# Patient Record
Sex: FEMALE | Race: WHITE | NOT HISPANIC OR LATINO | Employment: OTHER | URBAN - METROPOLITAN AREA
[De-identification: names, ages, dates, MRNs, and addresses within clinical notes are randomized per-mention and may not be internally consistent; named-entity substitution may affect disease eponyms.]

---

## 2021-09-23 ENCOUNTER — OFFICE VISIT (OUTPATIENT)
Dept: FAMILY MEDICINE CLINIC | Facility: CLINIC | Age: 61
End: 2021-09-23

## 2021-09-23 VITALS
HEIGHT: 67 IN | WEIGHT: 196.8 LBS | OXYGEN SATURATION: 100 % | SYSTOLIC BLOOD PRESSURE: 130 MMHG | BODY MASS INDEX: 30.89 KG/M2 | HEART RATE: 66 BPM | DIASTOLIC BLOOD PRESSURE: 78 MMHG | TEMPERATURE: 98.6 F

## 2021-09-23 DIAGNOSIS — Z86.39 HISTORY OF THYROID NODULE: ICD-10-CM

## 2021-09-23 DIAGNOSIS — J32.9 CHRONIC SINUSITIS, UNSPECIFIED LOCATION: ICD-10-CM

## 2021-09-23 DIAGNOSIS — N63.20 MASS OF BREAST, LEFT: Primary | ICD-10-CM

## 2021-09-23 DIAGNOSIS — R53.83 FATIGUE, UNSPECIFIED TYPE: ICD-10-CM

## 2021-09-23 DIAGNOSIS — E06.9 THYROIDITIS: ICD-10-CM

## 2021-09-23 DIAGNOSIS — N64.52 DISCHARGE FROM LEFT NIPPLE: ICD-10-CM

## 2021-09-23 PROBLEM — Z85.528 PERSONAL HISTORY OF KIDNEY CANCER: Status: ACTIVE | Noted: 2021-09-23

## 2021-09-23 PROCEDURE — 99204 OFFICE O/P NEW MOD 45 MIN: CPT | Performed by: FAMILY MEDICINE

## 2021-09-23 NOTE — PROGRESS NOTES
"Chief Complaint  Establish Care and lump on chest    Subjective          Daysi Lugo presents to [facility]  Patient is here today to establish care.  She comes in with a new complaint of a mass on the left side of her chest for the past 5 to 6 weeks.  It is tender to palpation.  She has noted some clear to milky to yellowish nipple discharge intermittently for the past 3 years on the left side only.  Patient states that other than the tender lump in the left breast she does not have any breast pain.  She has also not noticed any redness or bruising in the area of the mass.  The patient did have a breast reduction surgery years ago.  The patient has had one mammography in her lifetime and that was about 20 years ago.     Patient is also here to discuss her thyroid.  A few years ago she was told she had an enlarged thyroid and had nodules on her thyroid.  One of her nodules was biopsied.  It was benign.  She was then started on Synthroid but had an allergic reaction to it.  She failed to follow-up with that provider and currently does not take any medications.  She has noticed some enlargement in her neck.  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  Her mother and grandmother both had thyroid cancer.  The patient herself has had a right nephrectomy due to primary kidney cancer in the past.    Patient also wanted to address chronic sinus issues.  She states for over 20 years she has had sinus problems.  She has tried multiple over-the-counter medications and prescribed medications but from prior doctors which have had temporary relief but never long-term relief of her sinus pressure and pain.  She has never seen an ENT specialist.      Objective   Vital Signs:   /78   Pulse 66   Temp 98.6 °F (37 °C)   Ht 170.2 cm (67\")   " Wt 89.3 kg (196 lb 12.8 oz)   SpO2 100%   BMI 30.82 kg/m²     Physical Exam  Vitals and nursing note reviewed.   Constitutional:       Appearance: She is well-developed.   HENT:      Head: Normocephalic and atraumatic.   Eyes:      Conjunctiva/sclera: Conjunctivae normal.   Neck:      Thyroid: Thyromegaly present. No thyroid mass or thyroid tenderness.   Cardiovascular:      Rate and Rhythm: Normal rate and regular rhythm.      Heart sounds: Normal heart sounds.   Pulmonary:      Effort: Pulmonary effort is normal.      Breath sounds: Normal breath sounds.   Chest:      Chest wall: Mass present.      Breasts:         Left: Mass present.          Comments: 2 x 2 centimeter area of firmness affixed to underlying tissues in the inner upper quadrant of the left breast.  No other masses appreciated.  No axillary lymph nodes noted.  Abdominal:      General: Bowel sounds are normal.      Palpations: Abdomen is soft.   Musculoskeletal:         General: Normal range of motion.      Cervical back: Normal range of motion and neck supple.   Lymphadenopathy:      Upper Body:      Right upper body: No supraclavicular or axillary adenopathy.      Left upper body: No supraclavicular or axillary adenopathy.   Skin:     General: Skin is warm and dry.      Capillary Refill: Capillary refill takes less than 2 seconds.      Findings: No rash.   Neurological:      Mental Status: She is alert and oriented to person, place, and time.   Psychiatric:         Mood and Affect: Mood normal.         Behavior: Behavior normal.         Thought Content: Thought content normal.         Judgment: Judgment normal.        Result Review :                 Assessment and Plan    Diagnoses and all orders for this visit:    1. Mass of breast, left (Primary)  -     Cancel: Mammo Diagnostic Digital Tomosynthesis Left With CAD; Future  -     Mammo Diagnostic Digital Tomosynthesis Bilateral With CAD; Future  -     US Breast Left Complete; Future    2.  Discharge from left nipple  -     Cancel: Mammo Diagnostic Digital Tomosynthesis Left With CAD; Future  -     Mammo Diagnostic Digital Tomosynthesis Bilateral With CAD; Future  -     US Breast Left Complete; Future    3. Thyroiditis  -     TSH    4. History of thyroid nodule  -     TSH    5. Fatigue, unspecified type  -     TSH  -     CBC & Differential  -     Comprehensive Metabolic Panel    6. Chronic sinusitis, unspecified location  -     Ambulatory Referral to ENT (Otolaryngology)    Patient here to establish care for several new issues.  Mass of left breast/nipple discharge.  Will get diagnostic mammogram and ultrasound.  Follow-up based on results.  Thyroiditis.  Labs to evaluate thyroid.  May need to get a thyroid ultrasound.  Chronic sinusitis.  Referral to ENT.  Fatigue.  Will assess for anemia, metabolic issue or thyroid problems the labs today.      Follow Up   Return in about 2 months (around 11/23/2021) for Annual physical.  Patient was given instructions and counseling regarding her condition or for health maintenance advice. Please see specific information pulled into the AVS if appropriate.

## 2021-09-24 LAB
ALBUMIN SERPL-MCNC: 4.7 G/DL (ref 3.8–4.9)
ALBUMIN/GLOB SERPL: 2.1 {RATIO} (ref 1.2–2.2)
ALP SERPL-CCNC: 131 IU/L (ref 44–121)
ALT SERPL-CCNC: 17 IU/L (ref 0–32)
AST SERPL-CCNC: 13 IU/L (ref 0–40)
BASOPHILS # BLD AUTO: 0 X10E3/UL (ref 0–0.2)
BASOPHILS NFR BLD AUTO: 0 %
BILIRUB SERPL-MCNC: 0.6 MG/DL (ref 0–1.2)
BUN SERPL-MCNC: 15 MG/DL (ref 6–24)
BUN/CREAT SERPL: 19 (ref 9–23)
CALCIUM SERPL-MCNC: 10 MG/DL (ref 8.7–10.2)
CHLORIDE SERPL-SCNC: 102 MMOL/L (ref 96–106)
CO2 SERPL-SCNC: 25 MMOL/L (ref 20–29)
CREAT SERPL-MCNC: 0.8 MG/DL (ref 0.57–1)
EOSINOPHIL # BLD AUTO: 0.3 X10E3/UL (ref 0–0.4)
EOSINOPHIL NFR BLD AUTO: 4 %
ERYTHROCYTE [DISTWIDTH] IN BLOOD BY AUTOMATED COUNT: 13.8 % (ref 11.7–15.4)
GLOBULIN SER CALC-MCNC: 2.2 G/DL (ref 1.5–4.5)
GLUCOSE SERPL-MCNC: 131 MG/DL (ref 65–99)
HCT VFR BLD AUTO: 45.7 % (ref 34–46.6)
HGB BLD-MCNC: 14.7 G/DL (ref 11.1–15.9)
IMM GRANULOCYTES # BLD AUTO: 0 X10E3/UL (ref 0–0.1)
IMM GRANULOCYTES NFR BLD AUTO: 0 %
LYMPHOCYTES # BLD AUTO: 2.1 X10E3/UL (ref 0.7–3.1)
LYMPHOCYTES NFR BLD AUTO: 29 %
MCH RBC QN AUTO: 29.2 PG (ref 26.6–33)
MCHC RBC AUTO-ENTMCNC: 32.2 G/DL (ref 31.5–35.7)
MCV RBC AUTO: 91 FL (ref 79–97)
MONOCYTES # BLD AUTO: 0.4 X10E3/UL (ref 0.1–0.9)
MONOCYTES NFR BLD AUTO: 6 %
NEUTROPHILS # BLD AUTO: 4.4 X10E3/UL (ref 1.4–7)
NEUTROPHILS NFR BLD AUTO: 61 %
PLATELET # BLD AUTO: 271 X10E3/UL (ref 150–450)
POTASSIUM SERPL-SCNC: 4.4 MMOL/L (ref 3.5–5.2)
PROT SERPL-MCNC: 6.9 G/DL (ref 6–8.5)
RBC # BLD AUTO: 5.03 X10E6/UL (ref 3.77–5.28)
SODIUM SERPL-SCNC: 142 MMOL/L (ref 134–144)
TSH SERPL DL<=0.005 MIU/L-ACNC: 2.26 UIU/ML (ref 0.45–4.5)
WBC # BLD AUTO: 7.1 X10E3/UL (ref 3.4–10.8)

## 2021-09-28 LAB
HBA1C MFR BLD: 8 % (ref 4.8–5.6)
WRITTEN AUTHORIZATION: NORMAL

## 2021-10-29 ENCOUNTER — APPOINTMENT (OUTPATIENT)
Dept: MAMMOGRAPHY | Facility: HOSPITAL | Age: 61
End: 2021-10-29

## 2021-10-29 ENCOUNTER — APPOINTMENT (OUTPATIENT)
Dept: ULTRASOUND IMAGING | Facility: HOSPITAL | Age: 61
End: 2021-10-29

## 2021-11-09 ENCOUNTER — HOSPITAL ENCOUNTER (OUTPATIENT)
Dept: ULTRASOUND IMAGING | Facility: HOSPITAL | Age: 61
Discharge: HOME | End: 2021-11-09

## 2021-11-09 ENCOUNTER — HOSPITAL ENCOUNTER (OUTPATIENT)
Dept: MAMMOGRAPHY | Facility: HOSPITAL | Age: 61
Discharge: HOME | End: 2021-11-09

## 2021-11-09 DIAGNOSIS — N64.52 DISCHARGE FROM LEFT NIPPLE: ICD-10-CM

## 2021-11-09 DIAGNOSIS — N63.20 MASS OF BREAST, LEFT: ICD-10-CM

## 2021-11-09 PROCEDURE — 76642 ULTRASOUND BREAST LIMITED: CPT

## 2021-11-09 PROCEDURE — 77066 DX MAMMO INCL CAD BI: CPT

## 2021-11-09 PROCEDURE — G0279 TOMOSYNTHESIS, MAMMO: HCPCS

## 2021-11-10 DIAGNOSIS — R92.8 ABNORMAL MAMMOGRAM OF LEFT BREAST: ICD-10-CM

## 2021-11-10 DIAGNOSIS — N63.20 MASS OF BREAST, LEFT: Primary | ICD-10-CM

## 2021-12-02 ENCOUNTER — HOSPITAL ENCOUNTER (OUTPATIENT)
Dept: ULTRASOUND IMAGING | Facility: HOSPITAL | Age: 61
Discharge: HOME | End: 2021-12-02

## 2021-12-02 ENCOUNTER — HOSPITAL ENCOUNTER (OUTPATIENT)
Dept: MAMMOGRAPHY | Facility: HOSPITAL | Age: 61
Discharge: HOME | End: 2021-12-02

## 2021-12-02 VITALS
BODY MASS INDEX: 31.08 KG/M2 | HEIGHT: 67 IN | HEART RATE: 82 BPM | DIASTOLIC BLOOD PRESSURE: 96 MMHG | SYSTOLIC BLOOD PRESSURE: 163 MMHG | TEMPERATURE: 97.5 F | OXYGEN SATURATION: 100 % | RESPIRATION RATE: 16 BRPM | WEIGHT: 198 LBS

## 2021-12-02 DIAGNOSIS — R92.8 ABNORMAL MAMMOGRAM OF LEFT BREAST: ICD-10-CM

## 2021-12-02 DIAGNOSIS — N63.20 MASS OF BREAST, LEFT: ICD-10-CM

## 2021-12-02 PROCEDURE — 88360 TUMOR IMMUNOHISTOCHEM/MANUAL: CPT | Performed by: FAMILY MEDICINE

## 2021-12-02 PROCEDURE — 88342 IMHCHEM/IMCYTCHM 1ST ANTB: CPT | Performed by: FAMILY MEDICINE

## 2021-12-02 PROCEDURE — A4648 IMPLANTABLE TISSUE MARKER: HCPCS

## 2021-12-02 PROCEDURE — 88305 TISSUE EXAM BY PATHOLOGIST: CPT | Performed by: FAMILY MEDICINE

## 2021-12-02 PROCEDURE — 77065 DX MAMMO INCL CAD UNI: CPT

## 2021-12-02 RX ORDER — LIDOCAINE HYDROCHLORIDE AND EPINEPHRINE 10; 10 MG/ML; UG/ML
10 INJECTION, SOLUTION INFILTRATION; PERINEURAL ONCE
Status: DISCONTINUED | OUTPATIENT
Start: 2021-12-02 | End: 2021-12-03 | Stop reason: HOSPADM

## 2021-12-02 RX ORDER — LIDOCAINE HYDROCHLORIDE 10 MG/ML
10 INJECTION, SOLUTION INFILTRATION; PERINEURAL ONCE
Status: DISCONTINUED | OUTPATIENT
Start: 2021-12-02 | End: 2021-12-03 | Stop reason: HOSPADM

## 2021-12-02 NOTE — H&P
Name: Daysi Lugo ADMIT: 2021   : 1960  PCP: Zarina Tineo DO    MRN: [id number] LOS: 0 days   AGE/SEX: 61 y.o. female  ROOM: Room/bed info not found       Chief complaint L breast mass     Present Illness or Internal History:  Patient is a 61 y.o. female presents with L breast mass for US bx.     Past Surgical History:  Past Surgical History:   Procedure Laterality Date   • BACK SURGERY     • HYSTERECTOMY     • KNEE SURGERY Right    • KNEE SURGERY Left    • NEPHRECTOMY Right    • REDUCTION MAMMAPLASTY     • THYROID BIOPSY Bilateral        Past Medical History:  History reviewed. No pertinent past medical history.    Home Medications:  (Not in a hospital admission)      Allergies:  Antihistamines, loratadine-type and Penicillins    Family History:  Family History   Problem Relation Age of Onset   • Thyroid cancer Mother    • Diabetes type II Mother    • Hypertension Mother    • Prostate cancer Father    • Breast cancer Maternal Grandmother    • Colon cancer Maternal Grandmother    • Thyroid cancer Maternal Grandmother    • Alzheimer's disease Maternal Grandfather        Social History:  Social History     Tobacco Use   • Smoking status: Never Smoker   • Smokeless tobacco: Never Used   Vaping Use   • Vaping Use: Never used   Substance Use Topics   • Alcohol use: Yes     Comment: rarely   • Drug use: Never        Objective     Physical Exam:    No exam performed today,    Vital Signs  Temp:  [97.5 °F (36.4 °C)] 97.5 °F (36.4 °C)  Resp:  [19] 19  BP: (177)/(96) 177/96    Anticipated Surgical Procedure:  Left breast ultrasound guided core needle biopsy    The risks, benefits and alternatives of this procedure have been discussed with the patient or responsible party: Yes        Korin Blake MD  21  14:11 EST

## 2021-12-02 NOTE — NURSING NOTE
Biopsy site to left upper breast clear with Exofin dry and intact. No firmness or swelling noted at or around biopsy site. Denies pain. Ice pack with protective covering applied to biopsy site. Discharge instructions discussed with understanding voiced by patient. Copies provided to patient. No distress noted. To home via private vehicle.

## 2021-12-02 NOTE — NURSING NOTE
VSS. Pain 1/10in left breast. Medical/surgical history and medications reviewed. No distress noted. Procedural education completed with patient's questions addressed.

## 2021-12-06 ENCOUNTER — OFFICE VISIT (OUTPATIENT)
Dept: FAMILY MEDICINE CLINIC | Facility: CLINIC | Age: 61
End: 2021-12-06

## 2021-12-06 VITALS
OXYGEN SATURATION: 95 % | HEIGHT: 67 IN | SYSTOLIC BLOOD PRESSURE: 162 MMHG | DIASTOLIC BLOOD PRESSURE: 90 MMHG | BODY MASS INDEX: 31.08 KG/M2 | WEIGHT: 198 LBS | HEART RATE: 92 BPM | TEMPERATURE: 96.9 F

## 2021-12-06 DIAGNOSIS — R03.0 ELEVATED BLOOD PRESSURE READING IN OFFICE WITHOUT DIAGNOSIS OF HYPERTENSION: ICD-10-CM

## 2021-12-06 DIAGNOSIS — Z00.00 ENCOUNTER FOR WELLNESS EXAMINATION IN ADULT: Primary | ICD-10-CM

## 2021-12-06 DIAGNOSIS — Z23 NEEDS FLU SHOT: ICD-10-CM

## 2021-12-06 DIAGNOSIS — E11.9 TYPE 2 DIABETES MELLITUS WITHOUT COMPLICATION, WITHOUT LONG-TERM CURRENT USE OF INSULIN (HCC): ICD-10-CM

## 2021-12-06 DIAGNOSIS — C50.912 INFILTRATING DUCTAL CARCINOMA OF LEFT BREAST (HCC): ICD-10-CM

## 2021-12-06 DIAGNOSIS — Z12.11 SCREENING FOR MALIGNANT NEOPLASM OF COLON: ICD-10-CM

## 2021-12-06 LAB
LAB AP CASE REPORT: NORMAL
LAB AP DIAGNOSIS COMMENT: NORMAL
LAB AP INTRADEPARTMENTAL CONSULT: NORMAL
LAB AP SPECIAL STAINS: NORMAL
LAB AP SYNOPTIC CHECKLIST: NORMAL
PATH REPORT.FINAL DX SPEC: NORMAL
PATH REPORT.GROSS SPEC: NORMAL

## 2021-12-06 PROCEDURE — 99396 PREV VISIT EST AGE 40-64: CPT | Performed by: FAMILY MEDICINE

## 2021-12-06 PROCEDURE — 90471 IMMUNIZATION ADMIN: CPT | Performed by: FAMILY MEDICINE

## 2021-12-06 PROCEDURE — 99213 OFFICE O/P EST LOW 20 MIN: CPT | Performed by: FAMILY MEDICINE

## 2021-12-06 PROCEDURE — 90686 IIV4 VACC NO PRSV 0.5 ML IM: CPT | Performed by: FAMILY MEDICINE

## 2021-12-06 RX ORDER — LANCETS 28 GAUGE
1 EACH MISCELLANEOUS DAILY
Qty: 100 EACH | Refills: 4 | Status: SHIPPED | OUTPATIENT
Start: 2021-12-06

## 2021-12-06 RX ORDER — BLOOD-GLUCOSE METER
1 KIT MISCELLANEOUS DAILY
Qty: 1 EACH | Refills: 0 | Status: SHIPPED | OUTPATIENT
Start: 2021-12-06 | End: 2021-12-29 | Stop reason: SDUPTHER

## 2021-12-06 RX ORDER — METFORMIN HYDROCHLORIDE 500 MG/1
1000 TABLET, EXTENDED RELEASE ORAL
Qty: 60 TABLET | Refills: 1 | Status: SHIPPED | OUTPATIENT
Start: 2021-12-06 | End: 2021-12-16 | Stop reason: SDUPTHER

## 2021-12-06 NOTE — PROGRESS NOTES
Subjective   Daysi Lugo is a 61 y.o. female who presents for annual female wellness exam.  Chief Complaint   Patient presents with   • Annual Exam   • Follow-up     biopsy of breast     Patient is also here to follow-up on a left breast biopsy of a mass from September.  The patient had delayed biopsy because her  passed away 6 weeks ago.  She had her biopsy done on December 2 at [facility].    Patient is also here to discuss new onset of type 2 diabetes.  She was diagnosed by a A1c of 8.0 on September 23, 2021.  However the patient did not follow-up sooner because of the death of her  6 weeks ago.  Her sister and mother are both diabetic.    Menstrual History: 1992 hysterectomy, DUB  Pregnancy History: G0  Sexual History: Monogamous male partner for the past 30 years, recently  (colon CA)  Contraception: Hysterectomy  Hormone Replacement Therapy: NA  Diet: as healthy as possible  Exercise: no routine  Do you feel safe? Yes  Have you ever been abused? No    Mammogram: 10/2021, just had breast biopsy that revealed invasive ductal carcinoma of the left breast.  Pap Smear: Hysterectomy for DUB  Bone Density: NA  Colon Cancer Screening: about 10 years, normal.    Immunization History   Administered Date(s) Administered   • COVID-19 (PFIZER) 04/17/2021, 05/08/2021   • Flu Vaccine Split Quad 10/09/2019, 10/07/2020   • FluLaval/Fluarix/Fluzone >6 12/06/2021       The following portions of the patient's history were reviewed and updated as appropriate: allergies, current medications, past family history, past medical history, past social history, past surgical history and problem list.    History reviewed. No pertinent past medical history.    Past Surgical History:   Procedure Laterality Date   • BACK SURGERY  1990   • HYSTERECTOMY     • KNEE SURGERY Right 2001   • KNEE SURGERY Left 2003   • NEPHRECTOMY Right 1991   • REDUCTION MAMMAPLASTY     • THYROID BIOPSY Bilateral 2010       Family  History   Problem Relation Age of Onset   • Thyroid cancer Mother    • Diabetes type II Mother    • Hypertension Mother    • Prostate cancer Father    • Breast cancer Maternal Grandmother    • Colon cancer Maternal Grandmother    • Thyroid cancer Maternal Grandmother    • Alzheimer's disease Maternal Grandfather        Social History     Socioeconomic History   • Marital status:      Spouse name: Lucas   • Number of children: 0   • Highest education level: Bachelor's degree (e.g., BA, AB, BS)   Tobacco Use   • Smoking status: Never Smoker   • Smokeless tobacco: Never Used   Vaping Use   • Vaping Use: Never used   Substance and Sexual Activity   • Alcohol use: Not Currently     Alcohol/week: 0.0 standard drinks     Comment: rarely   • Drug use: Never       Review of Systems   Constitutional: Negative for activity change, appetite change, fatigue and unexpected weight change.   HENT: Negative for congestion, ear pain, nosebleeds, sore throat and tinnitus.    Eyes: Negative for pain, redness and visual disturbance.   Respiratory: Negative for cough, shortness of breath and wheezing.    Cardiovascular: Negative for chest pain, palpitations and leg swelling.   Gastrointestinal: Negative for abdominal pain, blood in stool and nausea.   Endocrine: Negative for polydipsia and polyuria.   Genitourinary: Negative for dysuria, frequency, menstrual problem and vaginal discharge.   Musculoskeletal: Negative for arthralgias, joint swelling and myalgias.   Skin: Negative for rash.   Allergic/Immunologic: Negative for environmental allergies, food allergies and immunocompromised state.   Neurological: Negative for dizziness, speech difficulty, weakness and headaches.   Hematological: Negative for adenopathy. Does not bruise/bleed easily.   Psychiatric/Behavioral: Negative for decreased concentration and dysphoric mood. The patient is not nervous/anxious.        Objective   Vitals:    12/06/21 1319   BP: 162/90   Pulse:     Temp:    SpO2:      Body mass index is 31.01 kg/m².  Physical Exam  Vitals and nursing note reviewed.   Constitutional:       Appearance: She is well-developed.   HENT:      Head: Normocephalic and atraumatic.      Right Ear: Tympanic membrane, ear canal and external ear normal.      Left Ear: Tympanic membrane, ear canal and external ear normal.   Eyes:      General: No scleral icterus.  Cardiovascular:      Rate and Rhythm: Normal rate and regular rhythm.      Heart sounds: Normal heart sounds.   Pulmonary:      Effort: Pulmonary effort is normal.      Breath sounds: Normal breath sounds.   Musculoskeletal:         General: Normal range of motion.      Cervical back: Normal range of motion and neck supple.      Right lower leg: No edema.      Left lower leg: No edema.   Skin:     General: Skin is warm.      Findings: No rash.   Neurological:      Mental Status: She is alert and oriented to person, place, and time.   Psychiatric:         Mood and Affect: Mood normal.         Behavior: Behavior normal.         Thought Content: Thought content normal.         Judgment: Judgment normal.           Assessment/Plan   Diagnoses and all orders for this visit:    1. Encounter for wellness examination in adult (Primary)    2. Infiltrating ductal carcinoma of left breast (HCC)  -     Ambulatory Referral to Breast Surgery  -     Ambulatory Referral to Hematology / Oncology    3. Type 2 diabetes mellitus without complication, without long-term current use of insulin (HCC)  -     glucose blood test strip; 1 each by Other route Daily.  Dispense: 100 each; Refill: 4  -     glucose monitor monitoring kit; 1 each Daily.  Dispense: 1 each; Refill: 0  -     Lancets (freestyle) lancets; 1 each by Other route Daily.  Dispense: 100 each; Refill: 4  -     metFORMIN ER (GLUCOPHAGE-XR) 500 MG 24 hr tablet; Take 2 tablets by mouth Daily With Breakfast.  Dispense: 60 tablet; Refill: 1  -     Ambulatory Referral to Diabetic Education  -      Comprehensive Metabolic Panel    4. Elevated blood pressure reading in office without diagnosis of hypertension  -     Comprehensive Metabolic Panel    5. Screening for malignant neoplasm of colon  -     Ambulatory Referral to General Surgery    6. Needs flu shot  -     FluLaval/Fluarix/Fluzone >6 Months ([id number])    Patient is here today for routine physical.  She is also here for 3 new problems:    1) new diagnosis of infiltrating ductal carcinoma of the left breast.  Referrals to a breast surgeon and oncologist were made.  2) new diagnosis of new onset type 2 diabetes. I will start her on Metformin 500 mg once daily and then after 1 week she may increase to twice daily.  I had a long discussion with the patient regarding the importance of diet and exercise in the management of type 2 diabetes.  Patient was also given prescription for glucometer, test strips and lancets.  She will meet with the diabetic educator per referral.  Follow-up in 2 weeks.  3) elevated blood pressure readings. The patient had a right nephrectomy for cancer of the kidney in the past. If labs are okay today I will consider starting lisinopril.  Follow-up in 2 weeks.    Discussed the importance of maintaining a healthy weight and getting regular exercise.  Educated patient on the benefits of healthy diet.  Advise follow-up annually for wellness exams.      There are no Patient Instructions on file for this visit.

## 2021-12-07 LAB
ALBUMIN SERPL-MCNC: 4.2 G/DL (ref 3.8–4.9)
ALBUMIN/GLOB SERPL: 1.9 {RATIO} (ref 1.2–2.2)
ALP SERPL-CCNC: 125 IU/L (ref 44–121)
ALT SERPL-CCNC: 17 IU/L (ref 0–32)
AST SERPL-CCNC: 11 IU/L (ref 0–40)
BILIRUB SERPL-MCNC: 0.3 MG/DL (ref 0–1.2)
BUN SERPL-MCNC: 17 MG/DL (ref 6–24)
BUN/CREAT SERPL: 21 (ref 9–23)
CALCIUM SERPL-MCNC: 9.6 MG/DL (ref 8.7–10.2)
CHLORIDE SERPL-SCNC: 101 MMOL/L (ref 96–106)
CO2 SERPL-SCNC: 25 MMOL/L (ref 20–29)
CREAT SERPL-MCNC: 0.82 MG/DL (ref 0.57–1)
GLOBULIN SER CALC-MCNC: 2.2 G/DL (ref 1.5–4.5)
GLUCOSE SERPL-MCNC: 196 MG/DL (ref 65–99)
POTASSIUM SERPL-SCNC: 4.3 MMOL/L (ref 3.5–5.2)
PROT SERPL-MCNC: 6.4 G/DL (ref 6–8.5)
SODIUM SERPL-SCNC: 142 MMOL/L (ref 134–144)

## 2021-12-10 ENCOUNTER — TELEPHONE (OUTPATIENT)
Dept: SURGERY | Facility: CLINIC | Age: 61
End: 2021-12-10

## 2021-12-10 NOTE — TELEPHONE ENCOUNTER
New patient appointment with Dr. Delong is scheduled on 12/29/2021 @ 11:00am.    Called and spoke to patient, patient expressed v/u of appointment time.    Sent patient a reminder letter in the mail.

## 2021-12-15 ENCOUNTER — TELEPHONE (OUTPATIENT)
Dept: SURGERY | Facility: CLINIC | Age: 61
End: 2021-12-15

## 2021-12-15 NOTE — TELEPHONE ENCOUNTER
Breast MRI is scheduled @ [facility] location on 12/23/2021 @ 12:00pm, patient arrival 11:30am.    Called and spoke to patient, patient expressed v/u of appointment times.    Sent patient a reminder letter in the mail.

## 2021-12-16 ENCOUNTER — OFFICE VISIT (OUTPATIENT)
Dept: FAMILY MEDICINE CLINIC | Facility: CLINIC | Age: 61
End: 2021-12-16

## 2021-12-16 VITALS
BODY MASS INDEX: 31.56 KG/M2 | DIASTOLIC BLOOD PRESSURE: 86 MMHG | SYSTOLIC BLOOD PRESSURE: 132 MMHG | HEART RATE: 82 BPM | HEIGHT: 67 IN | WEIGHT: 201.1 LBS | OXYGEN SATURATION: 96 % | TEMPERATURE: 98.2 F

## 2021-12-16 DIAGNOSIS — I10 PRIMARY HYPERTENSION: ICD-10-CM

## 2021-12-16 DIAGNOSIS — E11.9 TYPE 2 DIABETES MELLITUS WITHOUT COMPLICATION, WITHOUT LONG-TERM CURRENT USE OF INSULIN (HCC): Primary | ICD-10-CM

## 2021-12-16 LAB
BILIRUB BLD-MCNC: NEGATIVE MG/DL
CLARITY, POC: CLEAR
COLOR UR: YELLOW
EXPIRATION DATE: ABNORMAL
EXPIRATION DATE: NORMAL
GLUCOSE UR STRIP-MCNC: NEGATIVE MG/DL
HBA1C MFR BLD: 8.5 %
KETONES UR QL: NEGATIVE
LEUKOCYTE EST, POC: NEGATIVE
Lab: ABNORMAL
Lab: NORMAL
NITRITE UR-MCNC: NEGATIVE MG/ML
PH UR: 6 [PH] (ref 5–8)
POC CREATININE URINE: 10
POC MICROALBUMIN URINE: 10
PROT UR STRIP-MCNC: NEGATIVE MG/DL
RBC # UR STRIP: NEGATIVE /UL
SP GR UR: 1.01 (ref 1–1.03)
UROBILINOGEN UR QL: NORMAL

## 2021-12-16 PROCEDURE — 99214 OFFICE O/P EST MOD 30 MIN: CPT | Performed by: FAMILY MEDICINE

## 2021-12-16 PROCEDURE — 81003 URINALYSIS AUTO W/O SCOPE: CPT | Performed by: FAMILY MEDICINE

## 2021-12-16 PROCEDURE — 82044 UR ALBUMIN SEMIQUANTITATIVE: CPT | Performed by: FAMILY MEDICINE

## 2021-12-16 PROCEDURE — 83036 HEMOGLOBIN GLYCOSYLATED A1C: CPT | Performed by: FAMILY MEDICINE

## 2021-12-16 PROCEDURE — 93000 ELECTROCARDIOGRAM COMPLETE: CPT | Performed by: FAMILY MEDICINE

## 2021-12-16 RX ORDER — METFORMIN HYDROCHLORIDE 500 MG/1
1000 TABLET, EXTENDED RELEASE ORAL 2 TIMES DAILY WITH MEALS
Qty: 360 TABLET | Refills: 1 | Status: SHIPPED | OUTPATIENT
Start: 2021-12-16 | End: 2022-06-27 | Stop reason: SDUPTHER

## 2021-12-16 RX ORDER — LISINOPRIL 5 MG/1
5 TABLET ORAL DAILY
Qty: 30 TABLET | Refills: 1 | Status: SHIPPED | OUTPATIENT
Start: 2021-12-16 | End: 2022-01-12 | Stop reason: SDUPTHER

## 2021-12-16 NOTE — PROGRESS NOTES
"Chief Complaint  Diabetes    Subjective          Daysi Lugo presents to [facility]  Patient is here to follow up on new onset of type 2 diabetes.  She was diagnosed by a A1c of 8.0 on September 23, 2021.  She was started on metformin 500mg daily 2 weeks ago.  Currently she is taking Metformin 500 mg twice daily.  She is tolerating the medication well and denies any side effects.    Hypertension.  This is a new problem for the patient that has not been previously addressed.  Patient denies headaches, chest pain or shortness of breath.  Patient has had a right nephrectomy for kidney cancer in 1991.    Diabetes  She has type 2 diabetes mellitus. No MedicAlert identification noted. The initial diagnosis of diabetes was made 7 days ago. Symptoms are stable. Her weight is fluctuating minimally. She is following a generally healthy diet. When asked about meal planning, she reported none. She participates in exercise intermittently. She monitors blood glucose at home 1-2 x per day. There is no change in her home blood glucose trend. Her highest blood glucose is >200 mg/dl. Her overall blood glucose range is >200 mg/dl. Eye exam is current.       Objective   Vital Signs:   /86 (BP Location: Left arm, Patient Position: Sitting)   Pulse 82   Temp 98.2 °F (36.8 °C)   Ht 170.2 cm (67\")   Wt 91.2 kg (201 lb 1.6 oz)   SpO2 96%   BMI 31.50 kg/m²     Physical Exam  Vitals and nursing note reviewed.   Constitutional:       Appearance: She is well-developed.   HENT:      Head: Normocephalic and atraumatic.      Right Ear: External ear normal.      Left Ear: External ear normal.      Nose: Nose normal.   Eyes:      General: No scleral icterus.     Conjunctiva/sclera: Conjunctivae normal.   Cardiovascular:      Rate and Rhythm: Normal rate and regular rhythm.      Heart sounds: Normal heart sounds.   Pulmonary:      Effort: Pulmonary effort is normal.      Breath sounds: Normal breath " sounds.   Musculoskeletal:      Cervical back: Normal range of motion and neck supple.      Right lower leg: No edema.      Left lower leg: No edema.   Lymphadenopathy:      Cervical: No cervical adenopathy.   Skin:     General: Skin is warm and dry.      Findings: No rash.   Neurological:      Mental Status: She is alert and oriented to person, place, and time.   Psychiatric:         Mood and Affect: Mood normal.         Behavior: Behavior normal.         Thought Content: Thought content normal.         Judgment: Judgment normal.        Result Review :   The following data was reviewed by: Zarina Tineo DO on 12/16/2021:  Common labs    Common Labsle 9/23/21 9/23/21 9/23/21 12/6/21 12/16/21    1401 1401 1401     Glucose  131 (A)  196 (A)    BUN  15  17    Creatinine  0.80  0.82    eGFR Non  Am  81  79    eGFR African Am  93  91    Sodium  142  142    Potassium  4.4  4.3    Chloride  102  101    Calcium  10.0  9.6    Total Protein  6.9  6.4    Albumin  4.7  4.2    Total Bilirubin  0.6  0.3    Alkaline Phosphatase  131 (A)  125 (A)    AST (SGOT)  13  11    ALT (SGPT)  17  17    WBC 7.1       Hemoglobin 14.7       Hematocrit 45.7       Platelets 271       Hemoglobin A1C   8.0 (A)  8.5   (A) Abnormal value       Comments are available for some flowsheets but are not being displayed.           TSH    TSH 9/23/21   TSH 2.260           UA in house negative  Urine microalbumin negative         ECG 12 Lead    Date/Time: 12/16/2021 10:41 AM  Performed by: Zarina Tineo DO  Authorized by: aZrina Tineo DO   Previous ECG: no previous ECG available  Rhythm: sinus rhythm  Rate: normal  Conduction: conduction normal  ST Segments: ST segments normal  T Waves: T waves normal  QRS axis: normal    Clinical impression: normal ECG              Assessment and Plan    Diagnoses and all orders for this visit:    1. Type 2 diabetes mellitus without complication, without long-term current use of insulin (HCC) (Primary)  -      POC Glycosylated Hemoglobin (Hb A1C)  -     POC Microalbumin  -     metFORMIN ER (GLUCOPHAGE-XR) 500 MG 24 hr tablet; Take 2 tablets by mouth 2 (Two) Times a Day With Meals.  Dispense: 360 tablet; Refill: 1  -     Comprehensive Metabolic Panel    2. Primary hypertension  -     ECG 12 Lead  -     POC Urinalysis Dipstick, Automated  -     lisinopril (PRINIVIL,ZESTRIL) 5 MG tablet; Take 1 tablet by mouth Daily.  Dispense: 30 tablet; Refill: 1  -     Comprehensive Metabolic Panel    Patient is here today to follow-up on uncontrolled diabetes and for new problem of hypertension.  Increase the Metformin to the maximum dosage of 1000 mg twice daily.  Start lisinopril 5 mg daily for blood pressure.  I will monitor her creatinine closely as the patient only has 1 kidney.  Surveillance labs were obtained today and any medication changes will be made based on lab results and will be called to the patient later this week.      Follow Up   Return in about 18 days (around 1/3/2022) for Diabetes, HTN.  Patient was given instructions and counseling regarding her condition or for health maintenance advice. Please see specific information pulled into the AVS if appropriate.       Answers for HPI/ROS submitted by the patient on 12/13/2021  What is the primary reason for your visit?: Diabetes

## 2021-12-17 ENCOUNTER — PATIENT MESSAGE (OUTPATIENT)
Dept: FAMILY MEDICINE CLINIC | Facility: CLINIC | Age: 61
End: 2021-12-17

## 2021-12-17 DIAGNOSIS — E11.9 TYPE 2 DIABETES MELLITUS WITHOUT COMPLICATION, WITHOUT LONG-TERM CURRENT USE OF INSULIN (HCC): ICD-10-CM

## 2021-12-17 LAB
ALBUMIN SERPL-MCNC: 4.4 G/DL (ref 3.8–4.9)
ALBUMIN/GLOB SERPL: 2.1 {RATIO} (ref 1.2–2.2)
ALP SERPL-CCNC: 120 IU/L (ref 44–121)
ALT SERPL-CCNC: 16 IU/L (ref 0–32)
AST SERPL-CCNC: 12 IU/L (ref 0–40)
BILIRUB SERPL-MCNC: 0.5 MG/DL (ref 0–1.2)
BUN SERPL-MCNC: 20 MG/DL (ref 6–24)
BUN/CREAT SERPL: 24 (ref 9–23)
CALCIUM SERPL-MCNC: 9.8 MG/DL (ref 8.7–10.2)
CHLORIDE SERPL-SCNC: 100 MMOL/L (ref 96–106)
CO2 SERPL-SCNC: 24 MMOL/L (ref 20–29)
CREAT SERPL-MCNC: 0.84 MG/DL (ref 0.57–1)
GLOBULIN SER CALC-MCNC: 2.1 G/DL (ref 1.5–4.5)
GLUCOSE SERPL-MCNC: 130 MG/DL (ref 65–99)
POTASSIUM SERPL-SCNC: 4.7 MMOL/L (ref 3.5–5.2)
PROT SERPL-MCNC: 6.5 G/DL (ref 6–8.5)
SODIUM SERPL-SCNC: 140 MMOL/L (ref 134–144)

## 2021-12-17 NOTE — TELEPHONE ENCOUNTER
From: Daysi Lugo  To: Zarina Tineo DO  Sent: 12/17/2021 12:41 PM EST  Subject: OneTouch Verio Test Strips Rx quantity    Dr. Tineo,     RE: [id number]    I spoke to the pharmacy. The quantity of this prescription was 100 per fill; however, the insurance will only allow 25 per fill because the dosage listed was to test once daily.    CD    Daysi Lugo  [phone]

## 2021-12-23 ENCOUNTER — HOSPITAL ENCOUNTER (OUTPATIENT)
Dept: MRI IMAGING | Facility: HOSPITAL | Age: 61
Discharge: HOME | End: 2021-12-23
Admitting: SURGERY

## 2021-12-23 ENCOUNTER — TELEPHONE (OUTPATIENT)
Dept: FAMILY MEDICINE CLINIC | Facility: CLINIC | Age: 61
End: 2021-12-23

## 2021-12-23 DIAGNOSIS — C50.212 MALIGNANT NEOPLASM OF UPPER-INNER QUADRANT OF LEFT BREAST IN FEMALE, ESTROGEN RECEPTOR POSITIVE (HCC): ICD-10-CM

## 2021-12-23 DIAGNOSIS — Z17.0 MALIGNANT NEOPLASM OF UPPER-INNER QUADRANT OF LEFT BREAST IN FEMALE, ESTROGEN RECEPTOR POSITIVE (HCC): ICD-10-CM

## 2021-12-23 PROCEDURE — A9577 INJ MULTIHANCE: HCPCS | Performed by: SURGERY

## 2021-12-23 PROCEDURE — 82565 ASSAY OF CREATININE: CPT

## 2021-12-23 PROCEDURE — 0 GADOBENATE DIMEGLUMINE 529 MG/ML SOLUTION: Performed by: SURGERY

## 2021-12-23 PROCEDURE — 77049 MRI BREAST C-+ W/CAD BI: CPT

## 2021-12-23 RX ADMIN — GADOBENATE DIMEGLUMINE 20 ML: 529 INJECTION, SOLUTION INTRAVENOUS at 19:54

## 2021-12-26 LAB — CREAT BLDA-MCNC: 1.2 MG/DL (ref 0.6–1.3)

## 2021-12-29 ENCOUNTER — OFFICE VISIT (OUTPATIENT)
Dept: SURGERY | Facility: CLINIC | Age: 61
End: 2021-12-29

## 2021-12-29 ENCOUNTER — CONSULT (OUTPATIENT)
Dept: ONCOLOGY | Facility: CLINIC | Age: 61
End: 2021-12-29

## 2021-12-29 ENCOUNTER — PREP FOR SURGERY (OUTPATIENT)
Dept: OTHER | Facility: HOSPITAL | Age: 61
End: 2021-12-29

## 2021-12-29 ENCOUNTER — LAB (OUTPATIENT)
Dept: OTHER | Facility: HOSPITAL | Age: 61
End: 2021-12-29

## 2021-12-29 VITALS
WEIGHT: 207.6 LBS | TEMPERATURE: 97.3 F | HEART RATE: 68 BPM | DIASTOLIC BLOOD PRESSURE: 86 MMHG | OXYGEN SATURATION: 95 % | HEIGHT: 66 IN | SYSTOLIC BLOOD PRESSURE: 165 MMHG | RESPIRATION RATE: 16 BRPM | BODY MASS INDEX: 33.37 KG/M2

## 2021-12-29 VITALS
DIASTOLIC BLOOD PRESSURE: 98 MMHG | WEIGHT: 205 LBS | HEART RATE: 69 BPM | OXYGEN SATURATION: 97 % | HEIGHT: 67 IN | SYSTOLIC BLOOD PRESSURE: 162 MMHG | BODY MASS INDEX: 32.18 KG/M2

## 2021-12-29 DIAGNOSIS — Z17.0 MALIGNANT NEOPLASM OF UPPER-INNER QUADRANT OF LEFT BREAST IN FEMALE, ESTROGEN RECEPTOR POSITIVE (HCC): Primary | ICD-10-CM

## 2021-12-29 DIAGNOSIS — Z85.528 PERSONAL HISTORY OF KIDNEY CANCER: ICD-10-CM

## 2021-12-29 DIAGNOSIS — C50.212 MALIGNANT NEOPLASM OF UPPER-INNER QUADRANT OF LEFT BREAST IN FEMALE, ESTROGEN RECEPTOR POSITIVE (HCC): Primary | ICD-10-CM

## 2021-12-29 DIAGNOSIS — Z17.0 MALIGNANT NEOPLASM OF UPPER-INNER QUADRANT OF LEFT BREAST IN FEMALE, ESTROGEN RECEPTOR POSITIVE: ICD-10-CM

## 2021-12-29 DIAGNOSIS — C50.212 MALIGNANT NEOPLASM OF UPPER-INNER QUADRANT OF LEFT BREAST IN FEMALE, ESTROGEN RECEPTOR POSITIVE: ICD-10-CM

## 2021-12-29 DIAGNOSIS — Z17.0 MALIGNANT NEOPLASM OF UPPER-INNER QUADRANT OF LEFT BREAST IN FEMALE, ESTROGEN RECEPTOR POSITIVE: Primary | ICD-10-CM

## 2021-12-29 DIAGNOSIS — C50.212 MALIGNANT NEOPLASM OF UPPER-INNER QUADRANT OF LEFT BREAST IN FEMALE, ESTROGEN RECEPTOR POSITIVE: Primary | ICD-10-CM

## 2021-12-29 DIAGNOSIS — Z80.3 FAMILY HISTORY OF BREAST CANCER: ICD-10-CM

## 2021-12-29 DIAGNOSIS — C50.912 INFILTRATING DUCTAL CARCINOMA OF LEFT BREAST: Primary | ICD-10-CM

## 2021-12-29 LAB
BASOPHILS # BLD AUTO: 0.02 10*3/MM3 (ref 0–0.2)
BASOPHILS NFR BLD AUTO: 0.3 % (ref 0–1.5)
DEPRECATED RDW RBC AUTO: 43.1 FL (ref 37–54)
EOSINOPHIL # BLD AUTO: 0.28 10*3/MM3 (ref 0–0.4)
EOSINOPHIL NFR BLD AUTO: 4.1 % (ref 0.3–6.2)
ERYTHROCYTE [DISTWIDTH] IN BLOOD BY AUTOMATED COUNT: 13.4 % (ref 12.3–15.4)
HCT VFR BLD AUTO: 41.3 % (ref 34–46.6)
HGB BLD-MCNC: 14.2 G/DL (ref 12–15.9)
IMM GRANULOCYTES # BLD AUTO: 0.01 10*3/MM3 (ref 0–0.05)
IMM GRANULOCYTES NFR BLD AUTO: 0.1 % (ref 0–0.5)
LYMPHOCYTES # BLD AUTO: 1.8 10*3/MM3 (ref 0.7–3.1)
LYMPHOCYTES NFR BLD AUTO: 26.2 % (ref 19.6–45.3)
MCH RBC QN AUTO: 30.2 PG (ref 26.6–33)
MCHC RBC AUTO-ENTMCNC: 34.4 G/DL (ref 31.5–35.7)
MCV RBC AUTO: 87.9 FL (ref 79–97)
MONOCYTES # BLD AUTO: 0.36 10*3/MM3 (ref 0.1–0.9)
MONOCYTES NFR BLD AUTO: 5.2 % (ref 5–12)
NEUTROPHILS NFR BLD AUTO: 4.39 10*3/MM3 (ref 1.7–7)
NEUTROPHILS NFR BLD AUTO: 64.1 % (ref 42.7–76)
NRBC BLD AUTO-RTO: 0 /100 WBC (ref 0–0.2)
PLATELET # BLD AUTO: 206 10*3/MM3 (ref 140–450)
PMV BLD AUTO: 11.1 FL (ref 6–12)
RBC # BLD AUTO: 4.7 10*6/MM3 (ref 3.77–5.28)
WBC NRBC COR # BLD: 6.86 10*3/MM3 (ref 3.4–10.8)

## 2021-12-29 PROCEDURE — 99417 PROLNG OP E/M EACH 15 MIN: CPT | Performed by: SURGERY

## 2021-12-29 PROCEDURE — 36415 COLL VENOUS BLD VENIPUNCTURE: CPT

## 2021-12-29 PROCEDURE — 99205 OFFICE O/P NEW HI 60 MIN: CPT | Performed by: INTERNAL MEDICINE

## 2021-12-29 PROCEDURE — 85025 COMPLETE CBC W/AUTO DIFF WBC: CPT | Performed by: INTERNAL MEDICINE

## 2021-12-29 PROCEDURE — 99205 OFFICE O/P NEW HI 60 MIN: CPT | Performed by: SURGERY

## 2021-12-29 RX ORDER — DIAZEPAM 5 MG/1
10 TABLET ORAL ONCE
Status: CANCELLED | OUTPATIENT
Start: 2022-01-17 | End: 2021-12-29

## 2021-12-29 RX ORDER — BLOOD-GLUCOSE METER
KIT MISCELLANEOUS SEE ADMIN INSTRUCTIONS
COMMUNITY
Start: 2021-12-06

## 2021-12-29 RX ORDER — HEPARIN SODIUM 5000 [USP'U]/ML
5000 INJECTION, SOLUTION INTRAVENOUS; SUBCUTANEOUS
Status: CANCELLED | OUTPATIENT
Start: 2022-01-17 | End: 2022-01-18

## 2021-12-29 NOTE — PROGRESS NOTES
Subjective     REASON FOR CONSULTATION: Newly diagnosed breast:  Provide an opinion on any further workup or treatment                             REQUESTING PHYSICIAN: Dr. Zarina Tineo    RECORDS OBTAINED:  Records of the patients history including those obtained from the referring provider were reviewed and summarized in detail.    HISTORY OF PRESENT ILLNESS:  The patient is a 61 y.o. year old female who is here for an opinion about the above issue.    History of Present Illness patient is a 61-year-old female who is a  for [employer]. She recently back in September October 2021 noticed a mass in the left breast upper inner quadrant. She was playing with a cat and noticed some pain in the breast at which time she felt the breast and felt this mass. She has had history of nipple discharge on and off for years in the left breast but that has been very minimal. There is no bloody discharge. She has not had mammogram for years, at least 25 years according to her. So when she felt the mass she went to her primary care physician Dr. Zarina Tineo who ordered a diagnostic mammogram and found in abnormality in the left breast at 10 o'clock position which was 2.6 x 2.5 x 2.3 cm. Subsequently she underwent a biopsy which was consistent with invasive ductal carcinoma ER/OK positive HER-2/juan negative.    She does have a family history of malignancy with her grandmother having: And breast cancer on her maternal side. Her maternal great-grandmother had breast cancer. Her mother had thyroid cancer. Patient has had renal cell cancer at age 31 and had to undergo left nephrectomy. She also had a thyroid nodule for which she was seen at [place] by physician. He had placed her on Synthroid and she had a very severe allergic reaction to Synthroid with swelling and he discontinued that. Patient was to be followed in a years time and has not gone.. But she has not had any issues with that    More  recently she lost her  3 weeks ago as he was dealing with colon cancer. He was 70 years of age. He did not want any treatment. She had to take care of of him for the last 16 months and if no her symptoms. 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    Details are as follows    November 10, 2021: Bilateral diagnostic mammogram and ultrasound  FINDINGS: Bilateral digital CC and MLO mammographic and Tomosynthesis  images were obtained. No prior examination is available for comparison.  Scattered fibroglandular densities are seen throughout both breasts. In  the posterior one-third upper inner quadrant of the left breast at the  site of palpable concern corresponding to the overlying triangular skin  marker, there is an irregular mass that measures on the order of 2.6 x  2.5 x 2.3 cm. Internal coarse calcifications and areas of fat necrosis  are noted. I see no other dominant masses in either breast. No areas of  architectural distortion are appreciated. There is no evidence for skin  thickening, nipple retraction or axillary adenopathy.     ULTRASOUND: Targeted sonographic evaluation of the left breast was  performed through the area of palpable concern which corresponds to the  10-o'clock position on the order of 5 cm from the nipple. At this  location, there is an irregular hypoechoic mass with internal  calcifications. The mass measures 2.4 x 1.9 x 1.4 cm. No detectable  internal vascularity is appreciated. Posterior acoustic shadowing is  noted.     IMPRESSION:  1. There is an irregular mass in the left breast at the 10-o'clock  position corresponding to the site of palpable concern. Sonographically,  the mass measures on the order of 2.4 cm in greatest dimension.  Correlation with an ultrasound-guided left breast biopsy is recommended.  2. There are no findings suspicious for malignancy in the right breast.     November 9, 2021:  Ultrasound-guided left breast biopsy showed    Final Diagnosis   1. Left Breast, 10 O'clock, 5 cm from Nipple, Ultrasound-Guided Biopsies for a Mass: INVASIVE MAMMARY                CARCINOMA, NO SPECIAL TYPE (INVASIVE DUCTAL CARCINOMA).               A. Histologic grade: Eldon histologic score II (tubules = 3, nuclei = 2, mitosis = 1).               B. Largest contiguous invasive focus measures 10 mm in a core.               C. No definitive in situ component identified.                       D. No lymphovascular nor perineural invasion identified.     Estrogen receptor: %  Progesterone receptor: %  HER-2/juan: 1+, negative  Ki-67: 8%    December 28, 2021: MRI of the breast    FINDINGS:Scattered fibroglandular tissue is seen throughout both  breasts. Minimal background parenchymal enhancement of both breasts is  noted.     Within the posterior one-third upper inner quadrant of the left breast  centered at the 10 o'clock position on the order of 8 cm from nipple  there is an irregular enhancing mass that measures on the order of 2.5  cm in anterior to posterior dimension, 2.8 cm in the mediolateral  dimension and 2.1 cm in the superior-inferior dimension. A signal void  is seen within the mass that corresponds to a bowtie-shaped metallic  clip. This represents the biopsy-proven site of malignancy.     No other areas of abnormal enhancement or morphology are seen within the  left breast. I see no evidence for abnormal left breast skin, nipple or  chest wall enhancement.  There is no evidence for left axillary or  internal mammary chain adenopathy.     There are no areas of abnormal enhancement or morphology in the right  breast. I see no evidence for abnormal right breast skin, nipple or  chest wall enhancement and there is no evidence for right axillary or  internal mammary chain adenopathy.     IMPRESSION:  1. Biopsy-proven malignancy in the left breast at the 10 o'clock  position in the posterior  one-third that measures on the order of 2.8 cm  in greatest dimension with a centrally located bowtie shaped metallic  clip. No other suspicious findings are seen within the left breast and  there is no evidence for left axillary or internal mammary chain  adenopathy.  2. There are no findings suspicious for malignancy in the right breast.     BI-RADS category 6: Known malignancy.     This report was finalized on 12/28/2021 7:24 AM by Dr. Sixto Ramos M.D.     Patient has been scheduled for further recommendations. She has appointment with Dr. Beth Delong at 11 AM today.    Past Medical History:   Diagnosis Date   • Breast cancer (HCC) 2021    Invasive ductal carcinoma of left breast    • History of kidney cancer     S/P right nephrectomy in 1991   • Hypertension    • Type 2 diabetes mellitus (HCC)         Past Surgical History:   Procedure Laterality Date   • BACK SURGERY  1990   • BREAST BIOPSY Left 09/2021    mass    • HYSTERECTOMY  1992   • KNEE SURGERY Right 2001   • KNEE SURGERY Left 2003   • NEPHRECTOMY Right 1991   • REDUCTION MAMMAPLASTY     • THYROID BIOPSY Bilateral 2010        Current Outpatient Medications on File Prior to Visit   Medication Sig Dispense Refill   • Blood Glucose Monitoring Suppl (OneTouch Verio Reflect) w/Device kit See Admin Instructions.     • glucose blood test strip 1 each by Other route Daily. 25 each 2   • Lancets (freestyle) lancets 1 each by Other route Daily. 100 each 4   • lisinopril (PRINIVIL,ZESTRIL) 5 MG tablet Take 1 tablet by mouth Daily. 30 tablet 1   • metFORMIN ER (GLUCOPHAGE-XR) 500 MG 24 hr tablet Take 2 tablets by mouth 2 (Two) Times a Day With Meals. 360 tablet 1   • glucose monitor monitoring kit 1 each Daily. 1 each 0     No current facility-administered medications on file prior to visit.        ALLERGIES:    Allergies   Allergen Reactions   • Hydrocodone Anaphylaxis     PER PATIENT   • Penicillins Itching        Social History     Socioeconomic History    • Marital status:      Spouse name: Lucas   • Number of children: 0   • Highest education level: Bachelor's degree (e.g., BA, AB, BS)   Tobacco Use   • Smoking status: Never Smoker   • Smokeless tobacco: Never Used   Vaping Use   • Vaping Use: Never used   Substance and Sexual Activity   • Alcohol use: Not Currently     Alcohol/week: 0.0 standard drinks     Comment: rarely   • Drug use: Never        Family History   Problem Relation Age of Onset   • Thyroid cancer Mother    • Diabetes type II Mother    • Hypertension Mother    • Prostate cancer Father    • Breast cancer Maternal Grandmother    • Colon cancer Maternal Grandmother    • Thyroid cancer Maternal Grandmother    • Alzheimer's disease Maternal Grandfather         Review of Systems   Constitutional: Negative for appetite change, chills, diaphoresis, fatigue, fever and unexpected weight change.   HENT: Negative for hearing loss, sore throat and trouble swallowing.    Respiratory: Negative for cough, chest tightness, shortness of breath and wheezing.    Cardiovascular: Negative for chest pain, palpitations and leg swelling.   Gastrointestinal: Negative for abdominal distention, abdominal pain, constipation, diarrhea, nausea and vomiting.   Genitourinary: Negative for dysuria, frequency, hematuria and urgency.   Musculoskeletal: Negative for joint swelling.        No muscle weakness.   Skin: Negative for rash and wound.   Neurological: Negative for seizures, syncope, speech difficulty, weakness, numbness and headaches.   Hematological: Negative for adenopathy. Does not bruise/bleed easily.   Psychiatric/Behavioral: Negative for behavioral problems, confusion and suicidal ideas.   All other systems reviewed and are negative.  Patient has pelvic tenderness in the left upper inner quadrant of the breast and intermittent left nipple discharge which is nonbloody and very minimal    Family history:  Mother had thyroid cancer. Grandmother had colon cancer and  "breast cancer on the maternal side and great grandmother had breast cancer on the maternal side. Patient has had thyroid nodule which was biopsied 10 years ago and benign. She had renal cell cancer s/p left nephrectomy.    OB/GYN history  Age of menstruation: 13 years  Age of menopause: 50+ years   0 para 0, no miscarriages  Patient was exposed to birth control pills only the posterior of her marriage and took it just for 1 year  Patient did not take any postmenopausal hormone replacement therapy  Patient had hysterectomy 30 years ago.    Social history: She lost her  just recently and is willing upset about that. She works as a  in [employer]. She smoked just a few cigarettes for short very short time when she was in college. She does not drink alcohol. She has no kids.    Past medical history:  Hypertension, followed by her primary care  Diabetes mellitus for which she is on Metformin  Thyroid nodule which on biopsy was benign in the past    Objective     Vitals:    21 0846   BP: 165/86   Pulse: 68   Resp: 16   Temp: 97.3 °F (36.3 °C)   TempSrc: Temporal   SpO2: 95%   Weight: 94.2 kg (207 lb 9.6 oz)   Height: 167.7 cm (66.02\")  Comment: New Ht No Shoes   PainSc: 0-No pain     Current Status 2021   ECOG score 0       Physical Exam        CONSTITUTIONAL:  Vital signs reviewed.  No distress, looks comfortable.  EYES:  Conjunctivae and lids unremarkable.  PERRLA  RESPIRATORY:  Normal respiratory effort.  Lungs clear to auscultation bilaterally.  BREAST: Right breast: No skin changes, no evidence of breast mass, no nipple discharge, no evidence of any right axillary adenopathy or right supraclavicular adenopathy  Left breast: No evidence of any skin changes, no evidence of any left breast mass and no evidence of left nipple discharge as well as no left axillary adenopathy or left supraclavicular adenopathy.  CARDIOVASCULAR:  Normal S1, S2.  No murmurs rubs or " gallops.  No significant lower extremity edema.  GASTROINTESTINAL: Abdomen appears unremarkable.  Nontender.  No hepatomegaly.  No splenomegaly.  LYMPHATIC:  No cervical, supraclavicular, axillary lymphadenopathy.  SKIN:  Warm.  No rashes.  PSYCHIATRIC:  Normal judgment and insight.  Normal mood and affect.    RECENT LABS:  Hematology WBC   Date Value Ref Range Status   12/29/2021 6.86 3.40 - 10.80 10*3/mm3 Final   09/23/2021 7.1 3.4 - 10.8 x10E3/uL Final     RBC   Date Value Ref Range Status   12/29/2021 4.70 3.77 - 5.28 10*6/mm3 Final   09/23/2021 5.03 3.77 - 5.28 x10E6/uL Final     Hemoglobin   Date Value Ref Range Status   12/29/2021 14.2 12.0 - 15.9 g/dL Final     Hematocrit   Date Value Ref Range Status   12/29/2021 41.3 34.0 - 46.6 % Final     Platelets   Date Value Ref Range Status   12/29/2021 206 140 - 450 10*3/mm3 Final          Assessment/Plan     1.  T2 N0 M0, anatomic stage IIa , prognostic stage Ib invasive ductal carcinoma of the left breast at 10 o'clock position, grade 2, ER 91 x 100%, NE 91/100%, HER-2/juan 1+, Ki-67 8%.  No evidence of lymphovascular space invasion or perineural invasion  · Patient has not had screening mammogram for 25 years  · She felt a mass in September 2021 when she was holding the cat and noticed pain in the left breast upper inner quadrant and then she felt the mass, she was seen by her primary care physician  · Diagnostic mammogram, November 10, 2021: Bilateral showed a 2.5 x 2.6 x 2.3 cm mass in the posterior one third upper inner quadrant of the left breast.  Internal coarse calcification and areas of fat necrosis were noted.  No other dominant no masses were seen.  There was no skin thickening, nipple retraction or axillary adenopathy  · Ultrasound showed a 2.4 x 1.9 x 1.4 cm mass in the 10 o'clock position 5 cm from the nipple  · December 2, 2021 ultrasound-guided left breast biopsy showed invasive ductal carcinoma, grade 2 with a Nebo score of 6, %, NE  100%, HER-2/juan 1+, Ki-67 8%  · December 23, 2021: Patient underwent MRI of the breast which showed a 2.5 x 2.8 x 2.1 cm irregular enhancing mass at 10 o'clock position, 8 cm from the nipple.  There was no evidence of any left axillary or internal mammary chain lymphadenopathy.  Right breast was negative  · I had a lengthy discussion with the patient about the nature of the breast cancer that was present.  We told her that was the most common type of breast cancer and given that it was ER/AR positive HER-2/juan negative without any lymphovascular or perineural invasion and Ki-67 of 8% it does not appear to be a highly aggressive disease.  He states that the lump did not increase in size since she first noticed it.  · We then discussed about options of therapy of surgery upfront, followed by evaluation for Oncotype DX testing to see if she will be eligible for any chemotherapy and also will be eligible for endocrine therapy.  · Patient is to see Dr. Beth Delong  today and we will await her input, I did discuss that likely the best option is for consideration of surgery upfront and will await to hear from Dr. Delong  · However patient has family history of breast cancer in her maternal grandmother and great-grandmother and colon cancer in her maternal grandmother, thyroid cancer in her mother and personal history of renal cell cancer.  · We then discussed about obtaining INVITAE genetic test today and has been ordered in our office for the 9 breast cancer gene with reflux to be 47 gene panel.  · I told her surgical options will be discussed by Dr. Delong.    2.  Family history of malignancy:  · However patient has family history of breast cancer in her maternal grandmother and great-grandmother and colon cancer in her maternal grandmother, thyroid cancer in her mother and personal history of renal cell cancer.  · We then discussed about obtaining INVITAE genetic test today and has been ordered in our office for  the 9 breast cancer gene with reflux to be 47 gene panel.    3.  Hypertension currently on lisinopril followed by primary care physician.  Today mildly elevated blood pressure    4.  History of diabetes mellitus currently on Metformin    5.  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  History of renal cell cancer s/p left nephrectomy without evidence of recurrence and at age 31    Plan  · I have reviewed the diagnostic mammogram, ultrasound, MRI and discussed in length with patient  · I have reviewed the pathology as well and we explained that she has a T2N0 ER/NV positive HER-2 negative breast cancer  · Given family history of malignancies and personal history of renal cell cancer and family history of breast cancer in both grandmother and great-grandmother and colon cancer in grandmother, we obtained INVITAE genetic test today  · Patient has follow-up appointment with Dr. Delong today discussed the surgical options  · Follow-up with me in 5 weeks at which time we can make a final recommendation  · I also discussed the options of Oncotype DX testing on the final pathology and making a recommendation based on Oncotype DX testing regarding chemotherapy  · She certainly is eligible for endocrine therapy as well and briefly discussed endocrine therapy  · Final recommendation will be made after surgery.  · Follow-up with me in 5 weeks with labs  · She will need referral to [facility] given the degree of anxiety but at present we will hold off until      Thank you for allowing me to participate in the care of this patient .  We will keep you informed about her progress      I spent 75 total minutes, face-to-face, caring for Daysi today.  Greater than 50% of this time involved counseling and/or coordination of care as documented within this note.  MD Dr. Zarina Hernandez  Katerina Delong

## 2021-12-29 NOTE — PROGRESS NOTES
[facility]     Referring Provider: Zarina Tineo DO     Chief complaint: Newly diagnosed breast cancer     HPI: Ms. Daysi Lugo is a 60 yo woman, seen at the request of Dr. Zarina Tineo, for a new diagnosis of left breast cancer. The patient first noticed a lump in her upper inner left breast in October. It has not changed in size since she first noticed it and she denies any associated pain. Her work-up is detailed in the oncologic history below. Prior to this year, it had been a least 25 years since she had a mammogram. She has a past history of a bilateral breast reduction in 2008.    She was recently diagnosed with diabetes and her PCP is trying to get her blood sugar under control. Her most recent hemoglobin A1c on 12/16/21 was 8.5. She also has a personal history of kidney cancer, sp right nephrectomy. Her maternal grandmother had breast cancer and colon cancer. She denies any family history of ovarian cancer. She saw Dr. King earlier today prior to this appointment and Dr. King sent genetic testing.       Oncology/Hematology History   Malignant neoplasm of upper-inner quadrant of left breast in female, estrogen receptor positive (HCC)   11/8/2021 Initial Diagnosis    Malignant neoplasm of upper-inner quadrant of left breast in female, estrogen receptor positive (HCC)     11/9/2021 Imaging    Bilateral Diagnostic MMG with [facility] & Left Breast US ( [facility]):  MMG:  Scattered fibroglandular densities. In the posterior one-third upper inner quadrant of the left breast at the site of palpable concern corresponding to the overlying triangular skin marker, there is an irregular mass that measures on the order of 2.6 x 2.5 x 2.3 cm. Internal coarse calcifications and area of fat necrosis are noted. I see no other dominant masses in either breast. No areas of architectural distortion are appreciated. There is no evidence for skin thickening, nipple retraction or axillary adenopathy.  US:  Targeted  sonographic evaluation of the left breast was performed through the area of palpable concern which corresponds to the 10 o'clock position on the order of 5 cm from the nipple. At this location, there is an irregular hypoechoic mass with internal calcifications. The mass measures 2.4 x 1.9 x 1.4 cm. No detectable internal vascularity is appreciated. Posterior acoustic shadowing is noted.  BI-RADS 4: Suspicious.     12/2/2021 Biopsy    Left Breast, US-Guided Biopsy ([facility]):    1. Left Breast, 10 O'clock, 5 cm from Nipple, Ultrasound-Guided Biopsies for a Mass: INVASIVE MAMMARY                CARCINOMA, NO SPECIAL TYPE (INVASIVE DUCTAL CARCINOMA).               A. Histologic grade: Newport histologic score II (tubules = 3, nuclei = 2, mitosis = 1).               B. Largest contiguous invasive focus measures 10 mm in a core.               C. No definitive in situ component identified.                       D. No lymphovascular nor perineural invasion identified.    ER+ (%, strong)  NC+ (%, strong)  HER2 negative (IHC 1+)  Ki-67 8%     12/23/2021 Imaging    Bilateral Breast MRI ([facility]):  Within the posterior one-third upper inner quadrant of the left breast centered at the 10 o'clock position on the order of 8 cm from nipple there is an irregular enhancing mass that measures on the order of 2.5 cm in anterior to posterior dimension, 2.8 cm in the mediolateral dimension and 2.1 cm in the superior-inferior dimension. A signal void is seen within the mass that corresponds to a bowtie-shaped metallic clip. This represents the biopsy-proven site of malignancy.   No other areas of abnormal enhancement or morphology are seen within the left breast. I see no evidence for abnormal left breast skin, nipple or chest wall enhancement.  There is no evidence for left axillary or internal mammary chain adenopathy.    There are no areas of abnormal enhancement or morphology in the right breast. I see no evidence for  abnormal right breast skin, nipple or chest wall enhancement and there is no evidence for right axillary or internal mammary chain adenopathy.  BI-RADS 6: Known malignancy.         Review of Systems   Constitutional: Negative for appetite change, chills, diaphoresis, fatigue, fever and unexpected weight change.   HENT:   Negative for hearing loss, lump/mass, mouth sores, nosebleeds, sore throat, tinnitus, trouble swallowing and voice change.    Eyes: Negative for eye problems and icterus.   Respiratory: Negative for chest tightness, cough, hemoptysis, shortness of breath and wheezing.    Cardiovascular: Negative for chest pain, leg swelling and palpitations.   Gastrointestinal: Negative for abdominal distention, abdominal pain, blood in stool, constipation, diarrhea, nausea, rectal pain and vomiting.   Endocrine: Negative for hot flashes.   Genitourinary: Negative for bladder incontinence, difficulty urinating, dyspareunia, dysuria, frequency, hematuria, menstrual problem, nocturia, pelvic pain, vaginal bleeding and vaginal discharge.    Musculoskeletal: Negative for arthralgias, back pain, flank pain, gait problem, myalgias, neck pain and neck stiffness.   Skin: Negative for itching, rash and wound.   Neurological: Negative for dizziness, extremity weakness, gait problem, headaches, light-headedness, numbness, seizures and speech difficulty.   Hematological: Negative for adenopathy. Does not bruise/bleed easily.   Psychiatric/Behavioral: Negative for confusion, decreased concentration, depression, sleep disturbance and suicidal ideas. The patient is not nervous/anxious.    I personally reviewed and updated the ROS.      Medications:    Current Outpatient Medications:   •  Blood Glucose Monitoring Suppl (OneTouch Verio Reflect) w/Device kit, See Admin Instructions., Disp: , Rfl:   •  glucose blood test strip, 1 each by Other route Daily., Disp: 25 each, Rfl: 2  •  Lancets (freestyle) lancets, 1 each by Other route  Daily., Disp: 100 each, Rfl: 4  •  lisinopril (PRINIVIL,ZESTRIL) 5 MG tablet, Take 1 tablet by mouth Daily., Disp: 30 tablet, Rfl: 1  •  metFORMIN ER (GLUCOPHAGE-XR) 500 MG 24 hr tablet, Take 2 tablets by mouth 2 (Two) Times a Day With Meals., Disp: 360 tablet, Rfl: 1      Allergies   Allergen Reactions   • Hydrocodone Anaphylaxis     PER PATIENT   • Penicillins Itching       Past Medical History:   Diagnosis Date   • Breast cancer (HCC)     Invasive ductal carcinoma of left breast    • History of kidney cancer     S/P right nephrectomy in    • Hypertension    • Type 2 diabetes mellitus (HCC)        Past Surgical History:   Procedure Laterality Date   • BACK SURGERY     • HYSTERECTOMY     • KNEE SURGERY Right    • KNEE SURGERY Left    • NEPHRECTOMY Right    • REDUCTION MAMMAPLASTY     • THYROID BIOPSY Bilateral        Family History   Problem Relation Age of Onset   • Diabetes type II Mother    • Hypertension Mother    • Breast cancer Maternal Grandmother    • Colon cancer Maternal Grandmother    • Alzheimer's disease Maternal Grandfather        Social History     Socioeconomic History   • Marital status:      Spouse name: Lucas   • Number of children: 0   • Highest education level: Bachelor's degree (e.g., BA, AB, BS)   Tobacco Use   • Smoking status: Former Smoker     Packs/day: 0.00     Years: 0.00     Pack years: 0.00     Types: Cigarettes     Quit date:      Years since quittin.0   • Smokeless tobacco: Never Used   Vaping Use   • Vaping Use: Never used   Substance and Sexual Activity   • Alcohol use: Not Currently     Alcohol/week: 0.0 standard drinks     Comment: rarely   • Drug use: Never   • Sexual activity: Defer     Patient drinks 2-3 servings of caffeine per day.       GYNECOLOGIC HISTORY:   . P: 0. AB: 0.  Last menstrual period:  sp hysterectomy  Age at menarche: 14  Age at first childbirth: N/A  Lactation/How long: N/A  Age at menopause:  50s  Total years of oral contraceptive use: 1 yr  Total years of hormone replacement therapy: 0      PHYSICAL EXAMINATION:   Vitals:    12/29/21 1123   BP: 162/98   Pulse: 69   SpO2: 97%     ECOG 0 - Asymptomatic  General: NAD, well appearing  Psych: a&o x 3, normal mood and affect  Eyes: EOMI, no scleral icterus  ENMT: neck supple without masses or thyromegaly, mucus membranes moist  Resp: normal effort, CTAB  CV: RRR, no murmurs, no edema  GI: soft, NT, ND  MSK: normal gait, normal ROM in bilateral shoulders  Lymph nodes: no cervical, supraclavicular or axillary lymphadenopathy  Breast: symmetric, large size, grade 2/glandular ptosis  Right: Wise pattern scars, otherwise no visible abnormalities on inspection while seated, with arms raised or hands on hips. No masses or nipple abnormalities.  Left: Wise pattern scars, otherwise no visible abnormalities on inspection while seated, with arms raised or hands on hips. At 10:00, 5 cm FN, there is a firm 2 cm mass. No nipple abnormalities.    Left breast, in-office ultrasound: At 10:00, 5 cm FN, there is an irregular hypoechoic mass with biopsy clip visualized. This is located about 1 cm deep to the skin.       I have independently reviewed her imaging and here are my findings:   In the left upper inner breast, there is an irregular mass which measures 2.8 cm maximally on MRI.      Assessment:  61 y.o. F with a new diagnosis of left breast cancer: Intermediate grade, invasive ductal carcinoma, ER/IA+, Her2 negative; clinical T2N0, anatomic stage IIA, prognostic stage IB.      Discussion:  I had an extensive discussion with the patient and her family about the nature of her breast cancer diagnosis. We reviewed the components of breast tissue including ducts and lobules. We reviewed her pathology report in detail. We reviewed breast cancer histology, including stage, grade, ER/IA receptors, HER2 receptors and how this applies to her diagnosis. We reviewed the basics of  systemic and local/regional management of breast cancer.      We discussed that in her case, systemic treatment would involve endocrine therapy and possibly chemotherapy (she had already partially discussed this earlier today with Dr. King). We reviewed potential surgical treatments to include partial mastectomy, mastectomy, sentinel lymph node biopsy and axillary node dissection and discussed the rationale associated with each approach. Regarding radiation therapy, we discussed that radiation is indicated in all cases of breast conservation and in only limited circumstances following mastectomy. We discussed that the primary goal of adjuvant radiation is to decrease the likelihood of local recurrence.     In her case, she could be a candidate for breast conservation, however this will be a relatively large volume lumpectomy in the upper inner quadrant. This would be best done with the assistance of plastic surgery for oncoplastic closure. She understands that this will result in a smaller overall breast size and she is okay with this. We discussed the risk of positive margins and that she must have negative margins for lumpectomy to be an appropriate oncologic procedure. I will make every effort to obtain negative margins at her initial operation, but there is a 10-15% chance that she will require a second operation for re-excision, or possibly a total mastectomy. We will not know the margin status until after her final pathology has returned.     We discussed that most breast cancer is not hereditary, however given her personal and family history, this may play a role in her case. Genetic testing was sent earlier today by Dr. King. This could affect surgical decision making. Should the results show a pathologic mutation, I would recommend a mastectomy on the cancer side and a contralateral risk-reduction mastectomy. She understands that this would not be for the treatment of her left breast cancer and will  not improve her prognosis long term. This would be to reduce her risk of a second, primary breast cancer. If she is negative for a mutation, then I would not recommend a bilateral mastectomy, since her risk of a second primary cancer would be relatively low and endocrine therapy will further reduce her risk.    We discussed axillary staging. I described the procedure for sentinel lymph node biopsy in detail, including the preoperative injection of a radiotracer and intraoperative injection of lymphazurin blue dye. I explained that this is a mapping test and not a cancer test, that all of the lymph nodes containing these dyes will be removed for complete testing by pathology, and that the results could impact the decision for adjuvant treatment or additional surgery.    I described additional risks and potential complications associated with surgery, including, but not limited to, bleeding, infection, complications related to blue dye, lymphedema, deformity/poor cosmetic result, chronic pain, neurovascular injury, numbness, seroma, hematoma, deep venous thrombosis, skin flap necrosis, disease recurrence and the possibility of requiring additional surgery. We also discussed other treatment options including the option of not undergoing any surgical treatment and the risks associated with this including disease progression. She expressed an understanding of these factors and wished to proceed.    Plan:  -F/u genetic testing. I will call her with results.  -Plastic surgery referral.   -Left ultrasound-guided partial mastectomy and sentinel lymph node biopsy with oncoplastic closure.  -Continue follow-up with Dr. King.  -Will refer to radiation oncology postoperatively.    Beth Delong MD    I spent 90 minutes caring for Daysi on this date of service. This time includes time spent by me in the following activities: preparing for the visit, performing a medically appropriate examination and/or evaluation ,  counseling and educating the patient/family/caregiver, referring and communicating with other health care professionals , documenting information in the medical record and independently interpreting results and communicating that information with the patient/family/caregiver.      CC:  DO Simon Muñiz MD

## 2022-01-04 ENCOUNTER — TELEPHONE (OUTPATIENT)
Dept: SURGERY | Facility: CLINIC | Age: 62
End: 2022-01-04

## 2022-01-04 ENCOUNTER — TRANSCRIBE ORDERS (OUTPATIENT)
Dept: SURGERY | Facility: CLINIC | Age: 62
End: 2022-01-04

## 2022-01-04 DIAGNOSIS — C50.212 MALIGNANT NEOPLASM OF UPPER-INNER QUADRANT OF LEFT BREAST IN FEMALE, ESTROGEN RECEPTOR POSITIVE: Primary | ICD-10-CM

## 2022-01-04 DIAGNOSIS — Z17.0 MALIGNANT NEOPLASM OF UPPER-INNER QUADRANT OF LEFT BREAST IN FEMALE, ESTROGEN RECEPTOR POSITIVE: Primary | ICD-10-CM

## 2022-01-04 NOTE — PROGRESS NOTES
"Chief Complaint  Establish Care (Recon bilateral breast recon with Dr. Delnog )    Subjective          History of Present Illness  Daysi Lugo is a 61 y.o. female who presents to [facility] for pre op for  Bilateral breast reconstruction with Dr. Delong on 01/17/2022. She has history of left breast cancer and she is planned to have a left lumpectomy. She comes today to be evaluated for breast reconstruction options.     Allergies: Hydrocodone and Penicillins  Allergies Reconciled.    Review of Systems   All reviewed and negative except the ones above    Objective     /96 (BP Location: Right arm, Patient Position: Sitting)   Temp 96.3 °F (35.7 °C) (Temporal)   Ht 170.2 cm (67\")   Wt 90.7 kg (200 lb)   BMI 31.32 kg/m²     Body mass index is 31.32 kg/m².    Physical Exam     Awake, alert, oriented  Respiration is non elaborated  Non tachycardic   Abdomen is non distended.   Masses: No masses  Nipple Discharge: No nipple discharge  Breast Symmetry:  Axillary Lymphadenopathy: No axillary lymphadenopathy  SN-N (Right Breast): 27  SN-N (Left Breast):26  N-IMF (Right Breast):14  N-IMF (Left Breast):14  Base Width (Right Breast):21  Base Width (Left Breast):20  Schnur Scale Score:  Body surface area is 2.02 meters squared.   Previous breast reduction scars noted  Left upper medial biopsy site noted.      Result Review :   The following data was reviewed by: Simon Castro MD on 01/10/2022:         Assessment and Plan      Diagnoses and all orders for this visit:    1. Malignant neoplasm of upper-inner quadrant of left breast in female, estrogen receptor positive (HCC) (Primary)        Plan:  • Breast reconstruction options (Tissue Expander/Implant versus Autologous) were all discussed with the patient at length.  In addition, we discussed options for symmetry procedures and nipple reconstruction.  The patient understands that her reconstructed breast " will not have the same sensation as a natural breast and that visible incision lines will always be present.  In addition, patient understand that breast reconstruction is a multi-stage procedure that can take months to years to complete.   • We will send information for prior authorization.  Photos were obtained.   • Patient was given the breast reconstruction pamphlet as well as directed to the ASPS website for additional information.   • The patient was made aware that the FDA has discovered rare occurrences between an uncommon form of lymphoma (anaplastic large cell) and women with breast implants.  While the incidence is quite low, the risk of development is real; therefore, any unusual symptoms or signs (most commonly a late fluid collection years later) should be brought to the attention of your physician.  Patient also counseled on risks and benefits of saline versus silicone implants, lifting restrictions, scar placement, risk of capsular contracture, animation deformity, need for likely revision of breast implants at some point in her life, FDA recommendation of MRI every three years to monitor for rupture, and continued mammography for breast cancer surveillance.   • We will have patient obtain pre-op clearance from their PCP.  • We had a long discussion with the patient and her family today regarding her reconstructive options.  These include no reconstruction, reconstruction at the time of mastectomy or lumpectomy, and finally delayed reconstruction.  We discussed specific types of reconstruction, including: tissue expander and/or implants, and autologous reconstruction with either pedicled or free flap.  We also covered the later stages of reconstruction, including nipple reconstruction and areola tattooing, fat grafting, and symmetry procedures if indicated or desired.   • I described the typical darlene-operative course of each procedure, including the nature of the procedure, hospital stay, necessity  for drains, post-operative activity restriction, and postoperative visits (including those for tissue expansion).  We also discussed the time frame for reconstruction.  I shared information about saline vs. silicone implants, including their differences, risk of capsular contracture, rippling, or leak/rupture, and safety/monitoring issues.  I described Alloderm and its use for implant reconstruction. We discussed that radiation therapy, if she ultimately requires it, may alter the reconstructive options.     • We reviewed surgical risks including, but not limited to, infection, bleeding, hematoma, seroma, pain, scarring, numbness, skin or nipple loss, wound healing problems, flap failures including partial or complete flap loss, asymmetry, need for revisions or further surgeries,  and risks associated with anesthesia.   • Additional risks with autologous reconstruction include donor wound morbidities, such as hernias or bulges, wound healing problems, muscle weakness, partial or complete flap loss, and typical surgical risks as listed above.   • The use of biological mesh is not for soft tissue reinforcement. The use of mesh is necessary because the mastectomy dissection pocket is larger than the implant itself. The intention of the mesh is to restrain the displacement of the implant to the axilla, which is a very common complication requiring revision. In my experience, the use of mesh avoids that specific complication and very often avoids a second surgery. The use of mesh also allows me to perform a safer procedure since it holds the implant in place and alleviates the pressure over the skin that depends only on the subdermal blood supply. Without the mesh, I believe I wouldn't be able to perform direct implant reconstruction and give the optimal result that patient desires and deserves.   •  Specifically on her case,  I explained to her she has the option to have an implant or not. I explained with an implant,  we can be more aggressive with her lumpectomy. The implant also protects her left breast from post radiation atrophy. That would give her the best cosmetic look with upper breast projection. Since her cancer is located at that area, she has risks to have an indentation if no implant is used. If she doesn't want to use implants, if she develops a depression, that can be corrected with fat graft. She understands and would like to proceed with bilateral breast reconstruction with implant placement. I will reduce her lateral inferior breast bilaterally to improve cosmetic results.   • Consent:  Right breast reduction/mastopexy with implant placement, left breast oncoplastic closure with implant placement, bilateral chest liposuction.   • Target implant size: 230 MP cc  •   • I spent 90 minutes explaining to patient and her  about her procedure and after care.   •   • CPT codes:   • 69394-27 - immediate insertion of breast prosthesis following reconstruction   • 42480-09  implantation of biologic implant  • 77366-62- breast reduction /mastopexy   •   • Implants:  • Sientra Smooth round gel implant  HP 93724-548, 235, 255,  x2  -         MP 46981-639, 210, 230 x2  • Expander:no expander  • Single use sizer [id number] x1  •   • Mesh: galaflex [id number]  •     OR time: 2.5 hours           I spent 60 minutes caring for Daysi on this date of service. This time includes time spent by me in the following activities:obtaining and/or reviewing a separately obtained history, performing a medically appropriate examination and/or evaluation , counseling and educating the patient/family/caregiver and documenting information in the medical record    Follow Up     No follow-ups on file.    Patient was given instructions and counseling regarding her condition. Please see specific information pulled into the AVS if appropriate.     Simon Castro MD  01/10/2022

## 2022-01-04 NOTE — TELEPHONE ENCOUNTER
Appointment with Dr. Castro is scheduled on 1/10/2022 @ 8:15am.    Surgery is scheduled on 1/17/2022 @ 1:00pm, SI @ 12:00pm, patient arrival 10:00am.    PAT is scheduled on 1/12/2022 @ 1:30pm.    Post-op appointment with Dr. Delong is scheduled on 2/2/2022 @ 2:00pm.    Called and spoke to patient, patient expressed v/u of appointment time.    Sent patient a reminder letter in the mail.

## 2022-01-05 ENCOUNTER — NURSE NAVIGATOR (OUTPATIENT)
Dept: OTHER | Facility: HOSPITAL | Age: 62
End: 2022-01-05

## 2022-01-05 NOTE — PROGRESS NOTES
Referral received from Dr. Delong's office. I called Ms. Lugo and introduced myself and navigational services. She states the plan is to have a lumpectomy on Jan 17th. She will have her preadmission testing on Jan 12th and was agreeable to meet afterward. She has a good understanding of her pathology and treatment options and is comfortable with her plan of care.     She stated her  passed away in October and she is still recovering from that. She stated she feels numb still but has friends and family who have been supportive during this time. We discussed that we have Supportive Oncology Services if the need arises. She was thankful for the information and will reach out if she needs it.     She verbalized appreciation for navigational services and she has my contact information and will call with any questions that arise.

## 2022-01-10 ENCOUNTER — OFFICE VISIT (OUTPATIENT)
Dept: PLASTIC SURGERY | Facility: CLINIC | Age: 62
End: 2022-01-10

## 2022-01-10 VITALS
SYSTOLIC BLOOD PRESSURE: 162 MMHG | DIASTOLIC BLOOD PRESSURE: 96 MMHG | BODY MASS INDEX: 31.39 KG/M2 | TEMPERATURE: 96.3 F | HEIGHT: 67 IN | WEIGHT: 200 LBS

## 2022-01-10 DIAGNOSIS — C50.212 MALIGNANT NEOPLASM OF UPPER-INNER QUADRANT OF LEFT BREAST IN FEMALE, ESTROGEN RECEPTOR POSITIVE: Primary | ICD-10-CM

## 2022-01-10 DIAGNOSIS — Z17.0 MALIGNANT NEOPLASM OF UPPER-INNER QUADRANT OF LEFT BREAST IN FEMALE, ESTROGEN RECEPTOR POSITIVE: Primary | ICD-10-CM

## 2022-01-10 PROCEDURE — 99205 OFFICE O/P NEW HI 60 MIN: CPT | Performed by: SURGERY

## 2022-01-10 RX ORDER — CLINDAMYCIN HYDROCHLORIDE 150 MG/1
150 CAPSULE ORAL 3 TIMES DAILY
Qty: 30 CAPSULE | Refills: 0 | Status: SHIPPED | OUTPATIENT
Start: 2022-01-10 | End: 2022-02-07

## 2022-01-10 RX ORDER — PROMETHAZINE HYDROCHLORIDE 12.5 MG/1
12.5 TABLET ORAL EVERY 6 HOURS PRN
Qty: 20 TABLET | Refills: 0 | Status: SHIPPED | OUTPATIENT
Start: 2022-01-10 | End: 2022-02-02

## 2022-01-10 RX ORDER — OXYCODONE HYDROCHLORIDE AND ACETAMINOPHEN 5; 325 MG/1; MG/1
1 TABLET ORAL EVERY 6 HOURS PRN
Qty: 30 TABLET | Refills: 0 | Status: SHIPPED | OUTPATIENT
Start: 2022-01-10 | End: 2022-02-02

## 2022-01-10 RX ORDER — CLINDAMYCIN PHOSPHATE 900 MG/50ML
900 INJECTION INTRAVENOUS ONCE
Status: CANCELLED | OUTPATIENT
Start: 2022-01-17 | End: 2022-01-10

## 2022-01-11 LAB — REF LAB TEST RESULTS: NORMAL

## 2022-01-12 ENCOUNTER — PRE-ADMISSION TESTING (OUTPATIENT)
Dept: PREADMISSION TESTING | Facility: HOSPITAL | Age: 62
End: 2022-01-12

## 2022-01-12 ENCOUNTER — NURSE NAVIGATOR (OUTPATIENT)
Dept: OTHER | Facility: HOSPITAL | Age: 62
End: 2022-01-12

## 2022-01-12 ENCOUNTER — RESEARCH ENCOUNTER (OUTPATIENT)
Dept: ONCOLOGY | Facility: CLINIC | Age: 62
End: 2022-01-12

## 2022-01-12 ENCOUNTER — OFFICE VISIT (OUTPATIENT)
Dept: FAMILY MEDICINE CLINIC | Facility: CLINIC | Age: 62
End: 2022-01-12

## 2022-01-12 VITALS
TEMPERATURE: 98.5 F | RESPIRATION RATE: 18 BRPM | HEIGHT: 67 IN | DIASTOLIC BLOOD PRESSURE: 94 MMHG | HEART RATE: 87 BPM | BODY MASS INDEX: 32.93 KG/M2 | SYSTOLIC BLOOD PRESSURE: 174 MMHG | OXYGEN SATURATION: 95 % | WEIGHT: 209.8 LBS

## 2022-01-12 VITALS
OXYGEN SATURATION: 98 % | BODY MASS INDEX: 32.71 KG/M2 | HEART RATE: 71 BPM | TEMPERATURE: 97.4 F | SYSTOLIC BLOOD PRESSURE: 176 MMHG | DIASTOLIC BLOOD PRESSURE: 98 MMHG | HEIGHT: 67 IN | WEIGHT: 208.4 LBS

## 2022-01-12 DIAGNOSIS — E11.9 TYPE 2 DIABETES MELLITUS WITHOUT COMPLICATION, WITHOUT LONG-TERM CURRENT USE OF INSULIN: ICD-10-CM

## 2022-01-12 DIAGNOSIS — I10 PRIMARY HYPERTENSION: Primary | ICD-10-CM

## 2022-01-12 LAB
ANION GAP SERPL CALCULATED.3IONS-SCNC: 10 MMOL/L (ref 5–15)
BUN SERPL-MCNC: 14 MG/DL (ref 6–20)
BUN/CREAT SERPL: 15.7 (ref 7–25)
CALCIUM SPEC-SCNC: 8.9 MG/DL (ref 8.6–10.5)
CHLORIDE SERPL-SCNC: 104 MMOL/L (ref 98–107)
CO2 SERPL-SCNC: 29 MMOL/L (ref 22–29)
CREAT SERPL-MCNC: 0.89 MG/DL (ref 0.57–1)
DEPRECATED RDW RBC AUTO: 43.6 FL (ref 37–54)
ERYTHROCYTE [DISTWIDTH] IN BLOOD BY AUTOMATED COUNT: 13.4 % (ref 12.3–15.4)
GFR SERPL CREATININE-BSD FRML MDRD: 65 ML/MIN/1.73
GLUCOSE SERPL-MCNC: 190 MG/DL (ref 65–99)
HCT VFR BLD AUTO: 39.8 % (ref 34–46.6)
HGB BLD-MCNC: 13.3 G/DL (ref 12–15.9)
MCH RBC QN AUTO: 30 PG (ref 26.6–33)
MCHC RBC AUTO-ENTMCNC: 33.4 G/DL (ref 31.5–35.7)
MCV RBC AUTO: 89.8 FL (ref 79–97)
PLATELET # BLD AUTO: 208 10*3/MM3 (ref 140–450)
PMV BLD AUTO: 11.1 FL (ref 6–12)
POTASSIUM SERPL-SCNC: 3.8 MMOL/L (ref 3.5–5.2)
RBC # BLD AUTO: 4.43 10*6/MM3 (ref 3.77–5.28)
SODIUM SERPL-SCNC: 143 MMOL/L (ref 136–145)
WBC NRBC COR # BLD: 6.49 10*3/MM3 (ref 3.4–10.8)

## 2022-01-12 PROCEDURE — 85027 COMPLETE CBC AUTOMATED: CPT

## 2022-01-12 PROCEDURE — 99214 OFFICE O/P EST MOD 30 MIN: CPT | Performed by: FAMILY MEDICINE

## 2022-01-12 PROCEDURE — 36415 COLL VENOUS BLD VENIPUNCTURE: CPT

## 2022-01-12 PROCEDURE — 80048 BASIC METABOLIC PNL TOTAL CA: CPT

## 2022-01-12 RX ORDER — LISINOPRIL 10 MG/1
10 TABLET ORAL DAILY
Qty: 30 TABLET | Refills: 0 | Status: SHIPPED | OUTPATIENT
Start: 2022-01-12 | End: 2022-02-07 | Stop reason: SDUPTHER

## 2022-01-12 NOTE — PROGRESS NOTES
Met Ms. Lugo after her pre admission testing today. I introduced myself and navigational services. She is scheduled for a lumpectomy on Jan 17th. She has a good understanding of her pathology and treatment options and is comfortable with her plan of care.      We juan pabloy discussed integrative therapies and other services at [facility]. I gave her a navigation folder with the following information:     [facility] Support Network, Sharing Our Stories Breast Cancer Support Group, Cancer and Restorative Exercise (CARE), [facility] Exercise program, Together for Breast Cancer Survival, Guide for the Newly Diagnosed, [facility], [facility], Massage Therapy, Reiki Therapy, [place] [place], Cancer Nutrition, and Survivorship Clinic.     She verbalized appreciation for navigational services and she has my contact information and will call with any questions that arise.

## 2022-01-12 NOTE — RESEARCH
Informed consent worksheet for the protocol: THR-CS-001    Consent version 11 Mar 2021 with the approval dated 23 Jul 2021    Subject ID [id number]      The patient was seen in the office today by Dr. King and identified as a candidate for the THR-CS-001.     The following people were present at the consent conference:   Patient: Daysi Lugo   : Smiley Fuller RN       The following was discussed with the patient prior to signing the informed consent.    • The study purposes   • Procedures   • Duration of study participation  • New findings   • Risks   • Discomforts  • Any known side effects when applicable    • The alternative treatments   • Benefits   • Patient and insurance costs   • Payments if applicable     • Patient's accountability and responsibilities    • The voluntary nature of research participants     • Right to withdraw consent    • The withdrawal process    • Confidentiality   • Authorization to use and disclose information for research purposes - Including who can view information and the types of information shared.    The patient was informed of what to do in case of an illness or injury resulting from the study and who to contact for more trial information, or information on rights and welfare as a research volunteer. The patient was given the  contact Information and clinical trial contact information.     The patient was given opportunity for questions and opportunity to discuss this with family and friends. The  and or sub investigators were also available for any questions. The patient verbalizes understanding and is willing to sign the informed consent.     After all questions were satisfied the patient signed the informed consent and a copy of the signed main informed consent form & any sub-study informed consent forms were given to the patient.    No study-specific procedures were completed prior to patient signing study informed  consent form(s)    The  and or sub investigator is aware of the subject's participation status.

## 2022-01-12 NOTE — DISCHARGE INSTRUCTIONS
Take the following medications the morning of surgery:  NONE    If you are on prescription narcotic pain medication to control your pain you may also take that medication the morning of surgery.    General Instructions:  • Do not eat solid food after midnight the night before surgery.  • You may drink clear liquids day of surgery but must stop at least one hour before your hospital arrival time. CLEAR LIQUIDS UNTIL 9:00 AM  • It is beneficial for you to have a clear drink that contains carbohydrates the day of surgery.  We suggest a 12 to 20 ounce bottle of Gatorade or Powerade for non-diabetic patients or a 12 to 20 ounce bottle of G2 or Powerade Zero for diabetic patients. (Pediatric patients, are not advised to drink a 12 to 20 ounce carbohydrate drink)    Clear liquids are liquids you can see through.  Nothing red in color.     Plain water                               Sports drinks  Sodas                                   Gelatin (Jell-O)  Fruit juices without pulp such as white grape juice and apple juice  Popsicles that contain no fruit or yogurt  Tea or coffee (no cream or milk added)  Gatorade / Powerade  G2 / Powerade Zero    • Infants may have breast milk up to four hours before surgery.  • Infants drinking formula may drink formula up to six hours before surgery.   • Patients who avoid smoking, chewing tobacco and alcohol for 4 weeks prior to surgery have a reduced risk of post-operative complications.  Quit smoking as many days before surgery as you can.  • Do not smoke, use chewing tobacco or drink alcohol the day of surgery.   • If applicable bring your C-PAP/ BI-PAP machine.  • Bring any papers given to you in the doctor’s office.  • Wear clean comfortable clothes.  • Do not wear contact lenses, false eyelashes or make-up.  Bring a case for your glasses.   • Bring crutches or walker if applicable.  • Remove all piercings.  Leave jewelry and any other valuables at home.  • Hair extensions with metal  clips must be removed prior to surgery.  • The Pre-Admission Testing nurse will instruct you to bring medications if unable to obtain an accurate list in Pre-Admission Testing.        If you were given a blood bank ID arm band remember to bring it with you the day of surgery.    Preventing a Surgical Site Infection:  • For 2 to 3 days before surgery, avoid shaving with a razor because the razor can irritate skin and make it easier to develop an infection.    • Any areas of open skin can increase the risk of a post-operative wound infection by allowing bacteria to enter and travel throughout the body.  Notify your surgeon if you have any skin wounds / rashes even if it is not near the expected surgical site.  The area will need assessed to determine if surgery should be delayed until it is healed.  • The night prior to surgery shower using a fresh bar of anti-bacterial soap (such as Dial) and clean washcloth.  Sleep in a clean bed with clean clothing.  Do not allow pets to sleep with you.  • Shower on the morning of surgery using a fresh bar of anti-bacterial soap (such as Dial) and clean washcloth.  Dry with a clean towel and dress in clean clothing.  • Ask your surgeon if you will be receiving antibiotics prior to surgery.  • Make sure you, your family, and all healthcare providers clean their hands with soap and water or an alcohol based hand  before caring for you or your wound.    Day of surgery: 1/17/2022 ARRIVAL TIME 10:00  Your arrival time is approximately two hours before your scheduled surgery time.  Upon arrival, a Pre-op nurse and Anesthesiologist will review your health history, obtain vital signs, and answer questions you may have.  The only belongings needed at this time will be a list of your home medications and if applicable your C-PAP/BI-PAP machine.  A Pre-op nurse will start an IV and you may receive medication in preparation for surgery, including something to help you relax.      Please be aware that surgery does come with discomfort.  We want to make every effort to control your discomfort so please discuss any uncontrolled symptoms with your nurse.   Your doctor will most likely have prescribed pain medications.      If you are going home after surgery you will receive individualized written care instructions before being discharged.  A responsible adult must drive you to and from the hospital on the day of your surgery and stay with you for 24 hours.  Discharge prescriptions can be filled by the hospital pharmacy during regular pharmacy hours.  If you are having surgery late in the day/evening your prescription may be e-prescribed to your pharmacy.  Please verify your pharmacy hours or chose a 24 hour pharmacy to avoid not having access to your prescription because your pharmacy has closed for the day.    If you are staying overnight following surgery, you will be transported to your hospital room following the recovery period.  [facility] has all private rooms.    If you have any questions please call Pre-Admission Testing at [phone].  Deductibles and co-payments are collected on the day of service. Please be prepared to pay the required co-pay, deductible or deposit on the day of service as defined by your plan.    Patient Education for Self-Quarantine Process    • Following your COVID testing, we strongly recommend that you wear a mask when you are with other people and practice social distancing.   • Limit your activities to only required outings.  • Wash your hands with soap and water frequently for at least 20 seconds.   • Avoid touching your eyes, nose and mouth with unwashed hands.  • Do not share anything - utensils, drinking glasses, food from the same bowl.   • Sanitize household surfaces daily. Include all high touch areas (door handles, light switches, phones, countertops, etc.)    Call your surgeon immediately if you experience any of the following  symptoms:  • Sore Throat  • Shortness of Breath or difficulty breathing  • Cough  • Chills  • Body soreness or muscle pain  • Headache  • Fever  • New loss of taste or smell  • Do not arrive for your surgery ill.  Your procedure will need to be rescheduled to another time.  You will need to call your physician before the day of surgery to avoid any unnecessary exposure to hospital staff as well as other patients.    CHLORHEXIDINE CLOTH INSTRUCTIONS  The morning of surgery follow these instructions using the Chlorhexidine cloths you've been given.  These steps reduce bacteria on the body.  Do not use the cloths near your eyes, ears mouth, genitalia or on open wounds.  Throw the cloths away after use but do not try to flush them down a toilet.      • Open and remove one cloth at a time from the package.    • Leave the cloth unfolded and begin the bathing.  • Massage the skin with the cloths using gentle pressure to remove bacteria.  Do not scrub harshly.   • Follow the steps below with one 2% CHG cloth per area (6 total cloths).  • One cloth for neck, shoulders and chest.  • One cloth for both arms, hands, fingers and underarms (do underarms last).  • One cloth for the abdomen followed by groin.  • One cloth for right leg and foot including between the toes.  • One cloth for left leg and foot including between the toes.  • The last cloth is to be used for the back of the neck, back and buttocks.    Allow the CHG to air dry 3 minutes on the skin which will give it time to work and decrease the chance of irritation.  The skin may feel sticky until it is dry.  Do not rinse with water or any other liquid or you will lose the beneficial effects of the CHG.  If mild skin irritation occurs, do rinse the skin to remove the CHG.  Report this to the nurse at time of admission.  Do not apply lotions, creams, ointments, deodorants or perfumes after using the clothes. Dress in clean clothes before coming to the hospital.

## 2022-01-12 NOTE — PROGRESS NOTES
Answers for HPI/ROS submitted by the patient on 1/5/2022  What is the primary reason for your visit?: Diabetes  Diabetes type: type 2  MedicAlert ID: No  Disease duration: 2 months  blurred vision: No  chest pain: No  fatigue: Yes  foot paresthesias: No  foot ulcerations: No  polydipsia: No  polyphagia: No  polyuria: Yes  visual change: No  weakness: No  weight loss: No  confusion: No  dizziness: No  headaches: No  hunger: No  mood changes: No  nervous/anxious: No  pallor: No  seizures: No  sleepiness: Yes  speech difficulty: No  sweats: Yes  tremors: No  blackouts: No  hospitalization: No  nocturnal hypoglycemia: No  required assistance: No  required glucagon: No  CVA: No  heart disease: No  impotence: No  nephropathy: No  peripheral neuropathy: No  PVD: No  retinopathy: No  CAD risks: family history, hypertension  Current treatments: diet, oral agent (monotherapy)  Treatment compliance: most of the time  Home blood tests: 1-2 x per day  Monitoring compliance: good  Weight trend: fluctuating minimally  Current diet: generally healthy  Meal planning: none  Exercise: intermittently  Dietitian visit: No  Eye exam current: Yes  Sees podiatrist: No    Chief Complaint  Hypertension (check up)    Subjective          Daysi Lugo presents to [facility]  Patient is here to follow up on new onset of type 2 diabetes.  She was diagnosed by a A1c of 8.0 on September 23, 2021.  Currently she is taking Metformin 1000 mg twice daily.  She is tolerating the medication well and denies any side effects.  Blood sugars are between 175 and 210.      Hypertension. Patient has had a right nephrectomy for kidney cancer in 1991.  She was started on lisinopril 5 mg daily 12/16/2021.  She denies any side effects and has been faithfully taking the medication every day.  Her blood pressures are still elevated.  Patient denies any chest pain or shortness of breath or headaches.    Diabetes  She has type 2  "diabetes mellitus. No MedicAlert identification noted. The initial diagnosis of diabetes was made 2 months ago. Hypoglycemia symptoms include sleepiness and sweats. Pertinent negatives for hypoglycemia include no confusion, dizziness, headaches, hunger, mood changes, nervousness/anxiousness, pallor, seizures, speech difficulty or tremors. Associated symptoms include fatigue and polyuria. Pertinent negatives for diabetes include no blurred vision, no chest pain, no foot paresthesias, no foot ulcerations, no polydipsia, no polyphagia, no visual change, no weakness and no weight loss. Pertinent negatives for hypoglycemia complications include no blackouts, no hospitalization, no nocturnal hypoglycemia, no required assistance and no required glucagon injection. Pertinent negatives for diabetic complications include no CVA, heart disease, impotence, nephropathy, peripheral neuropathy, PVD or retinopathy. Risk factors for coronary artery disease include family history and hypertension. Current diabetic treatment includes diet and oral agent (monotherapy). She is compliant with treatment most of the time. Her weight is fluctuating minimally. She is following a generally healthy diet. When asked about meal planning, she reported none. She has not had a previous visit with a dietitian. She participates in exercise intermittently. She monitors blood glucose at home 1-2 x per day. Blood glucose monitoring compliance is good. She does not see a podiatrist.Eye exam is current.       Objective   Vital Signs:   /98 (BP Location: Left arm, Patient Position: Sitting)   Pulse 71   Temp 97.4 °F (36.3 °C)   Ht 170.2 cm (67\")   Wt 94.5 kg (208 lb 6.4 oz)   SpO2 98%   BMI 32.64 kg/m²     Physical Exam  Vitals and nursing note reviewed.   Constitutional:       Appearance: She is well-developed.   HENT:      Head: Normocephalic and atraumatic.      Right Ear: External ear normal.      Left Ear: External ear normal.      Nose: " Nose normal.   Eyes:      General: No scleral icterus.     Conjunctiva/sclera: Conjunctivae normal.   Cardiovascular:      Rate and Rhythm: Normal rate and regular rhythm.      Heart sounds: Normal heart sounds.   Pulmonary:      Effort: Pulmonary effort is normal.      Breath sounds: Normal breath sounds.   Musculoskeletal:      Cervical back: Normal range of motion and neck supple.   Lymphadenopathy:      Cervical: No cervical adenopathy.   Skin:     General: Skin is warm and dry.      Findings: No rash.   Neurological:      Mental Status: She is alert and oriented to person, place, and time.   Psychiatric:         Mood and Affect: Mood normal.         Behavior: Behavior normal.         Thought Content: Thought content normal.         Judgment: Judgment normal.        Result Review :   The following data was reviewed by: Zarina Tineo DO on 01/12/2022:  Common labs    Common Labsle 12/23/21 12/29/21 1/12/22 1/12/22      1342 1342   Glucose    190 (A)   BUN    14   Creatinine 1.20   0.89   eGFR Non African Am    65   Sodium    143   Potassium    3.8   Chloride    104   Calcium    8.9   WBC  6.86 6.49    Hemoglobin  14.2 13.3    Hematocrit  41.3 39.8    Platelets  206 208    (A) Abnormal value       Comments are available for some flowsheets but are not being displayed.           TSH    TSH 9/23/21   TSH 2.260                     Assessment and Plan    Diagnoses and all orders for this visit:    1. Primary hypertension (Primary)  -     Comprehensive Metabolic Panel  -     lisinopril (PRINIVIL,ZESTRIL) 10 MG tablet; Take 1 tablet by mouth Daily.  Dispense: 30 tablet; Refill: 0    2. Type 2 diabetes mellitus without complication, without long-term current use of insulin ([facility])    Patient is here today to follow-up on uncontrolled hypertension.  I will increase the lisinopril to 10 mg daily for better blood pressure control.  Patient will follow-up in 3 weeks.    Patient is also here today to follow-up on  uncontrolled diabetes.  Her blood sugars are still elevated but we will give the metformin maximum dosage more time to work.  No changes in medication at this time.  Patient is also working on improving her diet and plans to exercise also.      Follow Up   Return in about 20 days (around 2/1/2022) for HTN.  Patient was given instructions and counseling regarding her condition or for health maintenance advice. Please see specific information pulled into the AVS if appropriate.     .dlb

## 2022-01-13 LAB
ALBUMIN SERPL-MCNC: 4.5 G/DL (ref 3.8–4.9)
ALBUMIN/GLOB SERPL: 2.4 {RATIO} (ref 1.2–2.2)
ALP SERPL-CCNC: 123 IU/L (ref 44–121)
ALT SERPL-CCNC: 21 IU/L (ref 0–32)
AST SERPL-CCNC: 15 IU/L (ref 0–40)
BILIRUB SERPL-MCNC: 0.3 MG/DL (ref 0–1.2)
BUN SERPL-MCNC: 14 MG/DL (ref 6–24)
BUN/CREAT SERPL: 18 (ref 9–23)
CALCIUM SERPL-MCNC: 9.6 MG/DL (ref 8.7–10.2)
CHLORIDE SERPL-SCNC: 105 MMOL/L (ref 96–106)
CO2 SERPL-SCNC: 27 MMOL/L (ref 20–29)
CREAT SERPL-MCNC: 0.79 MG/DL (ref 0.57–1)
GLOBULIN SER CALC-MCNC: 1.9 G/DL (ref 1.5–4.5)
GLUCOSE SERPL-MCNC: 132 MG/DL (ref 65–99)
POTASSIUM SERPL-SCNC: 4.4 MMOL/L (ref 3.5–5.2)
PROT SERPL-MCNC: 6.4 G/DL (ref 6–8.5)
SODIUM SERPL-SCNC: 145 MMOL/L (ref 134–144)

## 2022-01-14 ENCOUNTER — TELEPHONE (OUTPATIENT)
Dept: SURGERY | Facility: CLINIC | Age: 62
End: 2022-01-14

## 2022-01-14 ENCOUNTER — LAB (OUTPATIENT)
Dept: LAB | Facility: HOSPITAL | Age: 62
End: 2022-01-14

## 2022-01-14 ENCOUNTER — PATIENT ROUNDING (BHMG ONLY) (OUTPATIENT)
Dept: PLASTIC SURGERY | Facility: CLINIC | Age: 62
End: 2022-01-14

## 2022-01-14 LAB — SARS-COV-2 ORF1AB RESP QL NAA+PROBE: NOT DETECTED

## 2022-01-14 PROCEDURE — U0004 COV-19 TEST NON-CDC HGH THRU: HCPCS

## 2022-01-14 PROCEDURE — C9803 HOPD COVID-19 SPEC COLLECT: HCPCS

## 2022-01-14 NOTE — PROGRESS NOTES
A [employer] message has been sent to the patient for [place] [facility]

## 2022-01-17 ENCOUNTER — APPOINTMENT (OUTPATIENT)
Dept: GENERAL RADIOLOGY | Facility: HOSPITAL | Age: 62
End: 2022-01-17

## 2022-01-17 ENCOUNTER — APPOINTMENT (OUTPATIENT)
Dept: NUCLEAR MEDICINE | Facility: HOSPITAL | Age: 62
End: 2022-01-17

## 2022-01-17 ENCOUNTER — ANESTHESIA (OUTPATIENT)
Dept: PERIOP | Facility: HOSPITAL | Age: 62
End: 2022-01-17

## 2022-01-17 ENCOUNTER — HOSPITAL ENCOUNTER (OUTPATIENT)
Dept: NUCLEAR MEDICINE | Facility: HOSPITAL | Age: 62
Discharge: HOME | End: 2022-01-17

## 2022-01-17 ENCOUNTER — ANESTHESIA EVENT (OUTPATIENT)
Dept: PERIOP | Facility: HOSPITAL | Age: 62
End: 2022-01-17

## 2022-01-17 ENCOUNTER — HOSPITAL ENCOUNTER (OUTPATIENT)
Facility: HOSPITAL | Age: 62
Discharge: HOME | End: 2022-01-18
Attending: SURGERY | Admitting: SURGERY

## 2022-01-17 DIAGNOSIS — Z17.0 MALIGNANT NEOPLASM OF UPPER-INNER QUADRANT OF LEFT BREAST IN FEMALE, ESTROGEN RECEPTOR POSITIVE: ICD-10-CM

## 2022-01-17 DIAGNOSIS — C50.212 MALIGNANT NEOPLASM OF UPPER-INNER QUADRANT OF LEFT BREAST IN FEMALE, ESTROGEN RECEPTOR POSITIVE: ICD-10-CM

## 2022-01-17 LAB
GLUCOSE BLDC GLUCOMTR-MCNC: 147 MG/DL (ref 70–130)
GLUCOSE BLDC GLUCOMTR-MCNC: 171 MG/DL (ref 70–130)

## 2022-01-17 PROCEDURE — 88305 TISSUE EXAM BY PATHOLOGIST: CPT | Performed by: SURGERY

## 2022-01-17 PROCEDURE — G0378 HOSPITAL OBSERVATION PER HR: HCPCS

## 2022-01-17 PROCEDURE — 19380 REVJ RECONSTRUCTED BREAST: CPT | Performed by: SURGERY

## 2022-01-17 PROCEDURE — 25010000002 ONDANSETRON PER 1 MG: Performed by: NURSE ANESTHETIST, CERTIFIED REGISTERED

## 2022-01-17 PROCEDURE — 38900 IO MAP OF SENT LYMPH NODE: CPT | Performed by: SURGERY

## 2022-01-17 PROCEDURE — 25010000002 MIDAZOLAM PER 1 MG: Performed by: ANESTHESIOLOGY

## 2022-01-17 PROCEDURE — 25010000002 EPINEPHRINE PER 0.1 MG: Performed by: SURGERY

## 2022-01-17 PROCEDURE — A9520 TC99 TILMANOCEPT DIAG 0.5MCI: HCPCS | Performed by: SURGERY

## 2022-01-17 PROCEDURE — C9290 INJ, BUPIVACAINE LIPOSOME: HCPCS | Performed by: SURGERY

## 2022-01-17 PROCEDURE — 25010000002 PROPOFOL 10 MG/ML EMULSION: Performed by: NURSE ANESTHETIST, CERTIFIED REGISTERED

## 2022-01-17 PROCEDURE — 25010000002 FENTANYL CITRATE (PF) 50 MCG/ML SOLUTION: Performed by: NURSE ANESTHETIST, CERTIFIED REGISTERED

## 2022-01-17 PROCEDURE — 19301 PARTIAL MASTECTOMY: CPT | Performed by: SURGERY

## 2022-01-17 PROCEDURE — 25010000002 DEXAMETHASONE PER 1 MG: Performed by: NURSE ANESTHETIST, CERTIFIED REGISTERED

## 2022-01-17 PROCEDURE — 25010000002 HYDRALAZINE PER 20 MG: Performed by: NURSE ANESTHETIST, CERTIFIED REGISTERED

## 2022-01-17 PROCEDURE — C1789 PROSTHESIS, BREAST, IMP: HCPCS | Performed by: SURGERY

## 2022-01-17 PROCEDURE — 25010000002 HYDROMORPHONE PER 4 MG: Performed by: NURSE ANESTHETIST, CERTIFIED REGISTERED

## 2022-01-17 PROCEDURE — 0 TECHETIUM TC99M TILMANOCEPT: Performed by: SURGERY

## 2022-01-17 PROCEDURE — 82962 GLUCOSE BLOOD TEST: CPT

## 2022-01-17 PROCEDURE — 78195 LYMPH SYSTEM IMAGING: CPT | Performed by: SURGERY

## 2022-01-17 PROCEDURE — 76098 X-RAY EXAM SURGICAL SPECIMEN: CPT

## 2022-01-17 PROCEDURE — 76998 US GUIDE INTRAOP: CPT | Performed by: SURGERY

## 2022-01-17 PROCEDURE — 0 BUPIVACAINE LIPOSOME 1.3 % SUSPENSION 20 ML VIAL: Performed by: SURGERY

## 2022-01-17 PROCEDURE — 38525 BIOPSY/REMOVAL LYMPH NODES: CPT | Performed by: SURGERY

## 2022-01-17 PROCEDURE — 25010000002 HEPARIN (PORCINE) PER 1000 UNITS: Performed by: SURGERY

## 2022-01-17 PROCEDURE — 25010000002 GENTAMICIN PER 80 MG: Performed by: SURGERY

## 2022-01-17 PROCEDURE — C1889 IMPLANT/INSERT DEVICE, NOC: HCPCS | Performed by: SURGERY

## 2022-01-17 PROCEDURE — 38792 RA TRACER ID OF SENTINL NODE: CPT

## 2022-01-17 PROCEDURE — 88307 TISSUE EXAM BY PATHOLOGIST: CPT | Performed by: SURGERY

## 2022-01-17 DEVICE — LIGACLIP MCA MULTIPLE CLIP APPLIERS, 20 SMALL CLIPS
Type: IMPLANTABLE DEVICE | Site: BREAST | Status: FUNCTIONAL
Brand: LIGACLIP

## 2022-01-17 RX ORDER — EPHEDRINE SULFATE 50 MG/ML
5 INJECTION, SOLUTION INTRAVENOUS ONCE AS NEEDED
Status: DISCONTINUED | OUTPATIENT
Start: 2022-01-17 | End: 2022-01-17 | Stop reason: HOSPADM

## 2022-01-17 RX ORDER — PROMETHAZINE HYDROCHLORIDE 25 MG/1
25 SUPPOSITORY RECTAL ONCE AS NEEDED
Status: DISCONTINUED | OUTPATIENT
Start: 2022-01-17 | End: 2022-01-17 | Stop reason: HOSPADM

## 2022-01-17 RX ORDER — PROPOFOL 10 MG/ML
VIAL (ML) INTRAVENOUS AS NEEDED
Status: DISCONTINUED | OUTPATIENT
Start: 2022-01-17 | End: 2022-01-17 | Stop reason: SURG

## 2022-01-17 RX ORDER — METFORMIN HYDROCHLORIDE 500 MG/1
1000 TABLET, EXTENDED RELEASE ORAL 2 TIMES DAILY WITH MEALS
Status: DISCONTINUED | OUTPATIENT
Start: 2022-01-17 | End: 2022-01-18 | Stop reason: HOSPADM

## 2022-01-17 RX ORDER — MAGNESIUM HYDROXIDE 1200 MG/15ML
LIQUID ORAL AS NEEDED
Status: DISCONTINUED | OUTPATIENT
Start: 2022-01-17 | End: 2022-01-17 | Stop reason: HOSPADM

## 2022-01-17 RX ORDER — DIPHENHYDRAMINE HCL 25 MG
25 CAPSULE ORAL
Status: DISCONTINUED | OUTPATIENT
Start: 2022-01-17 | End: 2022-01-17 | Stop reason: HOSPADM

## 2022-01-17 RX ORDER — SODIUM CHLORIDE, SODIUM LACTATE, POTASSIUM CHLORIDE, CALCIUM CHLORIDE 600; 310; 30; 20 MG/100ML; MG/100ML; MG/100ML; MG/100ML
9 INJECTION, SOLUTION INTRAVENOUS CONTINUOUS
Status: DISCONTINUED | OUTPATIENT
Start: 2022-01-17 | End: 2022-01-18 | Stop reason: HOSPADM

## 2022-01-17 RX ORDER — FLUMAZENIL 0.1 MG/ML
0.2 INJECTION INTRAVENOUS AS NEEDED
Status: DISCONTINUED | OUTPATIENT
Start: 2022-01-17 | End: 2022-01-17 | Stop reason: HOSPADM

## 2022-01-17 RX ORDER — CLINDAMYCIN HYDROCHLORIDE 150 MG/1
150 CAPSULE ORAL 3 TIMES DAILY
Status: DISCONTINUED | OUTPATIENT
Start: 2022-01-17 | End: 2022-01-18 | Stop reason: HOSPADM

## 2022-01-17 RX ORDER — HYDRALAZINE HYDROCHLORIDE 20 MG/ML
5 INJECTION INTRAMUSCULAR; INTRAVENOUS
Status: DISCONTINUED | OUTPATIENT
Start: 2022-01-17 | End: 2022-01-17 | Stop reason: HOSPADM

## 2022-01-17 RX ORDER — OXYCODONE HYDROCHLORIDE AND ACETAMINOPHEN 5; 325 MG/1; MG/1
1 TABLET ORAL EVERY 4 HOURS PRN
Status: DISCONTINUED | OUTPATIENT
Start: 2022-01-17 | End: 2022-01-18 | Stop reason: HOSPADM

## 2022-01-17 RX ORDER — ONDANSETRON 2 MG/ML
INJECTION INTRAMUSCULAR; INTRAVENOUS AS NEEDED
Status: DISCONTINUED | OUTPATIENT
Start: 2022-01-17 | End: 2022-01-17 | Stop reason: SURG

## 2022-01-17 RX ORDER — FENTANYL CITRATE 50 UG/ML
INJECTION, SOLUTION INTRAMUSCULAR; INTRAVENOUS AS NEEDED
Status: DISCONTINUED | OUTPATIENT
Start: 2022-01-17 | End: 2022-01-17 | Stop reason: SURG

## 2022-01-17 RX ORDER — CLINDAMYCIN PHOSPHATE 900 MG/50ML
900 INJECTION INTRAVENOUS ONCE
Status: COMPLETED | OUTPATIENT
Start: 2022-01-17 | End: 2022-01-17

## 2022-01-17 RX ORDER — HYDROCODONE BITARTRATE AND ACETAMINOPHEN 7.5; 325 MG/1; MG/1
1 TABLET ORAL ONCE AS NEEDED
Status: DISCONTINUED | OUTPATIENT
Start: 2022-01-17 | End: 2022-01-17 | Stop reason: HOSPADM

## 2022-01-17 RX ORDER — HYDROMORPHONE HYDROCHLORIDE 1 MG/ML
0.5 INJECTION, SOLUTION INTRAMUSCULAR; INTRAVENOUS; SUBCUTANEOUS
Status: DISCONTINUED | OUTPATIENT
Start: 2022-01-17 | End: 2022-01-17 | Stop reason: HOSPADM

## 2022-01-17 RX ORDER — ONDANSETRON 2 MG/ML
4 INJECTION INTRAMUSCULAR; INTRAVENOUS ONCE AS NEEDED
Status: COMPLETED | OUTPATIENT
Start: 2022-01-17 | End: 2022-01-17

## 2022-01-17 RX ORDER — OXYCODONE HYDROCHLORIDE AND ACETAMINOPHEN 5; 325 MG/1; MG/1
1 TABLET ORAL EVERY 6 HOURS PRN
Status: DISCONTINUED | OUTPATIENT
Start: 2022-01-17 | End: 2022-01-17 | Stop reason: SDUPTHER

## 2022-01-17 RX ORDER — LABETALOL HYDROCHLORIDE 5 MG/ML
5 INJECTION, SOLUTION INTRAVENOUS
Status: DISCONTINUED | OUTPATIENT
Start: 2022-01-17 | End: 2022-01-17 | Stop reason: HOSPADM

## 2022-01-17 RX ORDER — LIDOCAINE HYDROCHLORIDE 10 MG/ML
0.5 INJECTION, SOLUTION EPIDURAL; INFILTRATION; INTRACAUDAL; PERINEURAL ONCE AS NEEDED
Status: DISCONTINUED | OUTPATIENT
Start: 2022-01-17 | End: 2022-01-17 | Stop reason: HOSPADM

## 2022-01-17 RX ORDER — DIAZEPAM 5 MG/1
10 TABLET ORAL ONCE
Status: COMPLETED | OUTPATIENT
Start: 2022-01-17 | End: 2022-01-17

## 2022-01-17 RX ORDER — EPHEDRINE SULFATE 50 MG/ML
INJECTION, SOLUTION INTRAVENOUS AS NEEDED
Status: DISCONTINUED | OUTPATIENT
Start: 2022-01-17 | End: 2022-01-17 | Stop reason: SURG

## 2022-01-17 RX ORDER — LIDOCAINE HYDROCHLORIDE 20 MG/ML
INJECTION, SOLUTION INFILTRATION; PERINEURAL AS NEEDED
Status: DISCONTINUED | OUTPATIENT
Start: 2022-01-17 | End: 2022-01-17 | Stop reason: SURG

## 2022-01-17 RX ORDER — PROMETHAZINE HYDROCHLORIDE 25 MG/1
25 TABLET ORAL ONCE AS NEEDED
Status: DISCONTINUED | OUTPATIENT
Start: 2022-01-17 | End: 2022-01-17 | Stop reason: HOSPADM

## 2022-01-17 RX ORDER — AMOXICILLIN 250 MG
2 CAPSULE ORAL 2 TIMES DAILY PRN
Status: DISCONTINUED | OUTPATIENT
Start: 2022-01-17 | End: 2022-01-18 | Stop reason: HOSPADM

## 2022-01-17 RX ORDER — PROMETHAZINE HYDROCHLORIDE 12.5 MG/1
12.5 TABLET ORAL EVERY 6 HOURS PRN
Status: DISCONTINUED | OUTPATIENT
Start: 2022-01-17 | End: 2022-01-18 | Stop reason: HOSPADM

## 2022-01-17 RX ORDER — IBUPROFEN 600 MG/1
600 TABLET ORAL ONCE AS NEEDED
Status: DISCONTINUED | OUTPATIENT
Start: 2022-01-17 | End: 2022-01-17 | Stop reason: HOSPADM

## 2022-01-17 RX ORDER — MIDAZOLAM HYDROCHLORIDE 1 MG/ML
1 INJECTION INTRAMUSCULAR; INTRAVENOUS
Status: DISCONTINUED | OUTPATIENT
Start: 2022-01-17 | End: 2022-01-17 | Stop reason: HOSPADM

## 2022-01-17 RX ORDER — HYDROMORPHONE HCL 110MG/55ML
PATIENT CONTROLLED ANALGESIA SYRINGE INTRAVENOUS AS NEEDED
Status: DISCONTINUED | OUTPATIENT
Start: 2022-01-17 | End: 2022-01-17 | Stop reason: SURG

## 2022-01-17 RX ORDER — LISINOPRIL 20 MG/1
10 TABLET ORAL DAILY
Status: DISCONTINUED | OUTPATIENT
Start: 2022-01-18 | End: 2022-01-18 | Stop reason: HOSPADM

## 2022-01-17 RX ORDER — HEPARIN SODIUM 5000 [USP'U]/ML
5000 INJECTION, SOLUTION INTRAVENOUS; SUBCUTANEOUS
Status: COMPLETED | OUTPATIENT
Start: 2022-01-17 | End: 2022-01-17

## 2022-01-17 RX ORDER — OXYCODONE AND ACETAMINOPHEN 7.5; 325 MG/1; MG/1
1 TABLET ORAL EVERY 4 HOURS PRN
Status: DISCONTINUED | OUTPATIENT
Start: 2022-01-17 | End: 2022-01-17 | Stop reason: HOSPADM

## 2022-01-17 RX ORDER — DEXAMETHASONE SODIUM PHOSPHATE 10 MG/ML
INJECTION INTRAMUSCULAR; INTRAVENOUS AS NEEDED
Status: DISCONTINUED | OUTPATIENT
Start: 2022-01-17 | End: 2022-01-17 | Stop reason: SURG

## 2022-01-17 RX ORDER — FENTANYL CITRATE 50 UG/ML
50 INJECTION, SOLUTION INTRAMUSCULAR; INTRAVENOUS
Status: DISCONTINUED | OUTPATIENT
Start: 2022-01-17 | End: 2022-01-17 | Stop reason: HOSPADM

## 2022-01-17 RX ORDER — HYDROMORPHONE HYDROCHLORIDE 1 MG/ML
0.5 INJECTION, SOLUTION INTRAMUSCULAR; INTRAVENOUS; SUBCUTANEOUS
Status: DISCONTINUED | OUTPATIENT
Start: 2022-01-17 | End: 2022-01-18 | Stop reason: HOSPADM

## 2022-01-17 RX ORDER — DIPHENHYDRAMINE HYDROCHLORIDE 50 MG/ML
12.5 INJECTION INTRAMUSCULAR; INTRAVENOUS
Status: DISCONTINUED | OUTPATIENT
Start: 2022-01-17 | End: 2022-01-17 | Stop reason: HOSPADM

## 2022-01-17 RX ORDER — FAMOTIDINE 10 MG/ML
20 INJECTION, SOLUTION INTRAVENOUS ONCE
Status: COMPLETED | OUTPATIENT
Start: 2022-01-17 | End: 2022-01-17

## 2022-01-17 RX ORDER — SODIUM CHLORIDE 0.9 % (FLUSH) 0.9 %
3-10 SYRINGE (ML) INJECTION AS NEEDED
Status: DISCONTINUED | OUTPATIENT
Start: 2022-01-17 | End: 2022-01-17 | Stop reason: HOSPADM

## 2022-01-17 RX ORDER — LIDOCAINE AND PRILOCAINE 25; 25 MG/G; MG/G
1 CREAM TOPICAL ONCE
Status: COMPLETED | OUTPATIENT
Start: 2022-01-17 | End: 2022-01-17

## 2022-01-17 RX ORDER — ROCURONIUM BROMIDE 10 MG/ML
INJECTION, SOLUTION INTRAVENOUS AS NEEDED
Status: DISCONTINUED | OUTPATIENT
Start: 2022-01-17 | End: 2022-01-17 | Stop reason: SURG

## 2022-01-17 RX ORDER — NALOXONE HCL 0.4 MG/ML
0.1 VIAL (ML) INJECTION
Status: DISCONTINUED | OUTPATIENT
Start: 2022-01-17 | End: 2022-01-18 | Stop reason: HOSPADM

## 2022-01-17 RX ORDER — NALOXONE HCL 0.4 MG/ML
0.2 VIAL (ML) INJECTION AS NEEDED
Status: DISCONTINUED | OUTPATIENT
Start: 2022-01-17 | End: 2022-01-17 | Stop reason: HOSPADM

## 2022-01-17 RX ORDER — SODIUM CHLORIDE 0.9 % (FLUSH) 0.9 %
3 SYRINGE (ML) INJECTION EVERY 12 HOURS SCHEDULED
Status: DISCONTINUED | OUTPATIENT
Start: 2022-01-17 | End: 2022-01-17 | Stop reason: HOSPADM

## 2022-01-17 RX ADMIN — SODIUM CHLORIDE, POTASSIUM CHLORIDE, SODIUM LACTATE AND CALCIUM CHLORIDE: 600; 310; 30; 20 INJECTION, SOLUTION INTRAVENOUS at 15:37

## 2022-01-17 RX ADMIN — CLINDAMYCIN PHOSPHATE 900 MG: 900 INJECTION, SOLUTION INTRAVENOUS at 13:02

## 2022-01-17 RX ADMIN — PROPOFOL 150 MG: 10 INJECTION, EMULSION INTRAVENOUS at 13:14

## 2022-01-17 RX ADMIN — OXYCODONE AND ACETAMINOPHEN 1 TABLET: 5; 325 TABLET ORAL at 22:03

## 2022-01-17 RX ADMIN — HYDROMORPHONE HYDROCHLORIDE 0.5 MG: 2 INJECTION, SOLUTION INTRAMUSCULAR; INTRAVENOUS; SUBCUTANEOUS at 16:10

## 2022-01-17 RX ADMIN — DOCUSATE SODIUM 50MG AND SENNOSIDES 8.6MG 2 TABLET: 8.6; 5 TABLET, FILM COATED ORAL at 22:03

## 2022-01-17 RX ADMIN — ONDANSETRON HYDROCHLORIDE 4 MG: 2 SOLUTION INTRAMUSCULAR; INTRAVENOUS at 18:44

## 2022-01-17 RX ADMIN — DIAZEPAM 10 MG: 5 TABLET ORAL at 10:48

## 2022-01-17 RX ADMIN — FENTANYL CITRATE 100 MCG: 0.05 INJECTION, SOLUTION INTRAMUSCULAR; INTRAVENOUS at 13:12

## 2022-01-17 RX ADMIN — LIDOCAINE AND PRILOCAINE 1 APPLICATION: 25; 25 CREAM TOPICAL at 10:23

## 2022-01-17 RX ADMIN — CLINDAMYCIN HYDROCHLORIDE 150 MG: 150 CAPSULE ORAL at 23:38

## 2022-01-17 RX ADMIN — EPHEDRINE SULFATE 10 MG: 50 INJECTION INTRAVENOUS at 14:17

## 2022-01-17 RX ADMIN — METFORMIN HYDROCHLORIDE 1000 MG: 500 TABLET, EXTENDED RELEASE ORAL at 23:39

## 2022-01-17 RX ADMIN — MIDAZOLAM 1 MG: 1 INJECTION INTRAMUSCULAR; INTRAVENOUS at 11:54

## 2022-01-17 RX ADMIN — TILMANOCEPT 1 DOSE: KIT at 11:30

## 2022-01-17 RX ADMIN — FAMOTIDINE 20 MG: 10 INJECTION INTRAVENOUS at 11:52

## 2022-01-17 RX ADMIN — ONDANSETRON 4 MG: 2 INJECTION INTRAMUSCULAR; INTRAVENOUS at 15:37

## 2022-01-17 RX ADMIN — ROCURONIUM BROMIDE 50 MG: 50 INJECTION INTRAVENOUS at 13:14

## 2022-01-17 RX ADMIN — SODIUM CHLORIDE, POTASSIUM CHLORIDE, SODIUM LACTATE AND CALCIUM CHLORIDE 9 ML/HR: 600; 310; 30; 20 INJECTION, SOLUTION INTRAVENOUS at 10:25

## 2022-01-17 RX ADMIN — HYDRALAZINE HYDROCHLORIDE 5 MG: 20 INJECTION INTRAMUSCULAR; INTRAVENOUS at 17:50

## 2022-01-17 RX ADMIN — HYDRALAZINE HYDROCHLORIDE 5 MG: 20 INJECTION INTRAMUSCULAR; INTRAVENOUS at 17:38

## 2022-01-17 RX ADMIN — DEXAMETHASONE SODIUM PHOSPHATE 8 MG: 10 INJECTION INTRAMUSCULAR; INTRAVENOUS at 13:24

## 2022-01-17 RX ADMIN — EPHEDRINE SULFATE 5 MG: 50 INJECTION INTRAVENOUS at 14:15

## 2022-01-17 RX ADMIN — EPHEDRINE SULFATE 5 MG: 50 INJECTION INTRAVENOUS at 16:23

## 2022-01-17 RX ADMIN — LIDOCAINE HYDROCHLORIDE 60 MG: 20 INJECTION, SOLUTION INFILTRATION; PERINEURAL at 13:14

## 2022-01-17 RX ADMIN — HEPARIN SODIUM 5000 UNITS: 5000 INJECTION INTRAVENOUS; SUBCUTANEOUS at 11:57

## 2022-01-17 NOTE — OP NOTE
Operative Report    Patient Name:  Daysi Lugo  YOB: 1960    Date of Surgery:  1/17/2022    Pre-op Diagnosis:   Malignant neoplasm of upper-inner quadrant of left breast in female, estrogen receptor positive (HCC) [C50.212, Z17.0]       Post-Op Diagnosis:  Malignant neoplasm of upper-inner quadrant of left breast in female, estrogen receptor positive (HCC) [C50.212, Z17.0]    Procedures:  Left ultrasound-guided partial mastectomy and sentinel lymph node biopsy      Staff:  Surgeon:  Beth Delong MD     Anesthesia: General    Estimated Blood Loss: 15 mL    Implants:    Implant Name Type Inv. Item Serial No.  Lot No. LRB No. Used Action   CLIPAPPLR M/ ENDO LIGACLIP 9 3/8IN SM - [id number] Implant CLIPAPPLR M/ ENDO LIGACLIP 9 3/8IN   [facility]  DIV [employer] J [employer] J . Left 1 Implanted          1 Implanted       Specimen:          Specimens     ID Source Type Tests Collected By Collected At Frozen?    A Breast, Left Tissue · TISSUE PATHOLOGY EXAM   Beth Dleong MD 1/17/22 1349 No    Description: Left partial mastectoomy, ink marks margins, fresh for perm    Comment: Fresh for permanent    B Breast, Left Tissue · TISSUE PATHOLOGY EXAM   Beth Delong MD 1/17/22 1402 No    Description: Aditional medial, inferior, and lateral margins* ink marks margins, fresh for perm    C Breast, Left Tissue · TISSUE PATHOLOGY EXAM   Beth Delong MD 1/17/22 1407 No    Description: Aditional posterior margin, ink marks margins, fresh for perm    D [place] Lymph Node Tissue · TISSUE PATHOLOGY EXAM   Beth Delong MD 1/17/22 1418 No    Description: left axilla sentinel node #1. hot and blue. count 1300          Findings: Mass with calcifications and clip seen in specimen radiograph.    Complications: None     Indications: The patient is a 61 y.o. F with a new diagnosis of left breast cancer: Intermediate grade, invasive ductal carcinoma, ER/MN+, Her2 negative;  clinical T2N0, anatomic stage IIA, prognostic stage IB. She is a candidate for lumpectomy and desires breast conservation. Because of the volume of tissue that will be resected relative to her breast size and the upper inner location, this case will be done in conjunction with Dr. Castro for oncoplastic closure. The risks and benefits were discussed preoperatively and she understood and agreed to proceed.       Description of Procedure:  Preoperatively, in the nuclear medicine suite, the periareolar skin was injected with Lymphoseek. The patient was identified in the preoperative area and brought to the operating room and placed supine on the table. Patient verification was performed and general anesthesia was induced. The left breast was cleansed with alcohol prep and 5 ml of lymphazurin blue was injected in the retroareolar area. She tolerated this well. I used intraoperative ultrasound to examine the lesion of interest. I marked the location of the lesion and intended area for resection. This was located in the upper inner quadrant, just beyond the mastopexy keyhole marked by Dr. Castro. The patient was prepped and draped in sterile fashion with the left arm prepped into the field. A time out was performed and all were in agreement. I confirmed that the patient had received preoperative antibiotics and chemical prophylaxis.      I began with the partial mastectomy. I made an incision through the superior aspect of the mastopexy keyhole and carried this down through the dermis with sharp dissection. Flaps were raised according to the planned dissection. An anterior flap was raised first. Dissection was then carried out superiorly, inferiorly, medially, and laterally according to the planned area of resection. The posterior dissection extended to the pectoralis major muscle. The specimen, including the pectoralis fascia was lifted off the underlying muscle and removed. The specimen was oriented with paint  (red - superior, green - anterior, blue - inferior, yellow - medial, orange - lateral, black - posterior). Specimen radiograph showed the mass with calcifications and clip slightly close to the inferior margin, so additional margins were taken. An additional lateral, inferior, and medial margin was taken as a single specimen. Preoperative imaging demonstrated that the lesion was close to the underlying pectoralis muscle, so an additional posterior margin was taken. This consisted of a thin superficial layer of pectoralis muscle fibers. These were inked with the corresponding colors and ink marked the true margins. The breast cavity was irrigated and hemostasis achieved. Marking clips were placed in the base of the breast cavity.    I then proceeded with the sentinel node biopsy. The gamma probe was used to find the area of increased uptake in the axilla. A small incision was made below the hairbearing region of the axilla. Dissection was taken down through the fat and subcutaneous tissue until the clavipectoral fascia was reached. The fascia was incised and the axilla entered. A blue channel was encountered. This was followed down to a hot & blue node, which was carefully excised, taking care to clip all lymphatic channels. Ex vivo counts were 1300 and the specimen appeared to encompass 2 adjacent lymph nodes. Background count was 15, there were no additional hot or blue nodes, and the node biopsy was terminated.      In the axilla, the clavipectoral fascia was reapproximated with interrupted 3-0 vicryl suture. The deep dermis of the axillary wound was closed with interrupted 3-0 vicryl suture. The skin was closed with 4-0 subcuticular running monocryl and covered with dermabond. Counts were correct at the end of my portion of the case. Dr. Castro then continued with closure.    Beth Delong MD     Date: 1/17/2022  Time: 14:49 EST

## 2022-01-17 NOTE — ANESTHESIA PROCEDURE NOTES
Airway  Urgency: elective    Date/Time: 1/17/2022 1:17 PM  Airway not difficult    General Information and Staff    Patient location during procedure: OR  CRNA: Zarina Owusu CRNA    Indications and Patient Condition  Indications for airway management: airway protection    Preoxygenated: yes  Mask difficulty assessment: 1 - vent by mask    Final Airway Details  Final airway type: endotracheal airway      Successful airway: ETT  Cuffed: yes   Successful intubation technique: direct laryngoscopy  Endotracheal tube insertion site: oral  Blade: Rosa  Blade size: 3  ETT size (mm): 7.0  Cormack-Lehane Classification: grade I - full view of glottis  Placement verified by: chest auscultation and capnometry   Cuff volume (mL): 6  Measured from: lips  ETT/EBT  to lips (cm): 20  Number of attempts at approach: 1  Assessment: lips, teeth, and gum same as pre-op and atraumatic intubation    Additional Comments  Smooth IV induction. Trachea intubated. Cuff up. Ett secured. BEBS. Dentition intact without injury.

## 2022-01-17 NOTE — ANESTHESIA PREPROCEDURE EVALUATION
" Anesthesia Evaluation     Patient summary reviewed and Nursing notes reviewed   history of anesthetic complications: prolonged sedation  NPO Solid Status: > 8 hours  NPO Liquid Status: > 2 hours           Airway   Mallampati: II  Dental      Pulmonary    (+) a smoker Former,   Cardiovascular   Exercise tolerance: good (4-7 METS)    Rhythm: regular    (+) hypertension,       Neuro/Psych- negative ROS  GI/Hepatic/Renal/Endo    (+)   renal disease, diabetes mellitus type 2,     ROS Comment: S/p right nephrectomy 1991    Musculoskeletal     Abdominal    Substance History - negative use     OB/GYN negative ob/gyn ROS         Other   arthritis,    history of cancer active                    Anesthesia Plan    ASA 3     general   (/96 (BP Location: Right arm, Patient Position: Lying)   Pulse 72   Temp 36.7 °C (98 °F) (Oral)   Resp 16   Ht 170.2 cm (67\")   Wt 92.2 kg (203 lb 4.8 oz)   LMP  (LMP Unknown)   SpO2 95%   BMI 31.84 kg/m²     I have reviewed the patient's history with the patient and the chart, including all pertinent laboratory results and imaging. I have explained the risks of anesthesia including but not limited to dental damage, corneal abrasion, nerve injury, MI, stroke, and death.    )  intravenous induction     Anesthetic plan, all risks, benefits, and alternatives have been provided, discussed and informed consent has been obtained with: patient.      "

## 2022-01-18 ENCOUNTER — READMISSION MANAGEMENT (OUTPATIENT)
Dept: CALL CENTER | Facility: HOSPITAL | Age: 62
End: 2022-01-18

## 2022-01-18 VITALS
RESPIRATION RATE: 16 BRPM | HEART RATE: 89 BPM | DIASTOLIC BLOOD PRESSURE: 73 MMHG | OXYGEN SATURATION: 97 % | HEIGHT: 67 IN | SYSTOLIC BLOOD PRESSURE: 123 MMHG | WEIGHT: 203.3 LBS | BODY MASS INDEX: 31.91 KG/M2 | TEMPERATURE: 97 F

## 2022-01-18 PROCEDURE — G0378 HOSPITAL OBSERVATION PER HR: HCPCS

## 2022-01-18 PROCEDURE — 99024 POSTOP FOLLOW-UP VISIT: CPT | Performed by: SURGERY

## 2022-01-18 RX ADMIN — LISINOPRIL 10 MG: 20 TABLET ORAL at 08:54

## 2022-01-18 RX ADMIN — METFORMIN HYDROCHLORIDE 1000 MG: 500 TABLET, EXTENDED RELEASE ORAL at 08:53

## 2022-01-18 RX ADMIN — OXYCODONE AND ACETAMINOPHEN 1 TABLET: 5; 325 TABLET ORAL at 01:57

## 2022-01-18 RX ADMIN — CLINDAMYCIN HYDROCHLORIDE 150 MG: 150 CAPSULE ORAL at 08:54

## 2022-01-18 RX ADMIN — OXYCODONE AND ACETAMINOPHEN 1 TABLET: 5; 325 TABLET ORAL at 06:52

## 2022-01-18 NOTE — PROGRESS NOTES
Breast Surgery Progress Note    Chief complaint: sp surgery    Subjective:  Ms. Lugo did well overnight. She has eaten, has been up out of bed, and her pain is controlled.     Objective:  Vitals:    01/18/22 0537   BP: 119/64   Pulse: 94   Resp: 16   Temp: 98.4 °F (36.9 °C)   SpO2: 95%       Physical Exam:  General: NAD  HEENT: NCAT, EOMI  Lungs: respirations nonlabored  Chest: left mastopexy and left axillary incisions c/d/i, flaps and NAC healthy    Assessment:  61 y.o. female POD 1 left partial mastectomy & SLNB    Plan:  -Discharge home later this morning per Dr. Castro.   -She already has a follow-up appointment with me scheduled.  -I will call her with pathology results.    Beth Delong MD

## 2022-01-18 NOTE — PROGRESS NOTES
Case Management Discharge Note      Final Note: Discharged home. Betzy Hinton, TORIN         Transportation Services  Private: Car    Final Discharge Disposition Code: 01 - home or self-care

## 2022-01-18 NOTE — PLAN OF CARE
Goal Outcome Evaluation:  Plan of Care Reviewed With: patient        Progress: improving  Outcome Summary: Went over the plan of care and answered all questions. Vitals stable ans pain is well controlled. All medications given without complications and patient up ambulating. No other issues this shift.

## 2022-01-18 NOTE — PROGRESS NOTES
Continued Stay Note  [facility]     Patient Name: Daysi Lugo  MRN: [id number]  Today's Date: 1/18/2022    Admit Date: 1/17/2022     Discharge Plan     Row Name 01/18/22 0927       Plan    Plan Home no needs    Plan Comments Discharge order noted. Met with patient who confirmed DC plan is home. Stated her friend will assist as needed and will provide transportation at DC. Denies any needs/equipment.               Discharge Codes    No documentation.               Expected Discharge Date and Time     Expected Discharge Date Expected Discharge Time    Jan 17, 2022             Betzy Hinton RN

## 2022-01-18 NOTE — DISCHARGE SUMMARY
Physician Discharge Summary  Patient Identification:  Name: Daysi Lugo  Age: 61 y.o.  Sex: female  :  1960  MRN: [id number]    Admit date: 2022    Discharge date and time: 2022    Admitting Physician: Beth Delong MD     Discharge Physician: Beth Delong MD    Admission Diagnoses: Malignant neoplasm of upper-inner quadrant of left breast in female, estrogen receptor positive (HCC) [C50.212, Z17.0]  Malignant neoplasm of upper-inner quadrant of left breast in female, estrogen receptor positive (HCC) [C50.212, Z17.0]    Discharge Diagnoses: Malignant neoplasm of upper-inner quadrant of left breast in female, estrogen receptor positive (HCC) [C50.212, Z17.0]  Malignant neoplasm of upper-inner quadrant of left breast in female, estrogen receptor positive (HCC) [C50.212, Z17.0]    Discharged Condition: good    Admission HPI:    Procedures:  Procedure(s) (LRB):  Left ultrasound-guided partial mastectomy and sentinel lymph node biopsy (Left)  Right breast reduction/mastopexy, left breast oncoplastic closure, bilateral chest liposuction (Bilateral)    Hospital Course:   The pt was brought to the OR on 2022 for the above procedure. Patient  tolerated the procedure well, see dictated operative note for detailed summary. At the time of discharge patient was tolerating a regular diet and having bowel movements. Also, at the time of discharge  pain was controlled on oral medication and patient was ambulating without assistance.     Consults:   None    Significant Diagnostic Studies: labs and radiology    Discharge Exam:  GEN: no acute distress, resting quietly   HEENT: NCAT, EOMI, MMM   HEART: normal rate, regular rhythm   LUNGS: respirations non-labored   ABD: soft, nondistended, nontender   EXT: moves all extremities, no edema    Disposition:  Home    Patient Instructions:   Ok for regular diet  Do not drive for next 2 weeks  Ok to shower but be aware you are a fall risk so have  someone with you or place a chair in the shower. It is ok to wet the breast. Wash the drain site with water and liquid soap everyday. No sponge or cloths. Wash the drain site before other parts of the body  Strip drains q 8 hours and record drain output daily. Wash hands or wear gloves when manipulating drains.  Do not exercise for the next 2 weeks.  Sleep in an upright position  No bra for 1 week. After that, wear a comfortable soft bra  No exercise  Ok to move arms slowly to the shoulder level (brushing hair movement)     Follow-up Information     Zarina Tineo DO .    Specialty: Family Medicine  Contact information:  [address]  [place] [id number] [phone]                             Malignant neoplasm of upper-inner quadrant of left breast in female, estrogen receptor positive (HCC)      Signed:  Simon Richardson MD, PhD  Plastic and reconstructive surgery   1/18/2022

## 2022-01-18 NOTE — PLAN OF CARE
Goal Outcome Evaluation:  Plan of Care Reviewed With: patient        Progress: improving  Outcome Summary: recieved from PACU after lt breast mastectomy with rt breast reduction, sana breast dressed with gauze and a border dressing, tolerating clear liquid diet, encourage to increase fluid intake and to use incentive spirometer, voiding freely, ambulated in the hallway, continue to monitor the pt.

## 2022-01-18 NOTE — ANESTHESIA POSTPROCEDURE EVALUATION
"Patient: Daysi Lugo    Procedure Summary     Date: 01/17/22 Room / Location: [facility] OR 09 / [facility] MAIN OR    Anesthesia Start: 1306 Anesthesia Stop: 1728    Procedures:       Left ultrasound-guided partial mastectomy and sentinel lymph node biopsy (Left Breast)      Right breast reduction/mastopexy, left breast oncoplastic closure, bilateral chest liposuction (Bilateral Breast) Diagnosis:       Malignant neoplasm of upper-inner quadrant of left breast in female, estrogen receptor positive (HCC)      (Malignant neoplasm of upper-inner quadrant of left breast in female, estrogen receptor positive (HCC) [C50.212, Z17.0])    Surgeons: Beth Delong MD; Simon Castro MD Provider: Bhavin Laguna MD    Anesthesia Type: general ASA Status: 3          Anesthesia Type: general    Vitals  Vitals Value Taken Time   /84 01/17/22 1931   Temp 36.6 °C (97.8 °F) 01/17/22 1830   Pulse 113 01/17/22 1942   Resp 14 01/17/22 1830   SpO2 91 % 01/17/22 1942   Vitals shown include unvalidated device data.        Post Anesthesia Care and Evaluation    Patient location during evaluation: PACU  Patient participation: complete - patient participated  Level of consciousness: awake  Pain management: adequate  Airway patency: patent  Anesthetic complications: No anesthetic complications    Cardiovascular status: acceptable  Respiratory status: acceptable  Hydration status: acceptable    Comments: /83 (BP Location: Right arm, Patient Position: Lying)   Pulse 111   Temp 36.6 °C (97.8 °F) (Oral)   Resp 14   Ht 170.2 cm (67\")   Wt 92.2 kg (203 lb 4.8 oz)   LMP  (LMP Unknown)   SpO2 96%   BMI 31.84 kg/m²         "

## 2022-01-18 NOTE — PROGRESS NOTES
PLASTIC SURGERY PROGRESS NOTE    Patient Identification:  Name: Daysi Lugo    Age: 61 y.o.    Sex: female   :  1960  MRN: [id number]               Subjective:  No acute events.    Objective:    Continuous Infusions:lactated ringers, 9 mL/hr, Last Rate: Stopped (22 1040)        Scheduled Meds:clindamycin, 150 mg, Oral, TID  lisinopril, 10 mg, Oral, Daily  metFORMIN ER, 1,000 mg, Oral, BID With Meals      PRN Meds:HYDROmorphone **AND** naloxone  •  oxyCODONE-acetaminophen  •  promethazine  •  senna-docusate sodium    Vital signs in last 24 hours:  Vitals:    22 012   BP: 129/76 140/54 142/83 119/68   BP Location: Right arm Right arm Right arm Left arm   Patient Position: Lying Lying Lying Lying   Pulse: 107 112 106 102   Resp: 14 16 16 16   Temp:  97.5 °F (36.4 °C) 97.2 °F (36.2 °C) 98.1 °F (36.7 °C)   TempSrc:  Oral Oral Oral   SpO2: 96% 98% 95% 96%   Weight:       Height:           Intake/Output:I/O last 3 completed shifts:  In: 1900 [I.V.:1900]  Out: 475 [Urine:400; Blood:75]    Exam:  GEN: no acute distress, resting quietly   HEENT: NCAT, EOMI, MMM   HEART: normal rate, regular rhythm   LUNGS: respirations non-labored   ABD: soft, nondistended, nontender   EXT: moves all extremities, no edema      Recent Results (from the past 24 hour(s))   POC Glucose Once    Collection Time: 22 10:02 AM    Specimen: Blood   Result Value Ref Range    Glucose 147 (H) 70 - 130 mg/dL   POC Glucose Once    Collection Time: 22  5:29 PM    Specimen: Blood   Result Value Ref Range    Glucose 171 (H) 70 - 130 mg/dL         Assessment:    Malignant neoplasm of upper-inner quadrant of left breast in female, estrogen receptor positive (HCC)      1 Day Post-Op Procedure(s) (LRB):  Left ultrasound-guided partial mastectomy and sentinel lymph node biopsy (Left)  Right breast reduction/mastopexy, left breast oncoplastic closure, bilateral chest liposuction  (Bilateral)    Plan:  Home today    Simon Castro MD    1/18/2022

## 2022-01-18 NOTE — OP NOTE
Plastic Surgery Op Note    BREAST RECONSTRUCTION IMMEDIATE WITH RIGHT REDUCTION MASTOPEXY, LEFT ONCOPLASTIC CLOSURE WITH BREAST REDUCTION AND MASTOPEXY, BILATERAL CHEST WALL LIPOSUCTION.     Daysi Lugo  1/17/2022    Pre-op Diagnosis:   Malignant neoplasm of upper-inner quadrant of left breast in female, estrogen receptor positive (HCC) [C50.212, Z17.0]    Post-Op Diagnosis Codes:     * Malignant neoplasm of upper-inner quadrant of left breast in female, estrogen receptor positive (HCC) [C50.212, Z17.0]    Anesthesia: General    Staff:   Circulator: Berkley Medina, TORIN; Jaida Solitario, TORIN; Greta Cordoba RN  Scrub Person: Fidel Kruse; Hlaey Levin    Estimated Blood Loss: 75 mL    Specimens:   Order Name Source Comment Collection Info Order Time   TISSUE PATHOLOGY EXAM Breast, Left Fresh for permanent Collected By: Beth Delong MD 1/17/2022  1:51 PM     Release to patient   Immediate        TISSUE PATHOLOGY EXAM Breast, Right 369.0g Collected By: Simon Castro MD 1/17/2022  3:54 PM     Release to patient   Immediate              Drains:   [REMOVED] Urethral Catheter Silicone 16 Fr. (Removed)       Findings: No abnormal findings   700 cc of lipoaspirate   Complications: none       Date: 1/17/2022  Time: 23:14 EST  After risks and benefits were discussed with patient and inform consent was signed, patient was taken to the OR and positioned supine. Then patient was anesthetized using general anesthesia and the neck, chest and upper abdomen were prepped in a sterile fashion way. A time out confirming the procedure to be performed and patient's name was performed. The whole team was introduced by name.      Pre op markings were done and consisted of a 21 cm distance between the sternal notch to nipple, 4.0 cm final nipple diameter and 5.5 cm vertical line.     I  started the procedure performing a darlene areolar incision on the left  areola and de epithelizing the skin until the outside  markings. Then, Dr Delong proceeded with the partial mastectomy on the contralateral breast. Meanwhile, I proceeded with the darlene areolar incision on the right breast and de epithelization of the periareolar skin until the pre op markings. Then I resected breast tissue on the superior medial and lateral  aspect of the breast in a symmetric way as Dr Delong was performing on the other breast.   This breast tissue was resected compared and sent for pathology. Hemostasis was performed and the pocket was irrigated with betadine and a sizer was placed to determine the new implant size. The implant kept displaced on the pocket and I judged that to improve that I would need to risk nipple blood supply. I then decided to not use implants. Then I irrigated the pocket again and sutured the breast tissue to the chest wall. I performed a lateral chest wall to give her a better breast contour. The excess of skin was resected from the inferior part of the breast and sent to pathology. The areola was then repositioned and the incisions were closed with 2 layers of 3-0 vicryl. Once finished I proceeded to the contralateral side. The partial mastectomy was already done and a mirror procedure was performed.  At the end of the procedure, all instruments were checked. There were no complications and I was present for the entire procedure    Simon Castro MD  01/17/22  23:14 EST

## 2022-01-18 NOTE — OUTREACH NOTE
Prep Survey      Responses   [facility] facility patient discharged from? [facility]   Is LACE score < 7 ? Yes   Emergency Room discharge w/ pulse ox? No   Eligibility [facility]   Date of Admission 01/17/22   Date of Discharge 01/18/22   Discharge Disposition Home or Self Care   Discharge diagnosis Partial mastectomy with biopsy   Does the patient have one of the following disease processes/diagnoses(primary or secondary)? General Surgery   Does the patient have Home health ordered? No   Is there a DME ordered? No   Prep survey completed? Yes          Mery Danielle RN

## 2022-01-19 ENCOUNTER — TRANSITIONAL CARE MANAGEMENT TELEPHONE ENCOUNTER (OUTPATIENT)
Dept: CALL CENTER | Facility: HOSPITAL | Age: 62
End: 2022-01-19

## 2022-01-19 NOTE — OUTREACH NOTE
Call Center TCM Note      Responses   [facility] patient discharged from? [facility]   Does the patient have one of the following disease processes/diagnoses(primary or secondary)? General Surgery   TCM attempt successful? No  [NO verbal release]   Unsuccessful attempts Attempt 1          Trinh Mcdaniels RN    1/19/2022, 12:02 EST

## 2022-01-19 NOTE — OUTREACH NOTE
Call Center TCM Note      Responses   [facility] patient discharged from? [place]   Does the patient have one of the following disease processes/diagnoses(primary or secondary)? General Surgery   TCM attempt successful? No   Unsuccessful attempts Attempt 2          Trinh Mcdaniels RN    1/19/2022, 13:12 EST

## 2022-01-20 ENCOUNTER — TELEPHONE (OUTPATIENT)
Dept: SURGERY | Facility: CLINIC | Age: 62
End: 2022-01-20

## 2022-01-20 ENCOUNTER — TRANSITIONAL CARE MANAGEMENT TELEPHONE ENCOUNTER (OUTPATIENT)
Dept: CALL CENTER | Facility: HOSPITAL | Age: 62
End: 2022-01-20

## 2022-01-20 DIAGNOSIS — Z17.0 MALIGNANT NEOPLASM OF UPPER-INNER QUADRANT OF LEFT BREAST IN FEMALE, ESTROGEN RECEPTOR POSITIVE: Primary | ICD-10-CM

## 2022-01-20 DIAGNOSIS — C50.212 MALIGNANT NEOPLASM OF UPPER-INNER QUADRANT OF LEFT BREAST IN FEMALE, ESTROGEN RECEPTOR POSITIVE: Primary | ICD-10-CM

## 2022-01-20 NOTE — OUTREACH NOTE
Call Center TCM Note      Responses   [facility] patient discharged from? [place]   Does the patient have one of the following disease processes/diagnoses(primary or secondary)? General Surgery   TCM attempt successful? No   Unsuccessful attempts Attempt 3          Flavio Sarmiento RN    1/20/2022, 12:49 EST

## 2022-01-20 NOTE — TELEPHONE ENCOUNTER
I called Ms. Lugo to discuss her pathology report. She is recovering well from surgery. I will order an oncotype and place a referral for rad/onc.     Beth Delong MD

## 2022-01-22 ENCOUNTER — APPOINTMENT (OUTPATIENT)
Dept: DIABETES SERVICES | Facility: HOSPITAL | Age: 62
End: 2022-01-22

## 2022-01-24 ENCOUNTER — TELEPHONE (OUTPATIENT)
Dept: SURGERY | Facility: CLINIC | Age: 62
End: 2022-01-24

## 2022-01-31 ENCOUNTER — OFFICE VISIT (OUTPATIENT)
Dept: PLASTIC SURGERY | Facility: CLINIC | Age: 62
End: 2022-01-31

## 2022-01-31 DIAGNOSIS — Z09 POSTOPERATIVE FOLLOW-UP: Primary | ICD-10-CM

## 2022-01-31 PROCEDURE — 99024 POSTOP FOLLOW-UP VISIT: CPT | Performed by: SURGERY

## 2022-02-02 ENCOUNTER — OFFICE VISIT (OUTPATIENT)
Dept: SURGERY | Facility: CLINIC | Age: 62
End: 2022-02-02

## 2022-02-02 ENCOUNTER — APPOINTMENT (OUTPATIENT)
Dept: OTHER | Facility: HOSPITAL | Age: 62
End: 2022-02-02

## 2022-02-02 VITALS
DIASTOLIC BLOOD PRESSURE: 88 MMHG | OXYGEN SATURATION: 97 % | HEART RATE: 58 BPM | HEIGHT: 67 IN | RESPIRATION RATE: 17 BRPM | BODY MASS INDEX: 31.86 KG/M2 | SYSTOLIC BLOOD PRESSURE: 136 MMHG | WEIGHT: 203 LBS

## 2022-02-02 DIAGNOSIS — Z17.0 MALIGNANT NEOPLASM OF UPPER-INNER QUADRANT OF LEFT BREAST IN FEMALE, ESTROGEN RECEPTOR POSITIVE: ICD-10-CM

## 2022-02-02 DIAGNOSIS — Z48.89 POSTOPERATIVE VISIT: Primary | ICD-10-CM

## 2022-02-02 DIAGNOSIS — C50.212 MALIGNANT NEOPLASM OF UPPER-INNER QUADRANT OF LEFT BREAST IN FEMALE, ESTROGEN RECEPTOR POSITIVE: ICD-10-CM

## 2022-02-02 PROCEDURE — 99024 POSTOP FOLLOW-UP VISIT: CPT | Performed by: SURGERY

## 2022-02-02 NOTE — PROGRESS NOTES
[facility]     Referring Provider: Zarina Tineo DO     Chief complaint: Postoperative visit     HPI:   12/29/21:  Ms. Daysi Lugo is a 60 yo woman, seen at the request of Dr. Zarina Tineo, for a new diagnosis of left breast cancer. The patient first noticed a lump in her upper inner left breast in October. It has not changed in size since she first noticed it and she denies any associated pain. Her work-up is detailed in the oncologic history below. Prior to this year, it had been a least 25 years since she had a mammogram. She has a past history of a bilateral breast reduction in 2008.     She was recently diagnosed with diabetes and her PCP is trying to get her blood sugar under control. Her most recent hemoglobin A1c on 12/16/21 was 8.5. She also has a personal history of kidney cancer, sp right nephrectomy. Her maternal grandmother had breast cancer and colon cancer. She denies any family history of ovarian cancer. She saw Dr. King earlier today prior to this appointment and Dr. King sent genetic testing.      2/2/22, Interval History:  She underwent left partial mastectomy and SLNB with oncoplastic closure and right reduction for symmetry on 1/17/22. See surgery & pathology details below in oncologic history. She has been recovering well and has no complaints.        Oncology/Hematology History   Malignant neoplasm of upper-inner quadrant of left breast in female, estrogen receptor positive (HCC)   11/8/2021 Initial Diagnosis    Malignant neoplasm of upper-inner quadrant of left breast in female, estrogen receptor positive (HCC)     11/9/2021 Imaging    Bilateral Diagnostic MMG with [facility] & Left Breast US ([facility]):  MMG:  Scattered fibroglandular densities. In the posterior one-third upper inner quadrant of the left breast at the site of palpable concern corresponding to the overlying triangular skin marker, there is an irregular mass that measures on the order of 2.6 x 2.5 x 2.3 cm.  Internal coarse calcifications and area of fat necrosis are noted. I see no other dominant masses in either breast. No areas of architectural distortion are appreciated. There is no evidence for skin thickening, nipple retraction or axillary adenopathy.  US:  Targeted sonographic evaluation of the left breast was performed through the area of palpable concern which corresponds to the 10 o'clock position on the order of 5 cm from the nipple. At this location, there is an irregular hypoechoic mass with internal calcifications. The mass measures 2.4 x 1.9 x 1.4 cm. No detectable internal vascularity is appreciated. Posterior acoustic shadowing is noted.  BI-RADS 4: Suspicious.     12/2/2021 Biopsy    Left Breast, US-Guided Biopsy ( [facility]):    1. Left Breast, 10 O'clock, 5 cm from Nipple, Ultrasound-Guided Biopsies for a Mass: INVASIVE MAMMARY                CARCINOMA, NO SPECIAL TYPE (INVASIVE DUCTAL CARCINOMA).               A. Histologic grade: [place] histologic score II (tubules = 3, nuclei = 2, mitosis = 1).               B. Largest contiguous invasive focus measures 10 mm in a core.               C. No definitive in situ component identified.                       D. No lymphovascular nor perineural invasion identified.    ER+ (%, strong)  MO+ (%, strong)  HER2 negative (IHC 1+)  Ki-67 8%     12/23/2021 Imaging    Bilateral Breast MRI ( [facility]):  Within the posterior one-third upper inner quadrant of the left breast centered at the 10 o'clock position on the order of 8 cm from nipple there is an irregular enhancing mass that measures on the order of 2.5 cm in anterior to posterior dimension, 2.8 cm in the mediolateral dimension and 2.1 cm in the superior-inferior dimension. A signal void is seen within the mass that corresponds to a bowtie-shaped metallic clip. This represents the biopsy-proven site of malignancy.   No other areas of abnormal enhancement or morphology are seen within the left  breast. I see no evidence for abnormal left breast skin, nipple or chest wall enhancement.  There is no evidence for left axillary or internal mammary chain adenopathy.    There are no areas of abnormal enhancement or morphology in the right breast. I see no evidence for abnormal right breast skin, nipple or chest wall enhancement and there is no evidence for right axillary or internal mammary chain adenopathy.  BI-RADS 6: Known malignancy.     12/29/2021 Genetic Testing    [employer] Common Hereditary Cancers Panel (47 genes):    Negative     1/17/2022 Surgery    Left ultrasound-guided partial mastectomy and sentinel lymph node biopsy with oncoplastic closure and right reduction for symmetry    1.  Breast, Left, Lumpectomy (88 grams):                A.  Invasive mammary carcinoma, no special type (ductal), [place] histologic grade II        (tubules = 3, nuclei = 2, mitoses = 1), measuring 30 mm maximally with associated         perineural invasion and microcalcifications identified adjacent to bow tie clip and biopsy site change.   B.  Focal  minimal associated atypical ductal hyperplasia; no definitive in situ carcinoma is identified.   C.  All margins are free of tumor (closest margin inferior at 2.8 mm).  D.  Incidental fibroadenoma 2.6 mm maximally.    E.  Focal fat necrosis.  F.  See synoptic template for complete details and final margin measurements.      2.  Breast, Left, Additional, Medial, Inferior and Lateral Margins, Inked and Oriented Excision:   A.  Benign breast parenchyma with focal organizing fat necrosis; the final medial inferior and lateral margins are free of tumor by an additional 10 mm.      3.  Breast, Left Additional Posterior Margin, Inked and Oriented Excision:                A.  Benign fibroadipose tissue and skeletal muscle; the final posterior margin is free of tumor and         by an additional 3 mm.      4.  Lymph Node, Left Axillary, Ono #1, Excision:                 A.   Benign lymph node (0/1).     5.  Breast, Right, Reduction Mammoplasty (369 grams):                 A.  Benign skin, subcutaneous tissue and breast parenchyma.      6.  Breast, Left, Additional Anterior Margin, Excision:                A.  Benign skin and subcutaneous tissue (tumor measures an estimated 20 mm from the overlying skin surface).     7.  Breast, Left, Reduction Mammoplasty (225 grams):                A.  Benign skin, subcutaneous and breast parenchyma.       Oncotype DX: Pending         Review of Systems:  See interval history.       Medications:    Current Outpatient Medications:   •  Blood Glucose Monitoring Suppl (OneTouch Verio Reflect) w/Device kit, See Admin Instructions., Disp: , Rfl:   •  clindamycin (Cleocin) 150 MG capsule, Take 1 capsule by mouth 3 (Three) Times a Day. (Patient taking differently: Take 150 mg by mouth 3 (Three) Times a Day. TO START AFTER SURGERY), Disp: 30 capsule, Rfl: 0  •  glucose blood test strip, 1 each by Other route Daily., Disp: 25 each, Rfl: 2  •  Lancets (freestyle) lancets, 1 each by Other route Daily., Disp: 100 each, Rfl: 4  •  lisinopril (PRINIVIL,ZESTRIL) 10 MG tablet, Take 1 tablet by mouth Daily., Disp: 30 tablet, Rfl: 0  •  metFORMIN ER (GLUCOPHAGE-XR) 500 MG 24 hr tablet, Take 2 tablets by mouth 2 (Two) Times a Day With Meals., Disp: 360 tablet, Rfl: 1      Allergies   Allergen Reactions   • Hydrocodone Anaphylaxis     PER PATIENT   • Penicillins Itching       Family History   Problem Relation Age of Onset   • Diabetes type II Mother    • Hypertension Mother    • Thyroid cancer Mother    • Breast cancer Maternal Grandmother    • Colon cancer Maternal Grandmother    • Alzheimer's disease Maternal Grandfather    • Malig Hyperthermia Neg Hx        PHYSICAL EXAMINATION:   Vitals:    02/02/22 1355   BP: 136/88   Pulse: 58   Resp: 17   SpO2: 97%     ECOG 0 - Asymptomatic  General: NAD, well appearing  Psych: a&o x3, normal mood and affect  Eyes: EOMI, no scleral  icterus  ENMT: neck supple without masses or thyromegaly, mucous membranes moist  MSK: normal gait, normal ROM in bilateral shoulders  Lymph nodes: Well-healing left axillary incision with Dermabond intact.  Breast:   Right: Well-healing mastopexy incisions. Flaps and NAC healthy.  Left: Well-healing mastopexy incisions. Flaps and NAC healthy.      Assessment:  62 y.o. F with a diagnosis of left breast cancer: Intermediate grade, invasive ductal carcinoma, ER/IA+, Her2 negative. She underwent left partial mastectomy and SLNB with oncoplastic closure and right reduction for symmetry on 1/17/22, pT2N0, anatomic stage IIA, prognostic stage IA.      Plan:  -Radiation oncology referral. Appointment scheduled with Dr. Ann on 2/7/22.  -PT/LE clinic evaluation in 2 weeks.  -Continue follow-up with Dr. King.  -Continue follow-up with Dr. Castro.  -F/u in 3 months for clinical exam with NP.  -She was instructed to call sooner with any questions, concerns or changes on BSE.    Beth Delong MD      CC:  Zarina Tineo DO

## 2022-02-07 ENCOUNTER — APPOINTMENT (OUTPATIENT)
Dept: RADIATION ONCOLOGY | Facility: HOSPITAL | Age: 62
End: 2022-02-07

## 2022-02-07 ENCOUNTER — CONSULT (OUTPATIENT)
Dept: RADIATION ONCOLOGY | Facility: HOSPITAL | Age: 62
End: 2022-02-07

## 2022-02-07 ENCOUNTER — OFFICE VISIT (OUTPATIENT)
Dept: FAMILY MEDICINE CLINIC | Facility: CLINIC | Age: 62
End: 2022-02-07

## 2022-02-07 VITALS — DIASTOLIC BLOOD PRESSURE: 93 MMHG | HEART RATE: 74 BPM | OXYGEN SATURATION: 97 % | SYSTOLIC BLOOD PRESSURE: 155 MMHG

## 2022-02-07 VITALS
DIASTOLIC BLOOD PRESSURE: 88 MMHG | SYSTOLIC BLOOD PRESSURE: 154 MMHG | WEIGHT: 200 LBS | BODY MASS INDEX: 31.39 KG/M2 | TEMPERATURE: 97.3 F | HEART RATE: 91 BPM | HEIGHT: 67 IN | OXYGEN SATURATION: 96 %

## 2022-02-07 DIAGNOSIS — Z17.0 MALIGNANT NEOPLASM OF UPPER-INNER QUADRANT OF LEFT BREAST IN FEMALE, ESTROGEN RECEPTOR POSITIVE: Primary | ICD-10-CM

## 2022-02-07 DIAGNOSIS — I10 PRIMARY HYPERTENSION: Primary | ICD-10-CM

## 2022-02-07 DIAGNOSIS — C50.212 MALIGNANT NEOPLASM OF UPPER-INNER QUADRANT OF LEFT BREAST IN FEMALE, ESTROGEN RECEPTOR POSITIVE: Primary | ICD-10-CM

## 2022-02-07 PROCEDURE — G0463 HOSPITAL OUTPT CLINIC VISIT: HCPCS | Performed by: RADIOLOGY

## 2022-02-07 PROCEDURE — 99205 OFFICE O/P NEW HI 60 MIN: CPT | Performed by: RADIOLOGY

## 2022-02-07 PROCEDURE — 99214 OFFICE O/P EST MOD 30 MIN: CPT | Performed by: FAMILY MEDICINE

## 2022-02-07 RX ORDER — LISINOPRIL 20 MG/1
20 TABLET ORAL DAILY
Qty: 30 TABLET | Refills: 0 | Status: SHIPPED | OUTPATIENT
Start: 2022-02-07 | End: 2022-02-23 | Stop reason: SDUPTHER

## 2022-02-07 NOTE — PROGRESS NOTES
Subjective     Beth Delong MD    Cancer Staging  No matching staging information was found for the patient.    CC: left breast cancer pT2N0 ER UT Pos Her 2 neg                                Dear Beth Delong MD    I had the pleasure of seeing Daysi Lugo  today in the [facility].   The patient is a 62 y.o. female with recently diagnosed left breast cancer.  She presented with a palpable mass in the left breast in 2021.  She had a bilateral diagnostic mammogram and left breast ultrasound on 21 which revealed an irregular mass left breast 10 oclock corresponding to area of palpable concern measuring 2.4cm, birads 4.  She had an ultrasound guided biopsy on 21 which revealed invasive ductal carcinoma, grade II, 10mm in size, ER % UT % Her 2 negative and ki67 8%.      She had a breast mri on 21 which showed the biopsy proven malignancy left breast 10 oclock posterior one third measuring 2.8cm with central clip and no axillary or internal mammary lymphadenopathy and no suspicious findings in the right breast.     She underwent a left breast partial mastectomy and sentinel node biopsy on 22 with Dr. Delong with pathology revealing invasive ductal carcinoma, grade II, 30mm in size with perineural invasion.  All margins were negative with the closest inferior margin 2.8mm.  Additional margins were all negative with the final closest inferior margin 12.8mm.  One sentinel lymph node was negative.  Her pathologic stage was pT2N0.  oncotype Dx has been ordered and results are still pending.     She has a follow up with Dr. King with medical oncology in the next few days to review the oncoytpe test results and get final recomemndations.     She is  with menarche age 13.  She had a hysterectomy early 30s and has never taken hormone replacement therapy. She has a hx of maternal grandmother with breast cancer as well as maternal great grandmother.  Her  mother also had thyroid cancer.  The patient has a personal hx of renal cell carcinoma at age 31 at which time she underwent a left nephrectomy.  She had [employer] genetic testing which was negative        Review of Systems   Constitutional: Negative.    HENT: Negative.    Respiratory: Negative.    Cardiovascular: Negative.    Gastrointestinal: Negative.    Genitourinary: Negative.    Musculoskeletal: Negative.    Skin: Negative.    Neurological: Negative.    Psychiatric/Behavioral: Negative.          Past Medical History:   Diagnosis Date   • Anesthesia complication     SLOW TO WAKE UP   • Arthritis    • Breast cancer (HCC) 2021    Invasive ductal carcinoma of left breast    • History of kidney cancer     S/P right nephrectomy in 1991   • Hypertension    • Tumor, thyroid    • Type 2 diabetes mellitus (HCC)          Past Surgical History:   Procedure Laterality Date   • BACK SURGERY  1990   • BREAST BIOPSY Right    • BREAST LUMPECTOMY WITH SENTINEL NODE BIOPSY Left 1/17/2022    Procedure: Left ultrasound-guided partial mastectomy and sentinel lymph node biopsy;  Surgeon: Beth Delong MD;  Location: [facility];  Service: General;  Laterality: Left;   • BREAST RECONSTRUCTION Bilateral 1/17/2022    Procedure: Right breast reduction/mastopexy, left breast oncoplastic closure, bilateral chest liposuction;  Surgeon: Simon Castro MD;  Location: [facility];  Service: Plastics;  Laterality: Bilateral;   • COLONOSCOPY     • HYSTERECTOMY  1992   • KNEE SURGERY Right 2001    RECONSTRUCTION   • KNEE SURGERY Left 2003    RECONSTRUCTION   • NEPHRECTOMY Right 1991   • REDUCTION MAMMAPLASTY  2008   • THYROID BIOPSY Bilateral 2010         Social History     Socioeconomic History   • Marital status:      Spouse name: Lucas   • Number of children: 0   • Highest education level: Bachelor's degree (e.g., BA, AB, BS)   Tobacco Use   • Smoking status: Former Smoker     Packs/day: 0.00     Years: 0.00     Pack  years: 0.00     Types: Cigarettes     Quit date:      Years since quittin.1   • Smokeless tobacco: Never Used   Vaping Use   • Vaping Use: Never used   Substance and Sexual Activity   • Alcohol use: Yes     Alcohol/week: 0.0 standard drinks     Comment: rarely   • Drug use: Never   • Sexual activity: Defer         Family History   Problem Relation Age of Onset   • Diabetes type II Mother    • Hypertension Mother    • Thyroid cancer Mother    • Breast cancer Maternal Grandmother    • Colon cancer Maternal Grandmother    • Alzheimer's disease Maternal Grandfather    • Malig Hyperthermia Neg Hx           Objective    Physical Exam  Constitutional:       Appearance: Normal appearance.   HENT:      Head: Atraumatic.   Eyes:      Extraocular Movements: Extraocular movements intact.   Pulmonary:      Effort: Pulmonary effort is normal.   Chest:          Comments: bilalateral mastopexy incisions healing, very small 2-3 mm open area right mid inframmary incision, no erythema or drainage, no palpable masses in either breast or axilla  Musculoskeletal:         General: Normal range of motion.   Neurological:      General: No focal deficit present.      Mental Status: She is alert and oriented to person, place, and time.   Psychiatric:         Mood and Affect: Mood normal.         Thought Content: Thought content normal.           Current Outpatient Medications on File Prior to Visit   Medication Sig Dispense Refill   • Blood Glucose Monitoring Suppl (OneTouch Verio Reflect) w/Device kit See Admin Instructions.     • clindamycin (Cleocin) 150 MG capsule Take 1 capsule by mouth 3 (Three) Times a Day. (Patient taking differently: Take 150 mg by mouth 3 (Three) Times a Day. TO START AFTER SURGERY) 30 capsule 0   • glucose blood test strip 1 each by Other route Daily. 25 each 2   • Lancets (freestyle) lancets 1 each by Other route Daily. 100 each 4   • lisinopril (PRINIVIL,ZESTRIL) 10 MG tablet Take 1 tablet by mouth  Daily. 30 tablet 0   • metFORMIN ER (GLUCOPHAGE-XR) 500 MG 24 hr tablet Take 2 tablets by mouth 2 (Two) Times a Day With Meals. 360 tablet 1     No current facility-administered medications on file prior to visit.       ALLERGIES:    Allergies   Allergen Reactions   • Hydrocodone Anaphylaxis     PER PATIENT   • Penicillins Itching       LMP  (LMP Unknown)      Current Status 12/29/2021   ECOG score 0         Assessment/Plan     62 year old female with pT2N0 invasive ductal carcinoma of left breast ER NM Pos Her 2 negative. I discussed with her my recommendation for post-operative radiation therapy to the left breast to decrease the risk of local recurrence.      I discussed with her in detail the risks, benefits and rationale of radiation therapy to the left breast to include but not limited to the following:    Acute: skin erythema, breakdown/moist desquamation, swelling or discomfort of the breast, fatigue, pneumonitis resulting in shortness of breath, cough or pain    Late: Permanent skin changes including hyperpigmentation, telangiectasias, fibrosis of the breast resulting in smaller size or poor cosmetic outcome, late edema or cellulitis, late rib fracture, late pulmonary fibrosis and the remote risk of late cardiac damage resulting in increased risk of heart attack or second malignancies.      She voiced understanding and was given an opportunity to ask questions which I believe were answered to her satisfaction. I have scheduled her to return in 3 weeks for CT simulation for treatment planning. I plan to treat the left breast with tangential fields to a dose of approximately 3990cGy in 15 fractions followed by a boost to the tumor bed for an additional 1000cGy in 5 fractions.  If her oncotype score returns high and chemotherapy is recommended, she knows to cancel this appointment and I will see her back once she completes chemotherapy.     I personally spent greater than 60 minutes today assessing, managing,  discussing and documenting my visit with the patient. That time includes review of records, imaging and pathology reports, obtaining my own history, performing a medically appropriate evaluation, counseling and educating the patient, discussing goals, logistics, alternatives and risks of my recommendations, surveillance options and potential outcomes. It also includes the time documenting the clinical information in the EMR and communicating my recommendations to the other involved physicians.               Thank you very much for allowing me to participate in the care of this very pleasant patient.    Sincerely,      Lila Ann MD

## 2022-02-07 NOTE — PROGRESS NOTES
"Chief Complaint  Hypertension    Subjective          Daysi Lugo presents to [facility]  Patient is here today to follow-up on hypertension. Patient has had a right nephrectomy for kidney cancer in 1991.  She is on lisinopril 10 mg daily.  She denies any side effects and has been faithfully taking the medication every day.  Her blood pressures are still elevated.  Patient denies any chest pain or shortness of breath or headaches.  The patient has been through a very stressful time with the new diagnosis of breast cancer and recent surgery.      Objective   Vital Signs:   /88   Pulse 91   Temp 97.3 °F (36.3 °C)   Ht 170.2 cm (67\")   Wt 90.7 kg (200 lb)   SpO2 96%   BMI 31.32 kg/m²     Physical Exam  Vitals and nursing note reviewed.   Constitutional:       Appearance: Normal appearance. She is well-developed. She is obese.   HENT:      Head: Normocephalic and atraumatic.   Eyes:      General: No scleral icterus.     Conjunctiva/sclera: Conjunctivae normal.   Cardiovascular:      Rate and Rhythm: Normal rate and regular rhythm.      Heart sounds: Normal heart sounds.   Pulmonary:      Effort: Pulmonary effort is normal.      Breath sounds: Normal breath sounds.   Musculoskeletal:         General: Normal range of motion.      Cervical back: Normal range of motion and neck supple.      Right lower leg: No edema.      Left lower leg: No edema.   Skin:     General: Skin is warm and dry.      Capillary Refill: Capillary refill takes less than 2 seconds.      Findings: No rash.   Neurological:      Mental Status: She is alert and oriented to person, place, and time.   Psychiatric:         Mood and Affect: Mood normal.         Behavior: Behavior normal.         Thought Content: Thought content normal.         Judgment: Judgment normal.        Result Review :   The following data was reviewed by: Zarina Tineo DO on 02/07/2022:  Common labs    Common Labsle 12/23/21 12/29/21 " 1/12/22 1/12/22 1/12/22      1054 1342 1342   Glucose   132 (A)  190 (A)   BUN   14  14   Creatinine 1.20  0.79  0.89   eGFR Non  Am   82  65   eGFR African Am   95     Sodium   145 (A)  143   Potassium   4.4  3.8   Chloride   105  104   Calcium   9.6  8.9   Total Protein   6.4     Albumin   4.5     Total Bilirubin   0.3     Alkaline Phosphatase   123 (A)     AST (SGOT)   15     ALT (SGPT)   21     WBC  6.86  6.49    Hemoglobin  14.2  13.3    Hematocrit  41.3  39.8    Platelets  206  208    (A) Abnormal value       Comments are available for some flowsheets but are not being displayed.           TSH    TSH 9/23/21   TSH 2.260                     Assessment and Plan    Diagnoses and all orders for this visit:    1. Primary hypertension (Primary)  -     lisinopril (PRINIVIL,ZESTRIL) 20 MG tablet; Take 1 tablet by mouth Daily.  Dispense: 30 tablet; Refill: 0  -     Comprehensive Metabolic Panel    Patient is here today for uncontrolled new onset hypertension.  If her labs are okay I will increase the lisinopril to 20 mg daily.  Follow-up based on results.  With the patient's history of having only 1 kidney,  the maximum of lisinopril I will use is 20 mg daily.  If better blood pressure control is not attained with that dosage, I will consider using amlodipine in addition.        Follow Up   Return in about 16 days (around 2/23/2022) for HTN.  Patient was given instructions and counseling regarding her condition or for health maintenance advice. Please see specific information pulled into the AVS if appropriate.

## 2022-02-08 ENCOUNTER — TRANSCRIBE ORDERS (OUTPATIENT)
Dept: SURGERY | Facility: CLINIC | Age: 62
End: 2022-02-08

## 2022-02-08 DIAGNOSIS — Z17.0 MALIGNANT NEOPLASM OF UPPER-INNER QUADRANT OF LEFT BREAST IN FEMALE, ESTROGEN RECEPTOR POSITIVE: Primary | ICD-10-CM

## 2022-02-08 DIAGNOSIS — C50.212 MALIGNANT NEOPLASM OF UPPER-INNER QUADRANT OF LEFT BREAST IN FEMALE, ESTROGEN RECEPTOR POSITIVE: Primary | ICD-10-CM

## 2022-02-08 LAB
ALBUMIN SERPL-MCNC: 4.4 G/DL (ref 3.8–4.9)
ALBUMIN/GLOB SERPL: 1.9 {RATIO} (ref 1.2–2.2)
ALP SERPL-CCNC: 129 IU/L (ref 44–121)
ALT SERPL-CCNC: 20 IU/L (ref 0–32)
AST SERPL-CCNC: 14 IU/L (ref 0–40)
BILIRUB SERPL-MCNC: 0.3 MG/DL (ref 0–1.2)
BUN SERPL-MCNC: 18 MG/DL (ref 8–27)
BUN/CREAT SERPL: 17 (ref 12–28)
CALCIUM SERPL-MCNC: 10 MG/DL (ref 8.7–10.3)
CHLORIDE SERPL-SCNC: 101 MMOL/L (ref 96–106)
CO2 SERPL-SCNC: 25 MMOL/L (ref 20–29)
CREAT SERPL-MCNC: 1.04 MG/DL (ref 0.57–1)
GLOBULIN SER CALC-MCNC: 2.3 G/DL (ref 1.5–4.5)
GLUCOSE SERPL-MCNC: 139 MG/DL (ref 65–99)
POTASSIUM SERPL-SCNC: 4.4 MMOL/L (ref 3.5–5.2)
PROT SERPL-MCNC: 6.7 G/DL (ref 6–8.5)
SODIUM SERPL-SCNC: 142 MMOL/L (ref 134–144)

## 2022-02-09 ENCOUNTER — LAB (OUTPATIENT)
Dept: OTHER | Facility: HOSPITAL | Age: 62
End: 2022-02-09

## 2022-02-09 ENCOUNTER — OFFICE VISIT (OUTPATIENT)
Dept: ONCOLOGY | Facility: CLINIC | Age: 62
End: 2022-02-09

## 2022-02-09 VITALS
OXYGEN SATURATION: 95 % | HEART RATE: 74 BPM | TEMPERATURE: 97.5 F | HEIGHT: 67 IN | BODY MASS INDEX: 30.83 KG/M2 | DIASTOLIC BLOOD PRESSURE: 85 MMHG | RESPIRATION RATE: 16 BRPM | WEIGHT: 196.4 LBS | SYSTOLIC BLOOD PRESSURE: 147 MMHG

## 2022-02-09 DIAGNOSIS — Z80.9 FAMILY HISTORY OF CANCER: ICD-10-CM

## 2022-02-09 DIAGNOSIS — Z17.0 MALIGNANT NEOPLASM OF UPPER-INNER QUADRANT OF LEFT BREAST IN FEMALE, ESTROGEN RECEPTOR POSITIVE: ICD-10-CM

## 2022-02-09 DIAGNOSIS — C50.212 MALIGNANT NEOPLASM OF UPPER-INNER QUADRANT OF LEFT BREAST IN FEMALE, ESTROGEN RECEPTOR POSITIVE: Primary | ICD-10-CM

## 2022-02-09 DIAGNOSIS — Z17.0 MALIGNANT NEOPLASM OF UPPER-INNER QUADRANT OF LEFT BREAST IN FEMALE, ESTROGEN RECEPTOR POSITIVE: Primary | ICD-10-CM

## 2022-02-09 DIAGNOSIS — C50.212 MALIGNANT NEOPLASM OF UPPER-INNER QUADRANT OF LEFT BREAST IN FEMALE, ESTROGEN RECEPTOR POSITIVE: ICD-10-CM

## 2022-02-09 LAB
ALBUMIN SERPL-MCNC: 4.5 G/DL (ref 3.5–5.2)
ALBUMIN/GLOB SERPL: 1.6 G/DL
ALP SERPL-CCNC: 123 U/L (ref 39–117)
ALT SERPL W P-5'-P-CCNC: 21 U/L (ref 1–33)
ANION GAP SERPL CALCULATED.3IONS-SCNC: 7.2 MMOL/L (ref 5–15)
AST SERPL-CCNC: 15 U/L (ref 1–32)
BASOPHILS # BLD AUTO: 0.03 10*3/MM3 (ref 0–0.2)
BASOPHILS NFR BLD AUTO: 0.4 % (ref 0–1.5)
BILIRUB SERPL-MCNC: 0.5 MG/DL (ref 0–1.2)
BUN SERPL-MCNC: 23 MG/DL (ref 8–23)
BUN/CREAT SERPL: 26.1 (ref 7–25)
CALCIUM SPEC-SCNC: 9.9 MG/DL (ref 8.6–10.5)
CHLORIDE SERPL-SCNC: 102 MMOL/L (ref 98–107)
CO2 SERPL-SCNC: 29.8 MMOL/L (ref 22–29)
CREAT SERPL-MCNC: 0.88 MG/DL (ref 0.57–1)
DEPRECATED RDW RBC AUTO: 44.1 FL (ref 37–54)
EOSINOPHIL # BLD AUTO: 0.33 10*3/MM3 (ref 0–0.4)
EOSINOPHIL NFR BLD AUTO: 4.5 % (ref 0.3–6.2)
ERYTHROCYTE [DISTWIDTH] IN BLOOD BY AUTOMATED COUNT: 13.2 % (ref 12.3–15.4)
GFR SERPL CREATININE-BSD FRML MDRD: 66 ML/MIN/1.73
GLOBULIN UR ELPH-MCNC: 2.9 GM/DL
GLUCOSE SERPL-MCNC: 170 MG/DL (ref 65–99)
HCT VFR BLD AUTO: 43.3 % (ref 34–46.6)
HGB BLD-MCNC: 14 G/DL (ref 12–15.9)
IMM GRANULOCYTES # BLD AUTO: 0.03 10*3/MM3 (ref 0–0.05)
IMM GRANULOCYTES NFR BLD AUTO: 0.4 % (ref 0–0.5)
LYMPHOCYTES # BLD AUTO: 2 10*3/MM3 (ref 0.7–3.1)
LYMPHOCYTES NFR BLD AUTO: 27.3 % (ref 19.6–45.3)
MCH RBC QN AUTO: 29.5 PG (ref 26.6–33)
MCHC RBC AUTO-ENTMCNC: 32.3 G/DL (ref 31.5–35.7)
MCV RBC AUTO: 91.4 FL (ref 79–97)
MONOCYTES # BLD AUTO: 0.36 10*3/MM3 (ref 0.1–0.9)
MONOCYTES NFR BLD AUTO: 4.9 % (ref 5–12)
NEUTROPHILS NFR BLD AUTO: 4.58 10*3/MM3 (ref 1.7–7)
NEUTROPHILS NFR BLD AUTO: 62.5 % (ref 42.7–76)
NRBC BLD AUTO-RTO: 0 /100 WBC (ref 0–0.2)
PLATELET # BLD AUTO: 297 10*3/MM3 (ref 140–450)
PMV BLD AUTO: 10.4 FL (ref 6–12)
POTASSIUM SERPL-SCNC: 4.4 MMOL/L (ref 3.5–5.2)
PROT SERPL-MCNC: 7.4 G/DL (ref 6–8.5)
RBC # BLD AUTO: 4.74 10*6/MM3 (ref 3.77–5.28)
SODIUM SERPL-SCNC: 139 MMOL/L (ref 136–145)
WBC NRBC COR # BLD: 7.33 10*3/MM3 (ref 3.4–10.8)

## 2022-02-09 PROCEDURE — 36415 COLL VENOUS BLD VENIPUNCTURE: CPT

## 2022-02-09 PROCEDURE — 99215 OFFICE O/P EST HI 40 MIN: CPT | Performed by: INTERNAL MEDICINE

## 2022-02-09 PROCEDURE — 80053 COMPREHEN METABOLIC PANEL: CPT | Performed by: INTERNAL MEDICINE

## 2022-02-09 PROCEDURE — 85025 COMPLETE CBC W/AUTO DIFF WBC: CPT | Performed by: INTERNAL MEDICINE

## 2022-02-09 NOTE — PROGRESS NOTES
Subjective     REASON FOR follow-up:     1.  PT2N0, anatomic stage IIa, prognostic stage Ia invasive ductal carcinoma of the left breast, intermediate grade ER positive, [place] positive HER-2/juan negative s/p left partial mastectomy with sentinel lymph node biopsy with oncoplastic closure and right reduction for symmetry on January 17, 2022.        History of Present Illness   Patient is a 62-year-old female with recently diagnosed stage IIa left breast cancer ER/[place] positive HER-2/juan negative.  She underwent surgery as of January 17, 2022.  She is healing up from surgery currently.  Oncotype DX testing was done but the results are pending.  I discussed with her that if she falls in the low risk or intermediate risk category she may not require chemotherapy but if she falls in the high risk category she will require chemotherapy.  Apparently there is a insurance delay in getting the Oncotype DX.    Patient is just 3 weeks out of surgery.  I reviewed the pathology in length with the patient        Oncologic history:  patient is a 61-year-old female who is a  for [employer]. She recently back in September October 2021 noticed a mass in the left breast upper inner quadrant. She was playing with a cat and noticed some pain in the breast at which time she felt the breast and felt this mass. She has had history of nipple discharge on and off for years in the left breast but that has been very minimal. There is no bloody discharge. She has not had mammogram for years, at least 25 years according to her. So when she felt the mass she went to her primary care physician Dr. Zarina Tineo who ordered a diagnostic mammogram and found in abnormality in the left breast at 10 o'clock position which was 2.6 x 2.5 x 2.3 cm. Subsequently she underwent a biopsy which was consistent with invasive ductal carcinoma ER/W[place] positive HER-2/juan negative.    She does have a family history of malignancy with her  grandmother having: And breast cancer on her maternal side. Her maternal great-grandmother had breast cancer. Her mother had thyroid cancer. Patient has had renal cell cancer at age 31 and had to undergo left nephrectomy. She also had a thyroid nodule for which she was seen at [place] by physician. He had placed her on Synthroid and she had a very severe allergic reaction to Synthroid with swelling and he discontinued that. Patient was to be followed in a years time and has not gone.. But she has not had any issues with that    More recently she lost her  3 weeks ago as he was dealing with colon cancer. He was 70 years of age. He did not want any treatment. She had to take care of of him for the last 16 months and if no her symptoms. 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    Details are as follows    November 10, 2021: Bilateral diagnostic mammogram and ultrasound  FINDINGS: Bilateral digital CC and MLO mammographic and Tomosynthesis  images were obtained. No prior examination is available for comparison.  Scattered fibroglandular densities are seen throughout both breasts. In  the posterior one-third upper inner quadrant of the left breast at the  site of palpable concern corresponding to the overlying triangular skin  marker, there is an irregular mass that measures on the order of 2.6 x  2.5 x 2.3 cm. Internal coarse calcifications and areas of fat necrosis  are noted. I see no other dominant masses in either breast. No areas of  architectural distortion are appreciated. There is no evidence for skin  thickening, nipple retraction or axillary adenopathy.     ULTRASOUND: Targeted sonographic evaluation of the left breast was  performed through the area of palpable concern which corresponds to the  10-o'clock position on the order of 5 cm from the nipple. At this  location, there is an irregular hypoechoic mass with  internal  calcifications. The mass measures 2.4 x 1.9 x 1.4 cm. No detectable  internal vascularity is appreciated. Posterior acoustic shadowing is  noted.     IMPRESSION:  1. There is an irregular mass in the left breast at the 10-o'clock  position corresponding to the site of palpable concern. Sonographically,  the mass measures on the order of 2.4 cm in greatest dimension.  Correlation with an ultrasound-guided left breast biopsy is recommended.  2. There are no findings suspicious for malignancy in the right breast.     November 9, 2021: Ultrasound-guided left breast biopsy showed    Final Diagnosis   1. Left Breast, 10 O'clock, 5 cm from Nipple, Ultrasound-Guided Biopsies for a Mass: INVASIVE MAMMARY                CARCINOMA, NO SPECIAL TYPE (INVASIVE DUCTAL CARCINOMA).               A. Histologic grade: Eldon histologic score II (tubules = 3, nuclei = 2, mitosis = 1).               B. Largest contiguous invasive focus measures 10 mm in a core.               C. No definitive in situ component identified.                       D. No lymphovascular nor perineural invasion identified.     Estrogen receptor: %  Progesterone receptor: %  HER-2/juan: 1+, negative  Ki-67: 8%    December 28, 2021: MRI of the breast    FINDINGS:Scattered fibroglandular tissue is seen throughout both  breasts. Minimal background parenchymal enhancement of both breasts is  noted.     Within the posterior one-third upper inner quadrant of the left breast  centered at the 10 o'clock position on the order of 8 cm from nipple  there is an irregular enhancing mass that measures on the order of 2.5  cm in anterior to posterior dimension, 2.8 cm in the mediolateral  dimension and 2.1 cm in the superior-inferior dimension. A signal void  is seen within the mass that corresponds to a bowtie-shaped metallic  clip. This represents the biopsy-proven site of malignancy.     No other areas of abnormal enhancement or morphology are seen  within the  left breast. I see no evidence for abnormal left breast skin, nipple or  chest wall enhancement.  There is no evidence for left axillary or  internal mammary chain adenopathy.     There are no areas of abnormal enhancement or morphology in the right  breast. I see no evidence for abnormal right breast skin, nipple or  chest wall enhancement and there is no evidence for right axillary or  internal mammary chain adenopathy.     IMPRESSION:  1. Biopsy-proven malignancy in the left breast at the 10 o'clock  position in the posterior one-third that measures on the order of 2.8 cm  in greatest dimension with a centrally located bowtie shaped metallic  clip. No other suspicious findings are seen within the left breast and  there is no evidence for left axillary or internal mammary chain  adenopathy.  2. There are no findings suspicious for malignancy in the right breast.     BI-RADS category 6: Known malignancy.     This report was finalized on 12/28/2021 7:24 AM by Dr. Sixto Ramos M.D.     Patient has been scheduled for further recommendations. She has appointment with Dr. Beth Delong at 11 AM today.    January 17, 2022: Patient is s/p surgery left partial mastectomy with sentinel lymph node biopsy      Synoptic Checklist  INVASIVE CARCINOMA OF THE BREAST: Resection (INVASIVE CARCINOMA OF THE BREAST: COMPLETE EXCISION - All Specimens)  Specimen Laterality Left  .  TUMOR  Tumor Site Upper outer quadrant  Clock position  10 o'clock  Tumor Site Distance from nipple (Centimeters): 5 cm  Histologic Type Invasive carcinoma of no special type (ductal)  Histologic Grade ([place] Histologic Score)  Glandular (Acinar) / Tubular Differentiation Score 3  Nuclear Pleomorphism Score 2  Mitotic Rate Score 1  Overall Grade Grade 2 (scores of 6 or 7)  Tumor Size Greatest dimension of largest invasive focus (Millimeters): 30 mm  Tumor Focality Single focus of invasive carcinoma  Ductal Carcinoma In Situ (DCIS) Not  identified  Lobular Carcinoma In Situ (LCIS) Not identified  Tumor Extent  Lymphovascular Invasion Not identified  Dermal Lymphovascular Invasion Not identified  Microcalcifications Present in invasive carcinoma  Present in non-neoplastic tissue  Treatment Effect in the Breast No known presurgical therapy  .  MARGINS  Margin Status for Invasive Carcinoma All margins negative for invasive carcinoma  Distance from Invasive Carcinoma to Closest Margin 12.8 mm  Closest Margin(s) to Invasive Carcinoma Inferior  Distance from Invasive Carcinoma to Posterior Margin 23 mm  Distance from Invasive Carcinoma to Superior Margin 13 mm  Distance from Invasive Carcinoma to Medial Margin  Comment  The patient's corresponding left breast core biopsy material is pulled and reviewed for comparison ([id number]). The  tumors appear similar; no discordance is noted.  Selected slides from this case are shared in internal consultation with Bam Campbell and  who concur.  [facility]/kds  .  REGIONAL LYMPH NODES  Regional Lymph Node Status All regional lymph nodes negative for tumor  Total Number of Lymph Nodes Examined (sentinel and non-sentinel) 1  Number of [place] Nodes Examined 1  .  PATHOLOGIC STAGE CLASSIFICATION (pTNM, AJCC 8th Edition)    pT Category pT2  Regional Lymph Nodes Modifier (sn): [place] node(s) evaluated.  pN Category pN0    ER %  HI %  HER-2/juan 1±  Ki-67 8%    Patient is here to discuss further options of treatment.  Oncotype DX pending    Past Medical History:   Diagnosis Date   • Anesthesia complication     SLOW TO WAKE UP   • Arthritis    • Breast cancer (HCC) 2021    Invasive ductal carcinoma of left breast    • History of kidney cancer     S/P right nephrectomy in 1991   • Hypertension    • Tumor, thyroid    • Type 2 diabetes mellitus (HCC)         Past Surgical History:   Procedure Laterality Date   • BACK SURGERY  1990   • BREAST BIOPSY Right    • BREAST LUMPECTOMY WITH SENTINEL NODE BIOPSY Left  2022    Procedure: Left ultrasound-guided partial mastectomy and sentinel lymph node biopsy;  Surgeon: Beth Delong MD;  Location: [facility];  Service: General;  Laterality: Left;   • BREAST RECONSTRUCTION Bilateral 2022    Procedure: Right breast reduction/mastopexy, left breast oncoplastic closure, bilateral chest liposuction;  Surgeon: Simon Castro MD;  [facility];  Service: Plastics;  Laterality: Bilateral;   • COLONOSCOPY     • HYSTERECTOMY     • KNEE SURGERY Right     RECONSTRUCTION   • KNEE SURGERY Left     RECONSTRUCTION   • NEPHRECTOMY Right    • REDUCTION MAMMAPLASTY     • THYROID BIOPSY Bilateral         Current Outpatient Medications on File Prior to Visit   Medication Sig Dispense Refill   • Blood Glucose Monitoring Suppl (OneTouch Verio Reflect) w/Device kit See Admin Instructions.     • glucose blood test strip 1 each by Other route Daily. 25 each 2   • Lancets (freestyle) lancets 1 each by Other route Daily. 100 each 4   • lisinopril (PRINIVIL,ZESTRIL) 20 MG tablet Take 1 tablet by mouth Daily. 30 tablet 0   • metFORMIN ER (GLUCOPHAGE-XR) 500 MG 24 hr tablet Take 2 tablets by mouth 2 (Two) Times a Day With Meals. 360 tablet 1     No current facility-administered medications on file prior to visit.        ALLERGIES:    Allergies   Allergen Reactions   • Hydrocodone Anaphylaxis     PER PATIENT   • Penicillins Itching        Social History     Socioeconomic History   • Marital status:      Spouse name: Lucas   • Number of children: 0   • Highest education level: Bachelor's degree (e.g., BA, AB, BS)   Tobacco Use   • Smoking status: Former Smoker     Packs/day: 0.00     Years: 0.00     Pack years: 0.00     Types: Cigarettes     Quit date:      Years since quittin.1   • Smokeless tobacco: Never Used   Vaping Use   • Vaping Use: Never used   Substance and Sexual Activity   • Alcohol use: Yes     Alcohol/week: 0.0  standard drinks     Comment: rarely   • Drug use: Never   • Sexual activity: Defer        Family History   Problem Relation Age of Onset   • Diabetes type II Mother    • Hypertension Mother    • Thyroid cancer Mother    • Breast cancer Maternal Grandmother    • Colon cancer Maternal Grandmother    • Alzheimer's disease Maternal Grandfather    • Malig Hyperthermia Neg Hx         Review of Systems   Constitutional: Negative for appetite change, chills, diaphoresis, fatigue, fever and unexpected weight change.   HENT: Negative for hearing loss, sore throat and trouble swallowing.    Respiratory: Negative for cough, chest tightness, shortness of breath and wheezing.    Cardiovascular: Negative for chest pain, palpitations and leg swelling.   Gastrointestinal: Negative for abdominal distention, abdominal pain, constipation, diarrhea, nausea and vomiting.   Genitourinary: Negative for dysuria, frequency, hematuria and urgency.   Musculoskeletal: Negative for joint swelling.        No muscle weakness.   Skin: Negative for rash and wound.   Neurological: Negative for seizures, syncope, speech difficulty, weakness, numbness and headaches.   Hematological: Negative for adenopathy. Does not bruise/bleed easily.   Psychiatric/Behavioral: Negative for behavioral problems, confusion and suicidal ideas.   All other systems reviewed and are negative.  Patient has pelvic tenderness in the left upper inner quadrant of the breast and intermittent left nipple discharge which is nonbloody and very minimal    Family history:  Mother had thyroid cancer. Grandmother had colon cancer and breast cancer on the maternal side and great grandmother had breast cancer on the maternal side. Patient has had thyroid nodule which was biopsied 10 years ago and benign. She had renal cell cancer s/p left nephrectomy.    OB/GYN history  Age of menstruation: 13 years  Age of menopause: 50+ years   0 para 0, no miscarriages  Patient was exposed to  "birth control pills only the posterior of her marriage and took it just for 1 year  Patient did not take any postmenopausal hormone replacement therapy  Patient had hysterectomy 30 years ago.    Social history: She lost her  just recently and is willing upset about that. She works as a  in [employer]. She smoked just a few cigarettes for short very short time when she was in college. She does not drink alcohol. She has no kids.    Past medical history:  Hypertension, followed by her primary care  Diabetes mellitus for which she is on Metformin  Thyroid nodule which on biopsy was benign in the past    Objective     Vitals:    02/09/22 0910   BP: 147/85   Pulse: 74   Resp: 16   Temp: 97.5 °F (36.4 °C)   TempSrc: Temporal   SpO2: 95%   Weight: 89.1 kg (196 lb 6.4 oz)   Height: 170.2 cm (67.01\")   PainSc: 0-No pain     Current Status 2/9/2022   ECOG score 0       Physical Exam        CONSTITUTIONAL:  Vital signs reviewed.  No distress, looks comfortable.  EYES:  Conjunctivae and lids unremarkable.  PERRLA  RESPIRATORY:  Normal respiratory effort.  Lungs clear to auscultation bilaterally.  BREAST: Right breast: No skin changes, no evidence of breast mass, no nipple discharge, no evidence of any right axillary adenopathy or right supraclavicular adenopathy  Left breast: No evidence of any skin changes, no evidence of any left breast mass and no evidence of left nipple discharge as well as no left axillary adenopathy or left supraclavicular adenopathy.  CARDIOVASCULAR:  Normal S1, S2.  No murmurs rubs or gallops.  No significant lower extremity edema.  GASTROINTESTINAL: Abdomen appears unremarkable.  Nontender.  No hepatomegaly.  No splenomegaly.  LYMPHATIC:  No cervical, supraclavicular, axillary lymphadenopathy.  SKIN:  Warm.  No rashes.  PSYCHIATRIC:  Normal judgment and insight.  Normal mood and affect.    RECENT LABS:  Hematology WBC   Date Value Ref Range Status   02/09/2022 " 7.33 3.40 - 10.80 10*3/mm3 Final   09/23/2021 7.1 3.4 - 10.8 x10E3/uL Final     RBC   Date Value Ref Range Status   02/09/2022 4.74 3.77 - 5.28 10*6/mm3 Final   09/23/2021 5.03 3.77 - 5.28 x10E6/uL Final     Hemoglobin   Date Value Ref Range Status   02/09/2022 14.0 12.0 - 15.9 g/dL Final     Hematocrit   Date Value Ref Range Status   02/09/2022 43.3 34.0 - 46.6 % Final     Platelets   Date Value Ref Range Status   02/09/2022 297 140 - 450 10*3/mm3 Final          Assessment/Plan     1.  T2 N0 M0, anatomic stage IIa , prognostic stage Ib invasive ductal carcinoma of the left breast at 10 o'clock position, grade 2, ER 91 x 100%, AK 91/100%, HER-2/juan 1+, Ki-67 8%.  No evidence of lymphovascular space invasion or perineural invasion  · Patient has not had screening mammogram for 25 years  · She felt a mass in September 2021 when she was holding the cat and noticed pain in the left breast upper inner quadrant and then she felt the mass, she was seen by her primary care physician  · Diagnostic mammogram, November 10, 2021: Bilateral showed a 2.5 x 2.6 x 2.3 cm mass in the posterior one third upper inner quadrant of the left breast.  Internal coarse calcification and areas of fat necrosis were noted.  No other dominant no masses were seen.  There was no skin thickening, nipple retraction or axillary adenopathy  · Ultrasound showed a 2.4 x 1.9 x 1.4 cm mass in the 10 o'clock position 5 cm from the nipple  · December 2, 2021 ultrasound-guided left breast biopsy showed invasive ductal carcinoma, grade 2 with a Eldon score of 6, %, %, HER-2/juan 1+, Ki-67 8%  · December 23, 2021: Patient underwent MRI of the breast which showed a 2.5 x 2.8 x 2.1 cm irregular enhancing mass at 10 o'clock position, 8 cm from the nipple.  There was no evidence of any left axillary or internal mammary chain lymphadenopathy.  Right breast was negative  · I had a lengthy discussion with the patient about the nature of the breast  cancer that was present.  We told her that was the most common type of breast cancer and given that it was ER/IN positive HER-2/juan negative without any lymphovascular or perineural invasion and Ki-67 of 8% it does not appear to be a highly aggressive disease.  He states that the lump did not increase in size since she first noticed it.  · We then discussed about options of therapy of surgery upfront, followed by evaluation for Oncotype DX testing to see if she will be eligible for any chemotherapy and also will be eligible for endocrine therapy.  · Patient is to see Dr. Beth Delong  today and we will await her input, I did discuss that likely the best option is for consideration of surgery upfront and will await to hear from Dr. Delong  · However patient has family history of breast cancer in her maternal grandmother and great-grandmother and colon cancer in her maternal grandmother, thyroid cancer in her mother and personal history of renal cell cancer.  · We then discussed about obtaining INVITAE genetic test today and has been ordered in our office for the 9 breast cancer gene with reflux to be 47 gene panel.  · I told her surgical options will be discussed by Dr. Delong.  · January 17, 2022 Left breast lumpectomy, grade 2 invasive ductal carcinoma measuring 30 mm with perineural invasion and microcalcifications identified adjacent to the clip and biopsy site change.  Focal minimal associated atypical ductal hyperplasia but no definite in situ carcinoma is identified all margins are free of tumor closest margin being 2.8 mm.  Incidental fibroadenoma 2.6 mm maximally with focal fat necrosis.  0 of 1 sentinel lymph node negative.  Right breast mammoplasty was benign    2.  Family history of malignancy:  · However patient has family history of breast cancer in her maternal grandmother and great-grandmother and colon cancer in her maternal grandmother, thyroid cancer in her mother and personal history of renal  cell cancer.  · We then discussed about obtaining INVITAE genetic test today and has been ordered in our office for the 9 breast cancer gene with reflux to be 47 gene panel.  · INVITAE genetic test negative for 47 genes    3.  Hypertension currently on lisinopril followed by primary care physician.  Today mildly elevated blood pressure    4.  History of diabetes mellitus currently on Metformin    5.  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  History of renal cell cancer s/p left nephrectomy without evidence of recurrence and at age 31    Plan  · Patient is s/p left lumpectomy with sentinel lymph node biopsy  · Reviewed the results in detail with the patient  · Patient has been seen by radiation oncology with plans to start radiation pending the results of the Oncotype DX  · Patient has PT2N0 ER/NH positive HER-2/juan negative invasive ductal carcinoma with perineural invasion but all of the lymph nodes negative.  · Await results of Oncotype if in the low risk or intermediate risk category being postmenopausal will not benefit from chemotherapy but if she falls in the high risk category we will need to treat her with chemotherapy either Taxotere Cytoxan every 3 weeks x4 or Adriamycin Cytoxan dose dense followed by weekly Taxol  · If Oncotype falls in the lower the intermediate score we can potentially treat her with aromatase inhibitor therapy.  · Final recommendation to be done after Oncotype DX is back    I spent 40 total minutes, face-to-face, caring for Daysi today.  Greater than 50% of this time involved counseling and/or coordination of care as documented within this note.    MD Dr. Zarina Hernandez Dr., Dr.

## 2022-02-16 LAB
LAB AP CASE REPORT: NORMAL
LAB AP CLINICAL INFORMATION: NORMAL
LAB AP DIAGNOSIS COMMENT: NORMAL
LAB AP SYNOPTIC CHECKLIST: NORMAL
Lab: NORMAL
PATH REPORT.ADDENDUM SPEC: NORMAL
PATH REPORT.FINAL DX SPEC: NORMAL
PATH REPORT.GROSS SPEC: NORMAL

## 2022-02-17 ENCOUNTER — HOSPITAL ENCOUNTER (OUTPATIENT)
Dept: PHYSICAL THERAPY | Facility: HOSPITAL | Age: 62
Discharge: HOME | End: 2022-02-17

## 2022-02-17 DIAGNOSIS — Z17.0 MALIGNANT NEOPLASM OF UPPER-INNER QUADRANT OF LEFT BREAST IN FEMALE, ESTROGEN RECEPTOR POSITIVE: Primary | ICD-10-CM

## 2022-02-17 DIAGNOSIS — Z91.89 AT RISK FOR LYMPHEDEMA: ICD-10-CM

## 2022-02-17 DIAGNOSIS — C50.212 MALIGNANT NEOPLASM OF UPPER-INNER QUADRANT OF LEFT BREAST IN FEMALE, ESTROGEN RECEPTOR POSITIVE: Primary | ICD-10-CM

## 2022-02-17 DIAGNOSIS — Z98.890 S/P LUMPECTOMY, LEFT BREAST: ICD-10-CM

## 2022-02-17 PROCEDURE — 97535 SELF CARE MNGMENT TRAINING: CPT

## 2022-02-17 PROCEDURE — 93702 BIS XTRACELL FLUID ANALYSIS: CPT

## 2022-02-17 PROCEDURE — 97161 PT EVAL LOW COMPLEX 20 MIN: CPT

## 2022-02-17 NOTE — THERAPY EVALUATION
Physical Therapy Lymphedema Initial Evaluation  [facility]     Patient Name: Daysi Lugo  : 1960  MRN: [id number]  Today's Date: 2022      Visit Date: 2022    Visit Dx:    ICD-10-CM ICD-9-CM   1. Malignant neoplasm of upper-inner quadrant of left breast in female, estrogen receptor positive (HCC)  C50.212 174.2    Z17.0 V86.0   2. S/P lumpectomy, left breast  Z98.890 V45.89   3. At risk for lymphedema  Z91.89 V49.89       Patient Active Problem List   Diagnosis   • Personal history of kidney cancer   • Malignant neoplasm of upper-inner quadrant of left breast in female, estrogen receptor positive (HCC)   • Family history of cancer        Past Medical History:   Diagnosis Date   • Anesthesia complication     SLOW TO WAKE UP   • Arthritis    • Breast cancer (HCC)     Invasive ductal carcinoma of left breast    • History of kidney cancer     S/P right nephrectomy in    • Hypertension    • Tumor, thyroid    • Type 2 diabetes mellitus (HCC)         Past Surgical History:   Procedure Laterality Date   • BACK SURGERY     • BREAST BIOPSY Right    • BREAST LUMPECTOMY WITH SENTINEL NODE BIOPSY Left 2022    Procedure: Left ultrasound-guided partial mastectomy and sentinel lymph node biopsy;  Surgeon: Beth Delong MD;  Location: [facility];  Service: General;  Laterality: Left;   • BREAST RECONSTRUCTION Bilateral 2022    Procedure: Right breast reduction/mastopexy, left breast oncoplastic closure, bilateral chest liposuction;  Surgeon: Simon Castro MD;  Location: [facility];  Service: Plastics;  Laterality: Bilateral;   • COLONOSCOPY     • HYSTERECTOMY     • KNEE SURGERY Right     RECONSTRUCTION   • KNEE SURGERY Left     RECONSTRUCTION   • NEPHRECTOMY Right    • REDUCTION MAMMAPLASTY     • THYROID BIOPSY Bilateral        Visit Dx:    ICD-10-CM ICD-9-CM   1. Malignant neoplasm of upper-inner quadrant of left breast in female,  estrogen receptor positive (HCC)  C50.212 174.2    Z17.0 V86.0   2. S/P lumpectomy, left breast  Z98.890 V45.89   3. At risk for lymphedema  Z91.89 V49.89        Patient History     Row Name 02/17/22 1200             History    Chief Complaint --  none  -LB      Date Current Problem(s) Began 01/17/22  -LB      Brief Description of Current Complaint Pt s/p L lumpectomy and SLNB with R mastoplexy reduction B liposuction and oncoplastic closure. She had 1 LN removed which was negative. She is scheduled for 3 weeks of radiation therapy and is awaiting oncotype. Her surgeon is Dr. Delong.  -LB      Previous treatment for THIS PROBLEM Surgery  -LB      Surgery Date: 01/17/22  -LB      Patient/Caregiver Goals Know what to do to help the symptoms  -LB      Patient/Caregiver Goals Comment return to PLOF  -LB      Hand Dominance right-handed  -LB      Occupation/sports/leisure activities IT; travels frequently  -LB      Patient seeing anyone else for problem(s)? yes  -LB      Are you or can you be pregnant No  -LB              Pain     Pain at Present 0  -LB      Pain at Best 0  -LB      Pain at Worst 0  -LB      Is your sleep disturbed? No  -LB      Difficulties at work? no  -LB      Difficulties with ADL's? no  -LB      Difficulties with recreational activities? no  -LB              Fall Risk Assessment    Any falls in the past year: No  -LB              Services    Prior Rehab/Home Health Experiences No  -LB      Are you currently receiving Home Health services No  -LB      Do you plan to receive Home Health services in the near future No  -LB              Daily Activities    Primary Language English  -LB      Are you able to read Yes  -LB      Are you able to write Yes  -LB      Pt Participated in POC and Goals Yes  -LB              Safety    Are you being hurt, hit, or frightened by anyone at home or in your life? No  -LB      Are you being neglected by a caregiver No  -LB      Have you had any of the following issues  with N/A  -LB            User Key  (r) = Recorded By, (t) = Taken By, (c) = Cosigned By    Initials Name Provider Type    LB Shana Craig, PT Physical Therapist                 Lymphedema     Row Name 02/17/22 1200             Subjective Pain    Able to rate subjective pain? yes  -LB      Pre-Treatment Pain Level 0  -LB      Post-Treatment Pain Level 0  -LB      Subjective Pain Comment I am doing well, no complaints.  -LB              Subjective Comments    Subjective Comments My oncotype came back yesterday but I have not been given the final decision by oncology yet, I see her next week.  -LB              Lymphedema Assessment    Lymphedema Classification RUE:; at risk/stage 0  -LB      Lymphedema Cancer Related Sx right; lumpectomy; sentinel node biopsy  -LB      Lymph Nodes Removed # 1  -LB      Positive Lymph Nodes # 0  -LB      Chemo Received no  awaiting oncotype  -LB      Radiation Therapy Received yes  upcoming  -LB      Infections or Cellulitis? no  -LB              Posture/Observations    Posture/Observations Comments WNL  -LB              General ROM    GENERAL ROM COMMENTS BUE WFL  -LB              MMT (Manual Muscle Testing)    General MMT Comments BUE WFL; pt with hx of body building; no current lifting program  -LB              Lymphedema Edema Assessment    Edema Assessment Comment no obvious edema  -LB              Skin Changes/Observations    Skin Observations Comment well healing incision  -LB              Lymphedema Sensation    Lymphedema Sensation Comments normal  -LB              Lymphedema Pulses/Capillary Refill    Lymph Pulses Capillary Refill Comments normal  -LB              Lymphedema Measurements    Lymphedema Measurements Comments tried on Marti Lite Large; good fit  -LB              LUE Quick Girth (cm)    Axilla 35.4 cm  -LB      Mid upper arm 30.5 cm  -LB      Elbow 26 cm  -LB      Mid forearm 24 cm  -LB      Wrist crease 16.2 cm  -LB      Other 1 41 cm  length  -LB      LUE Quick  Girth Total 173.1  -LB              Compression/Skin Care    Compression/Skin Care Comments discussed compression garment wear during radiation  -LB              L-Dex Bioimpedence Screening    L-Dex Measurement Extremity LUE  -LB      L-Dex Patient Position Standing  -LB      L-Dex UE Dominate Side Right  -LB      L-Dex UE At Risk Side Left  -LB      L-Dex UE Pre Surgical Value No  -LB      L-Dex UE Score -5.8  -LB      L-Dex UE Comment WNL  -LB      $ L-Dex Charge yes  -LB            User Key  (r) = Recorded By, (t) = Taken By, (c) = Cosigned By    Initials Name Provider Type    Shana Kitchen, PT Physical Therapist                                Therapy Education  Education Details: discussed s/s of lymphedema, compression garment use and wear schedule, bioimpedance results and interpretation, lymphedema prevention, radiation skin care, slow return/build up to weight lifting  Given: Symptoms/condition management, Edema management, Posture/body mechanics  Program: New  How Provided: Verbal, Demonstration, Written  Provided to: Patient  Level of Understanding: Teach back education performed, Verbalized, Demonstrated  45048 - PT Self Care/Mgmt Minutes: 15       OP Exercises     Row Name 02/17/22 1200             Subjective Comments    Subjective Comments My oncotype came back yesterday but I have not been given the final decision by oncology yet, I see her next week.  -LB              Subjective Pain    Able to rate subjective pain? yes  -LB      Pre-Treatment Pain Level 0  -LB      Post-Treatment Pain Level 0  -LB      Subjective Pain Comment I am doing well, no complaints.  -LB            User Key  (r) = Recorded By, (t) = Taken By, (c) = Cosigned By    Initials Name Provider Type    Shana Kitchen, PT Physical Therapist                             PT OP Goals     Row Name 02/17/22 1300          Long Term Goals    LTG Date to Achieve 03/19/22  -LB     LTG 1 Pt will acquire appropriately fitted compression  garment and verbalize appropriate wear schedule.  -LB     LTG 1 Progress New  -LB     LTG 2 Pt will maintain LDex bioimpedance score WNL.  -LB     LTG 2 Progress New  -LB     LTG 3 Pt will report good tolerance to return to weight lifting.  -LB     LTG 3 Progress New  -LB            Time Calculation    PT Goal Re-Cert Due Date 05/18/22  -LB           User Key  (r) = Recorded By, (t) = Taken By, (c) = Cosigned By    Initials Name Provider Type    LB Shana Craig, PT Physical Therapist                 PT Assessment/Plan     Row Name 02/17/22 1310          PT Assessment    Functional Limitations Other (comment)  at risk for lymphedema  -LB     Impairments Edema; Impaired lymphatic circulation  -LB     Assessment Comments Daysi Lugo is a 62 y.o. female recently diagnosed with Left Breast Cancer, presents to therapy in evolving condition, s/p Left Lumpectomywith [place] Node Biopsy with immediate reconstruction with Oncoplastic Closure with contralateral Reduction for Symmetry performed on 1/17/22 by surgeons Dr. Delong/Juno. Daysi Lugo is at increased risk of post Lumpectomy lymphedema syndrome Left Upper Extremity due to Radiation therapy Breast surgery Lymph Node Removal . She presents without post-operative complaints and is awaiting Oncotype to determine if chemotherapy is warranted for her. Currently, negative s/s of lymphedema, infection, seroma, or axillary cording. We did complete a baseline post operative bioimpedance assessment, with current L-Dex score of -5.8 , which is WNL. At this time, functional limitations can be affected by but not limited to. Daysi Lugo will benefit from skilled formal Breast Care Physical Therapy at this time to address listed dysfunctions. Please refer to Plan.  -LB     Please refer to paper survey for additional self-reported information Yes  -LB     Rehab Potential Good  -LB     Patient/caregiver participated in establishment of treatment plan and goals Yes   -LB     Patient would benefit from skilled therapy intervention Yes  -LB            PT Plan    PT Frequency Other (comment)  -LB     Predicted Duration of Therapy Intervention (PT) repeat bioimpedance in 3 months (post radiation)  -LB     Planned CPT's? PT EVAL LOW COMPLEXITY: 27501; PT RE-EVAL: 15418; PT THER PROC EA 15 MIN: 43410; PT THER ACT EA 15 MIN: 97997; PT MANUAL THERAPY EA 15 MIN: 11920; PT NEUROMUSC RE-EDUCATION EA 15 MIN: 49835; PT SELF CARE/HOME MGMT/TRAIN EA 15: 59659; PT BIS XTRACELL FLUID ANALYSIS: 55573  -LB     PT Plan Comments repeat bioimpedance; compression garment advice for plane, has pt returned to bodybuilding/weight lifting?, review lymphedema precautions/prevention for air travel  -LB           User Key  (r) = Recorded By, (t) = Taken By, (c) = Cosigned By    Initials Name Provider Type    Shana Kitchen, LANRE Physical Therapist                   Outcome Measure Options: Quick DASH  Quick DASH  Open a tight or new jar.: No Difficulty  Do heavy household chores (e.g., wash walls, wash floors): No Difficulty  Carry a shopping bag or briefcase: No Difficulty  Wash your back: No Difficulty  Use a knife to cut food: No Difficulty  Recreational activities in which you take some force or impact through your arm, should or hand (e.g. golf, hammering, tennis, etc.): No Difficulty  During the past week, to what extent has your arm, shoulder, or hand problem interfered with your normal social activites with family, friends, neighbors or groups?: Not at all  During the past week, were you limited in your work or other regular daily activities as a result of your arm, shoulder or hand problem?: Not limited at all  Arm, Shoulder, or hand pain: None  Tingling (pins and needles) in your arm, shoulder, or hand: None  During the past week, how much difficulty have you had sleeping because of the pain in your arm, shoulder or hand?: No difficulty  Number of Questions Answered: 11  Quick DASH Score: 0  Quick  Dash Comments: 0% disability         Time Calculation:   Start Time: 1230  Stop Time: 1315  Time Calculation (min): 45 min  Total Timed Code Minutes- PT: 15 minute(s)  Timed Charges  74922 - PT Self Care/Mgmt Minutes: 15  Total Minutes  Timed Charges Total Minutes: 15   Total Minutes: 15   Therapy Charges for Today     Code Description Service Date Service Provider Modifiers Qty    84880092098 HC PT BIS XTRACELL FLUID ANALYSIS 2/17/2022 Shana Craig, PT  1    97289391414 HC PT SELF CARE/MGMT/TRAIN EA 15 MIN 2/17/2022 Shana Craig, PT GP 1    98262830962 HC PT EVAL LOW COMPLEXITY 1 2/17/2022 Shaan Craig, PT GP 1          PT G-Codes  Outcome Measure Options: Quick DASH  Quick DASH Score: 0         Shana Craig, PT  2/17/2022

## 2022-02-23 ENCOUNTER — OFFICE VISIT (OUTPATIENT)
Dept: FAMILY MEDICINE CLINIC | Facility: CLINIC | Age: 62
End: 2022-02-23

## 2022-02-23 VITALS
HEART RATE: 66 BPM | TEMPERATURE: 97.3 F | DIASTOLIC BLOOD PRESSURE: 82 MMHG | SYSTOLIC BLOOD PRESSURE: 142 MMHG | WEIGHT: 198.8 LBS | HEIGHT: 67 IN | BODY MASS INDEX: 31.2 KG/M2 | OXYGEN SATURATION: 99 %

## 2022-02-23 DIAGNOSIS — I10 PRIMARY HYPERTENSION: Primary | ICD-10-CM

## 2022-02-23 PROCEDURE — 99214 OFFICE O/P EST MOD 30 MIN: CPT | Performed by: FAMILY MEDICINE

## 2022-02-23 RX ORDER — AMLODIPINE BESYLATE 5 MG/1
5 TABLET ORAL DAILY
Qty: 30 TABLET | Refills: 0 | Status: SHIPPED | OUTPATIENT
Start: 2022-02-23 | End: 2022-03-22 | Stop reason: SDUPTHER

## 2022-02-23 RX ORDER — LISINOPRIL 10 MG/1
10 TABLET ORAL DAILY
Qty: 90 TABLET | Refills: 0 | Status: SHIPPED | OUTPATIENT
Start: 2022-02-23 | End: 2022-03-22 | Stop reason: SDUPTHER

## 2022-02-23 NOTE — PROGRESS NOTES
"Chief Complaint  Hypertension and Diabetes    Subjective          Daysi Lugo presents to [facility]  Patient is here today for uncontrolled new onset hypertension.  She is currently taking lisinopril to 20 mg daily.  Patient has not been monitoring home blood pressures and it is still elevated here today.  Patient states that she has no side effects and feels no different taking lisinopril.      Objective   Vital Signs:   /82   Pulse 66   Temp 97.3 °F (36.3 °C)   Ht 170.2 cm (67\")   Wt 90.2 kg (198 lb 12.8 oz)   SpO2 99%   BMI 31.14 kg/m²     Physical Exam  Vitals and nursing note reviewed.   Constitutional:       Appearance: Normal appearance. She is well-developed and normal weight.   HENT:      Head: Normocephalic and atraumatic.      Right Ear: External ear normal.      Left Ear: External ear normal.      Nose: Nose normal.   Eyes:      General: No scleral icterus.     Conjunctiva/sclera: Conjunctivae normal.   Cardiovascular:      Rate and Rhythm: Normal rate and regular rhythm.      Heart sounds: Normal heart sounds.   Pulmonary:      Effort: Pulmonary effort is normal.      Breath sounds: Normal breath sounds.   Musculoskeletal:      Cervical back: Normal range of motion and neck supple.      Right lower leg: No edema.      Left lower leg: No edema.   Lymphadenopathy:      Cervical: No cervical adenopathy.   Skin:     General: Skin is warm and dry.      Findings: No rash.   Neurological:      Mental Status: She is alert and oriented to person, place, and time.   Psychiatric:         Mood and Affect: Mood normal.         Behavior: Behavior normal.         Thought Content: Thought content normal.         Judgment: Judgment normal.        Result Review :   The following data was reviewed by: Zarina Tineo DO on 02/23/2022:  Common labs    Common Labsle 1/12/22 1/12/22 1/12/22 2/7/22 2/9/22 2/9/22    1054 1342 1342  0900 0900   Glucose 132 (A)  190 (A) 139 (A)  " 170 (A)   BUN 14  14 18  23   Creatinine 0.79  0.89 1.04 (A)  0.88   eGFR Non  Am 82  65 59 (A)  66   eGFR  Am 95   67     Sodium 145 (A)  143 142  139   Potassium 4.4  3.8 4.4  4.4   Chloride 105  104 101  102   Calcium 9.6  8.9 10.0  9.9   Total Protein 6.4   6.7     Albumin 4.5   4.4  4.50   Total Bilirubin 0.3   0.3  0.5   Alkaline Phosphatase 123 (A)   129 (A)  123 (A)   AST (SGOT) 15   14  15   ALT (SGPT) 21   20  21   WBC  6.49   7.33    Hemoglobin  13.3   14.0    Hematocrit  39.8   43.3    Platelets  208   297    (A) Abnormal value       Comments are available for some flowsheets but are not being displayed.           TSH    TSH 9/23/21   TSH 2.260                     Assessment and Plan    Diagnoses and all orders for this visit:    1. Primary hypertension (Primary)  -     Basic Metabolic Panel  -     lisinopril (PRINIVIL,ZESTRIL) 10 MG tablet; Take 1 tablet by mouth Daily.  Dispense: 90 tablet; Refill: 0  -     amLODIPine (NORVASC) 5 MG tablet; Take 1 tablet by mouth Daily.  Dispense: 30 tablet; Refill: 0    Patient is here to follow-up on uncontrolled hypertension.  I will recheck kidney function today and as long as it is normal I will have the patient decrease the lisinopril back to 10 mg daily and we will start amlodipine.  Surveillance labs were obtained today and any medication changes will be made based on lab results and will be called to the patient later this week.  Patient was advised to contact me if she has any issues or concerns with medication changes above.      Follow Up   Return in about 3 weeks (around 3/16/2022) for HTN.  Patient was given instructions and counseling regarding her condition or for health maintenance advice. Please see specific information pulled into the AVS if appropriate.

## 2022-02-24 LAB
BUN SERPL-MCNC: 15 MG/DL (ref 8–27)
BUN/CREAT SERPL: 19 (ref 12–28)
CALCIUM SERPL-MCNC: 10 MG/DL (ref 8.7–10.3)
CHLORIDE SERPL-SCNC: 103 MMOL/L (ref 96–106)
CO2 SERPL-SCNC: 24 MMOL/L (ref 20–29)
CREAT SERPL-MCNC: 0.8 MG/DL (ref 0.57–1)
GLUCOSE SERPL-MCNC: 114 MG/DL (ref 65–99)
POTASSIUM SERPL-SCNC: 4.7 MMOL/L (ref 3.5–5.2)
SODIUM SERPL-SCNC: 144 MMOL/L (ref 134–144)

## 2022-02-25 ENCOUNTER — LAB (OUTPATIENT)
Dept: LAB | Facility: HOSPITAL | Age: 62
End: 2022-02-25

## 2022-02-25 ENCOUNTER — OFFICE VISIT (OUTPATIENT)
Dept: ONCOLOGY | Facility: CLINIC | Age: 62
End: 2022-02-25

## 2022-02-25 VITALS
SYSTOLIC BLOOD PRESSURE: 171 MMHG | RESPIRATION RATE: 17 BRPM | WEIGHT: 193 LBS | BODY MASS INDEX: 30.29 KG/M2 | OXYGEN SATURATION: 96 % | HEIGHT: 67 IN | DIASTOLIC BLOOD PRESSURE: 90 MMHG | TEMPERATURE: 97 F | HEART RATE: 71 BPM

## 2022-02-25 DIAGNOSIS — C50.212 MALIGNANT NEOPLASM OF UPPER-INNER QUADRANT OF LEFT BREAST IN FEMALE, ESTROGEN RECEPTOR POSITIVE: Primary | ICD-10-CM

## 2022-02-25 DIAGNOSIS — Z17.0 MALIGNANT NEOPLASM OF UPPER-INNER QUADRANT OF LEFT BREAST IN FEMALE, ESTROGEN RECEPTOR POSITIVE: ICD-10-CM

## 2022-02-25 DIAGNOSIS — F32.A DEPRESSION, UNSPECIFIED DEPRESSION TYPE: ICD-10-CM

## 2022-02-25 DIAGNOSIS — C50.212 MALIGNANT NEOPLASM OF UPPER-INNER QUADRANT OF LEFT BREAST IN FEMALE, ESTROGEN RECEPTOR POSITIVE: ICD-10-CM

## 2022-02-25 DIAGNOSIS — C64.9 RENAL CELL CARCINOMA, UNSPECIFIED LATERALITY: ICD-10-CM

## 2022-02-25 DIAGNOSIS — Z17.0 MALIGNANT NEOPLASM OF UPPER-INNER QUADRANT OF LEFT BREAST IN FEMALE, ESTROGEN RECEPTOR POSITIVE: Primary | ICD-10-CM

## 2022-02-25 LAB
ALBUMIN SERPL-MCNC: 4.6 G/DL (ref 3.5–5.2)
ALBUMIN/GLOB SERPL: 1.7 G/DL (ref 1.1–2.4)
ALP SERPL-CCNC: 114 U/L (ref 38–116)
ALT SERPL W P-5'-P-CCNC: 25 U/L (ref 0–33)
ANION GAP SERPL CALCULATED.3IONS-SCNC: 9.1 MMOL/L (ref 5–15)
AST SERPL-CCNC: 13 U/L (ref 0–32)
BASOPHILS # BLD AUTO: 0.02 10*3/MM3 (ref 0–0.2)
BASOPHILS NFR BLD AUTO: 0.3 % (ref 0–1.5)
BILIRUB SERPL-MCNC: 0.6 MG/DL (ref 0.2–1.2)
BUN SERPL-MCNC: 23 MG/DL (ref 6–20)
BUN/CREAT SERPL: 25.6 (ref 7.3–30)
CALCIUM SPEC-SCNC: 10.2 MG/DL (ref 8.5–10.2)
CHLORIDE SERPL-SCNC: 103 MMOL/L (ref 98–107)
CO2 SERPL-SCNC: 28.9 MMOL/L (ref 22–29)
CREAT SERPL-MCNC: 0.9 MG/DL (ref 0.6–1.1)
DEPRECATED RDW RBC AUTO: 45.5 FL (ref 37–54)
EOSINOPHIL # BLD AUTO: 0.34 10*3/MM3 (ref 0–0.4)
EOSINOPHIL NFR BLD AUTO: 4.6 % (ref 0.3–6.2)
ERYTHROCYTE [DISTWIDTH] IN BLOOD BY AUTOMATED COUNT: 13.4 % (ref 12.3–15.4)
GFR SERPL CREATININE-BSD FRML MDRD: 64 ML/MIN/1.73
GLOBULIN UR ELPH-MCNC: 2.7 GM/DL (ref 1.8–3.5)
GLUCOSE SERPL-MCNC: 132 MG/DL (ref 74–124)
HCT VFR BLD AUTO: 43.8 % (ref 34–46.6)
HGB BLD-MCNC: 14.1 G/DL (ref 12–15.9)
IMM GRANULOCYTES # BLD AUTO: 0.01 10*3/MM3 (ref 0–0.05)
IMM GRANULOCYTES NFR BLD AUTO: 0.1 % (ref 0–0.5)
LYMPHOCYTES # BLD AUTO: 1.67 10*3/MM3 (ref 0.7–3.1)
LYMPHOCYTES NFR BLD AUTO: 22.4 % (ref 19.6–45.3)
MCH RBC QN AUTO: 30.1 PG (ref 26.6–33)
MCHC RBC AUTO-ENTMCNC: 32.2 G/DL (ref 31.5–35.7)
MCV RBC AUTO: 93.4 FL (ref 79–97)
MONOCYTES # BLD AUTO: 0.39 10*3/MM3 (ref 0.1–0.9)
MONOCYTES NFR BLD AUTO: 5.2 % (ref 5–12)
NEUTROPHILS NFR BLD AUTO: 5.04 10*3/MM3 (ref 1.7–7)
NEUTROPHILS NFR BLD AUTO: 67.4 % (ref 42.7–76)
NRBC BLD AUTO-RTO: 0 /100 WBC (ref 0–0.2)
PLATELET # BLD AUTO: 216 10*3/MM3 (ref 140–450)
PMV BLD AUTO: 10.3 FL (ref 6–12)
POTASSIUM SERPL-SCNC: 4.1 MMOL/L (ref 3.5–4.7)
PROT SERPL-MCNC: 7.3 G/DL (ref 6.3–8)
RBC # BLD AUTO: 4.69 10*6/MM3 (ref 3.77–5.28)
SODIUM SERPL-SCNC: 141 MMOL/L (ref 134–145)
WBC NRBC COR # BLD: 7.47 10*3/MM3 (ref 3.4–10.8)

## 2022-02-25 PROCEDURE — 36415 COLL VENOUS BLD VENIPUNCTURE: CPT

## 2022-02-25 PROCEDURE — 99214 OFFICE O/P EST MOD 30 MIN: CPT | Performed by: INTERNAL MEDICINE

## 2022-02-25 PROCEDURE — 80053 COMPREHEN METABOLIC PANEL: CPT

## 2022-02-25 PROCEDURE — 85025 COMPLETE CBC W/AUTO DIFF WBC: CPT

## 2022-02-25 RX ORDER — LETROZOLE 2.5 MG/1
2.5 TABLET, FILM COATED ORAL DAILY
Qty: 30 TABLET | Refills: 5 | Status: SHIPPED | OUTPATIENT
Start: 2022-02-25 | End: 2022-03-27

## 2022-02-25 NOTE — PROGRESS NOTES
Subjective     REASON FOR follow-up:     1.  PT2N0, anatomic stage IIa, prognostic stage Ia invasive ductal carcinoma of the left breast,  intermediate grade ER positive, AK positive HER-2/juan negative s/p left partial mastectomy with sentinel lymph node biopsy with oncoplastic closure and right reduction for symmetry on 2022.    · Oncotype DX testin, no need for chemotherapy  · We will plan to start Femara 2022, will start towards the end of radiation        History of Present Illness   Patient is a 62-year-old female with recently diagnosed stage IIa left breast cancer ER/AK positive HER-2/juan negative.  She underwent surgery as of 2022.      Patient's Oncotype DX is back. It is 14. She does not require any chemotherapy. She will require to start on endocrine therapy and being postmenopausal will start her on aromatase inhibitor. She also will require a DEXA scan to evaluate for osteopenia. She is healed up from surgery. She is going to start radiation. Patient is going to receive a total dose of 3990 cGy in 15 fractions followed by boost to the tumor bed 1000 cGy in 5 fractions.    Patient does not have any complaints today and is happy she does not need to go through chemotherapy      Oncologic history:  patient is a 61-year-old female who is a  for [employer]. She recently back in  noticed a mass in the left breast upper inner quadrant. She was playing with a cat and noticed some pain in the breast at which time she felt the breast and felt this mass. She has had history of nipple discharge on and off for years in the left breast but that has been very minimal. There is no bloody discharge. She has not had mammogram for years, at least 25 years according to her. So when she felt the mass she went to her primary care physician Dr. Zarina Tineo who ordered a diagnostic mammogram and found in abnormality in the left  breast at 10 o'clock position which was 2.6 x 2.5 x 2.3 cm. Subsequently she underwent a biopsy which was consistent with invasive ductal carcinoma ER/DC positive HER-2/juan negative.    She does have a family history of malignancy with her grandmother having: And breast cancer on her maternal side. Her maternal great-grandmother had breast cancer. Her mother had thyroid cancer. Patient has had renal cell cancer at age 31 and had to undergo left nephrectomy. She also had a thyroid nodule for which she was seen at [facility] by physician. He had placed her on Synthroid and she had a very severe allergic reaction to Synthroid with swelling and he discontinued that. Patient was to be followed in a years time and has not gone.. But she has not had any issues with that    More recently she lost her  3 weeks ago as he was dealing with colon cancer. He was 70 years of age. He did not want any treatment. She had to take care of of him for the last 16 months and if no her symptoms. 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    Details are as follows    November 10, 2021: Bilateral diagnostic mammogram and ultrasound  FINDINGS: Bilateral digital CC and MLO mammographic and Tomosynthesis  images were obtained. No prior examination is available for comparison.  Scattered fibroglandular densities are seen throughout both breasts. In  the posterior one-third upper inner quadrant of the left breast at the  site of palpable concern corresponding to the overlying triangular skin  marker, there is an irregular mass that measures on the order of 2.6 x  2.5 x 2.3 cm. Internal coarse calcifications and areas of fat necrosis  are noted. I see no other dominant masses in either breast. No areas of  architectural distortion are appreciated. There is no evidence for skin  thickening, nipple retraction or axillary adenopathy.     ULTRASOUND: Targeted sonographic  evaluation of the left breast was  performed through the area of palpable concern which corresponds to the  10-o'clock position on the order of 5 cm from the nipple. At this  location, there is an irregular hypoechoic mass with internal  calcifications. The mass measures 2.4 x 1.9 x 1.4 cm. No detectable  internal vascularity is appreciated. Posterior acoustic shadowing is  noted.     IMPRESSION:  1. There is an irregular mass in the left breast at the 10-o'clock  position corresponding to the site of palpable concern. Sonographically,  the mass measures on the order of 2.4 cm in greatest dimension.  Correlation with an ultrasound-guided left breast biopsy is recommended.  2. There are no findings suspicious for malignancy in the right breast.     November 9, 2021: Ultrasound-guided left breast biopsy showed    Final Diagnosis   1. Left Breast, 10 O'clock, 5 cm from Nipple, Ultrasound-Guided Biopsies for a Mass: INVASIVE MAMMARY                CARCINOMA, NO SPECIAL TYPE (INVASIVE DUCTAL CARCINOMA).               A. Histologic grade: [place] histologic score II (tubules = 3, nuclei = 2, mitosis = 1).               B. Largest contiguous invasive focus measures 10 mm in a core.               C. No definitive in situ component identified.                       D. No lymphovascular nor perineural invasion identified.     Estrogen receptor: %  Progesterone receptor: %  HER-2/juan: 1+, negative  Ki-67: 8%    December 28, 2021: MRI of the breast    FINDINGS:Scattered fibroglandular tissue is seen throughout both  breasts. Minimal background parenchymal enhancement of both breasts is  noted.     Within the posterior one-third upper inner quadrant of the left breast  centered at the 10 o'clock position on the order of 8 cm from nipple  there is an irregular enhancing mass that measures on the order of 2.5  cm in anterior to posterior dimension, 2.8 cm in the mediolateral  dimension and 2.1 cm in the  superior-inferior dimension. A signal void  is seen within the mass that corresponds to a bowtie-shaped metallic  clip. This represents the biopsy-proven site of malignancy.     No other areas of abnormal enhancement or morphology are seen within the  left breast. I see no evidence for abnormal left breast skin, nipple or  chest wall enhancement.  There is no evidence for left axillary or  internal mammary chain adenopathy.     There are no areas of abnormal enhancement or morphology in the right  breast. I see no evidence for abnormal right breast skin, nipple or  chest wall enhancement and there is no evidence for right axillary or  internal mammary chain adenopathy.     IMPRESSION:  1. Biopsy-proven malignancy in the left breast at the 10 o'clock  position in the posterior one-third that measures on the order of 2.8 cm  in greatest dimension with a centrally located bowtie shaped metallic  clip. No other suspicious findings are seen within the left breast and  there is no evidence for left axillary or internal mammary chain  adenopathy.  2. There are no findings suspicious for malignancy in the right breast.     BI-RADS category 6: Known malignancy.     This report was finalized on 12/28/2021 7:24 AM by Dr. Sixto Ramos M.D.     Patient has been scheduled for further recommendations. She has appointment with Dr. Beth Delong at 11 AM today.    January 17, 2022: Patient is s/p surgery left partial mastectomy with sentinel lymph node biopsy      Synoptic Checklist  INVASIVE CARCINOMA OF THE BREAST: Resection (INVASIVE CARCINOMA OF THE BREAST: COMPLETE EXCISION - All Specimens)  Specimen Laterality Left  .  TUMOR  Tumor Site Upper outer quadrant  Clock position  10 o'clock  Tumor Site Distance from nipple (Centimeters): 5 cm  Histologic Type Invasive carcinoma of no special type (ductal)  Histologic Grade ([place] Histologic Score)  Glandular (Acinar) / Tubular Differentiation Score 3  Nuclear  Pleomorphism Score 2  Mitotic Rate Score 1  Overall Grade Grade 2 (scores of 6 or 7)  Tumor Size Greatest dimension of largest invasive focus (Millimeters): 30 mm  Tumor Focality Single focus of invasive carcinoma  Ductal Carcinoma In Situ (DCIS) Not identified  Lobular Carcinoma In Situ (LCIS) Not identified  Tumor Extent  Lymphovascular Invasion Not identified  Dermal Lymphovascular Invasion Not identified  Microcalcifications Present in invasive carcinoma  Present in non-neoplastic tissue  Treatment Effect in the Breast No known presurgical therapy  .  MARGINS  Margin Status for Invasive Carcinoma All margins negative for invasive carcinoma  Distance from Invasive Carcinoma to Closest Margin 12.8 mm  Closest Margin(s) to Invasive Carcinoma Inferior  Distance from Invasive Carcinoma to Posterior Margin 23 mm  Distance from Invasive Carcinoma to Superior Margin 13 mm  Distance from Invasive Carcinoma to Medial Margin  Comment  The patient's corresponding left breast core biopsy material is pulled and reviewed for comparison ([id number]). The  tumors appear similar; no discordance is noted.  Selected slides from this case are shared in internal consultation with Bam Campbell and  who concur.  [facility]/kds  .  REGIONAL LYMPH NODES  Regional Lymph Node Status All regional lymph nodes negative for tumor  Total Number of Lymph Nodes Examined (sentinel and non-sentinel) 1  Number of [place] Nodes Examined 1  .  PATHOLOGIC STAGE CLASSIFICATION (pTNM, AJCC 8th Edition)    pT Category pT2  Regional Lymph Nodes Modifier (sn): [place] node(s) evaluated.  pN Category pN0    ER %  OK %  HER-2/juan 1±  Ki-67 8%    Oncotype DX 14. No plans for chemo    We will start Femara    Past Medical History:   Diagnosis Date   • Anesthesia complication     SLOW TO WAKE UP   • Arthritis    • Breast cancer (HCC) 2021    Invasive ductal carcinoma of left breast    • History of kidney cancer     S/P right nephrectomy in 1991   •  Hypertension    • Tumor, thyroid    • Type 2 diabetes mellitus (HCC)         Past Surgical History:   Procedure Laterality Date   • BACK SURGERY  1990   • BREAST BIOPSY Right    • BREAST LUMPECTOMY WITH SENTINEL NODE BIOPSY Left 1/17/2022    Procedure: Left ultrasound-guided partial mastectomy and sentinel lymph node biopsy;  Surgeon: Beth Delong MD;  Location: [facility] OR;  Service: General;  Laterality: Left;   • BREAST RECONSTRUCTION Bilateral 1/17/2022    Procedure: Right breast reduction/mastopexy, left breast oncoplastic closure, bilateral chest liposuction;  Surgeon: Simon Castro MD;  Location: [facility] OR;  Service: Plastics;  Laterality: Bilateral;   • COLONOSCOPY     • HYSTERECTOMY  1992   • KNEE SURGERY Right 2001    RECONSTRUCTION   • KNEE SURGERY Left 2003    RECONSTRUCTION   • NEPHRECTOMY Right 1991   • REDUCTION MAMMAPLASTY  2008   • THYROID BIOPSY Bilateral 2010        Current Outpatient Medications on File Prior to Visit   Medication Sig Dispense Refill   • amLODIPine (NORVASC) 5 MG tablet Take 1 tablet by mouth Daily. 30 tablet 0   • Blood Glucose Monitoring Suppl (OneTouch Verio Reflect) w/Device kit See Admin Instructions.     • glucose blood test strip 1 each by Other route Daily. 25 each 2   • Lancets (freestyle) lancets 1 each by Other route Daily. 100 each 4   • lisinopril (PRINIVIL,ZESTRIL) 10 MG tablet Take 1 tablet by mouth Daily. 90 tablet 0   • metFORMIN ER (GLUCOPHAGE-XR) 500 MG 24 hr tablet Take 2 tablets by mouth 2 (Two) Times a Day With Meals. 360 tablet 1     No current facility-administered medications on file prior to visit.        ALLERGIES:    Allergies   Allergen Reactions   • Hydrocodone Anaphylaxis     PER PATIENT   • Penicillins Itching        Social History     Socioeconomic History   • Marital status:      Spouse name: Lucas   • Number of children: 0   • Highest education level: Bachelor's degree (e.g., BA, AB, BS)   Tobacco Use   • Smoking  status: Former Smoker     Packs/day: 0.00     Years: 0.00     Pack years: 0.00     Types: Cigarettes     Quit date:      Years since quittin.1   • Smokeless tobacco: Never Used   Vaping Use   • Vaping Use: Never used   Substance and Sexual Activity   • Alcohol use: Yes     Alcohol/week: 0.0 standard drinks     Comment: rarely   • Drug use: Never   • Sexual activity: Defer        Family History   Problem Relation Age of Onset   • Diabetes type II Mother    • Hypertension Mother    • Thyroid cancer Mother    • Breast cancer Maternal Grandmother    • Colon cancer Maternal Grandmother    • Alzheimer's disease Maternal Grandfather    • Malig Hyperthermia Neg Hx         Review of Systems   Constitutional: Negative for appetite change, chills, diaphoresis, fatigue, fever and unexpected weight change.   HENT: Negative for hearing loss, sore throat and trouble swallowing.    Respiratory: Negative for cough, chest tightness, shortness of breath and wheezing.    Cardiovascular: Negative for chest pain, palpitations and leg swelling.   Gastrointestinal: Negative for abdominal distention, abdominal pain, constipation, diarrhea, nausea and vomiting.   Genitourinary: Negative for dysuria, frequency, hematuria and urgency.   Musculoskeletal: Negative for joint swelling.        No muscle weakness.   Skin: Negative for rash and wound.   Neurological: Negative for seizures, syncope, speech difficulty, weakness, numbness and headaches.   Hematological: Negative for adenopathy. Does not bruise/bleed easily.   Psychiatric/Behavioral: Negative for behavioral problems, confusion and suicidal ideas.   All other systems reviewed and are negative.  Patient has pelvic tenderness in the left upper inner quadrant of the breast and intermittent left nipple discharge which is nonbloody and very minimal    Family history:  Mother had thyroid cancer. Grandmother had colon cancer and breast cancer on the maternal side and great grandmother  "had breast cancer on the maternal side. Patient has had thyroid nodule which was biopsied 10 years ago and benign. She had renal cell cancer s/p left nephrectomy.    OB/GYN history  Age of menstruation: 13 years  Age of menopause: 50+ years   0 para 0, no miscarriages  Patient was exposed to birth control pills only the posterior of her marriage and took it just for 1 year  Patient did not take any postmenopausal hormone replacement therapy  Patient had hysterectomy 30 years ago.    Social history: She lost her  just recently and is willing upset about that. She works as a  in [employer]. She smoked just a few cigarettes for short very short time when she was in college. She does not drink alcohol. She has no kids.    Past medical history:  Hypertension, followed by her primary care  Diabetes mellitus for which she is on Metformin  Thyroid nodule which on biopsy was benign in the past    Objective     Vitals:    22 0845   BP: 171/90   Pulse: 71   Resp: 17   Temp: 97 °F (36.1 °C)   TempSrc: Temporal   SpO2: 96%   Weight: 87.5 kg (193 lb)   Height: 170.2 cm (67.01\")   PainSc: 0-No pain     Current Status 2022   ECOG score 0       Physical Exam      Patient screened positive for depression based on a PHQ-9 score of 7 on 2022. Follow-up recommendations include: PCP managing depression.              CONSTITUTIONAL:  Vital signs reviewed.  No distress, looks comfortable.  EYES:  Conjunctivae and lids unremarkable.  PERRLA  EARS,NOSE,MOUTH,THROAT:  Ears and nose appear unremarkable.  Lips, teeth, gums appear unremarkable.  RESPIRATORY:  Normal respiratory effort.  Lungs clear to auscultation bilaterally.  CARDIOVASCULAR:  Normal S1, S2.  No murmurs rubs or gallops.  No significant lower extremity edema.  GASTROINTESTINAL: Abdomen appears unremarkable.  Nontender.  No hepatomegaly.  No splenomegaly.  LYMPHATIC:  No cervical, supraclavicular, axillary " lymphadenopathy.  SKIN:  Warm.  No rashes.  PSYCHIATRIC:  Normal judgment and insight.  Normal mood and affect.    RECENT LABS:  Hematology WBC   Date Value Ref Range Status   02/25/2022 7.47 3.40 - 10.80 10*3/mm3 Final   09/23/2021 7.1 3.4 - 10.8 x10E3/uL Final     RBC   Date Value Ref Range Status   02/25/2022 4.69 3.77 - 5.28 10*6/mm3 Final   09/23/2021 5.03 3.77 - 5.28 x10E6/uL Final     Hemoglobin   Date Value Ref Range Status   02/25/2022 14.1 12.0 - 15.9 g/dL Final     Hematocrit   Date Value Ref Range Status   02/25/2022 43.8 34.0 - 46.6 % Final     Platelets   Date Value Ref Range Status   02/25/2022 216 140 - 450 10*3/mm3 Final          Assessment/Plan     1.  T2 N0 M0, anatomic stage IIa , prognostic stage Ib invasive ductal carcinoma of the left breast at 10 o'clock position, grade 2, ER 91 x 100%, WV 91/100%, HER-2/juan 1+, Ki-67 8%.  No evidence of lymphovascular space invasion or perineural invasion  · Patient has not had screening mammogram for 25 years  · She felt a mass in September 2021 when she was holding the cat and noticed pain in the left breast upper inner quadrant and then she felt the mass, she was seen by her primary care physician  · Diagnostic mammogram, November 10, 2021: Bilateral showed a 2.5 x 2.6 x 2.3 cm mass in the posterior one third upper inner quadrant of the left breast.  Internal coarse calcification and areas of fat necrosis were noted.  No other dominant no masses were seen.  There was no skin thickening, nipple retraction or axillary adenopathy  · Ultrasound showed a 2.4 x 1.9 x 1.4 cm mass in the 10 o'clock position 5 cm from the nipple  · December 2, 2021 ultrasound-guided left breast biopsy showed invasive ductal carcinoma, grade 2 with a [place] score of 6, %, %, HER-2/juan 1+, Ki-67 8%  · December 23, 2021: Patient underwent MRI of the breast which showed a 2.5 x 2.8 x 2.1 cm irregular enhancing mass at 10 o'clock position, 8 cm from the nipple.  There  was no evidence of any left axillary or internal mammary chain lymphadenopathy.  Right breast was negative  · I had a lengthy discussion with the patient about the nature of the breast cancer that was present.  We told her that was the most common type of breast cancer and given that it was ER/MS positive HER-2/juan negative without any lymphovascular or perineural invasion and Ki-67 of 8% it does not appear to be a highly aggressive disease.  He states that the lump did not increase in size since she first noticed it.  · We then discussed about options of therapy of surgery upfront, followed by evaluation for Oncotype DX testing to see if she will be eligible for any chemotherapy and also will be eligible for endocrine therapy.  · Patient is to see Dr. Beth Delong  today and we will await her input, I did discuss that likely the best option is for consideration of surgery upfront and will await to hear from Dr. Delong  · However patient has family history of breast cancer in her maternal grandmother and great-grandmother and colon cancer in her maternal grandmother, thyroid cancer in her mother and personal history of renal cell cancer.  · We then discussed about obtaining INVITAE genetic test today and has been ordered in our office for the 9 breast cancer gene with reflux to be 47 gene panel.  · I told her surgical options will be discussed by Dr. Delong.  · January 17, 2022 Left breast lumpectomy, grade 2 invasive ductal carcinoma measuring 30 mm with perineural invasion and microcalcifications identified adjacent to the clip and biopsy site change.  Focal minimal associated atypical ductal hyperplasia but no definite in situ carcinoma is identified all margins are free of tumor closest margin being 2.8 mm.  Incidental fibroadenoma 2.6 mm maximally with focal fat necrosis.  0 of 1 sentinel lymph node negative.  Right breast mammoplasty was benign  · February 25, 2022: Patient already seen by radiation  oncology with plans to start radiation.  · Oncotype DX testing shows score of 14, her benefit from chemo is less than 1%  · We will start aromatase inhibitor to Femara. Explained in length side effects of Femara.     2.  Family history of malignancy:  · However patient has family history of breast cancer in her maternal grandmother and great-grandmother and colon cancer in her maternal grandmother, thyroid cancer in her mother and personal history of renal cell cancer.  · We then discussed about obtaining INVITAE genetic test today and has been ordered in our office for the 9 breast cancer gene with reflux to be 47 gene panel.  · INVITAE genetic test negative for 47 genes    3.  Hypertension currently on lisinopril followed by primary care physician.  Today mildly elevated blood pressure  · Stable    4.  History of diabetes mellitus currently on Metformin    5.  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  History of renal cell cancer s/p left nephrectomy without evidence of recurrence and at age 31    Plan  · Reviewed Oncotype DX testing, score of 14. No plans for chemo  · Start aromatase inhibitor Femara. Explained in length side effects  · Patient seen by radiation oncology with plans to start radiation  · Follow-up with me in 3 months with labs  · Obtain DEXA scan 1 week prior    Monitor high risk medication    I spent 30 total minutes, face-to-face, caring for Daysi today.  Greater than 50% of this time involved counseling and/or coordination of care as documented within this note.      MD Dr. Zarina Hernandez Dr., Dr.

## 2022-02-26 PROBLEM — F32.A DEPRESSION: Status: ACTIVE | Noted: 2022-02-26

## 2022-02-26 PROBLEM — C64.9 RENAL CELL CARCINOMA: Status: ACTIVE | Noted: 2022-02-26

## 2022-03-03 ENCOUNTER — OFFICE VISIT (OUTPATIENT)
Dept: RADIATION ONCOLOGY | Facility: HOSPITAL | Age: 62
End: 2022-03-03

## 2022-03-03 ENCOUNTER — APPOINTMENT (OUTPATIENT)
Dept: RADIATION ONCOLOGY | Facility: HOSPITAL | Age: 62
End: 2022-03-03

## 2022-03-03 VITALS — SYSTOLIC BLOOD PRESSURE: 154 MMHG | HEART RATE: 67 BPM | OXYGEN SATURATION: 95 % | DIASTOLIC BLOOD PRESSURE: 86 MMHG

## 2022-03-03 DIAGNOSIS — Z17.0 MALIGNANT NEOPLASM OF UPPER-INNER QUADRANT OF LEFT BREAST IN FEMALE, ESTROGEN RECEPTOR POSITIVE: Primary | ICD-10-CM

## 2022-03-03 DIAGNOSIS — C50.212 MALIGNANT NEOPLASM OF UPPER-INNER QUADRANT OF LEFT BREAST IN FEMALE, ESTROGEN RECEPTOR POSITIVE: Primary | ICD-10-CM

## 2022-03-03 PROCEDURE — 77370 RADIATION PHYSICS CONSULT: CPT | Performed by: RADIOLOGY

## 2022-03-03 PROCEDURE — 77333 RADIATION TREATMENT AID(S): CPT | Performed by: RADIOLOGY

## 2022-03-03 PROCEDURE — 77290 THER RAD SIMULAJ FIELD CPLX: CPT | Performed by: RADIOLOGY

## 2022-03-03 PROCEDURE — 77263 THER RADIOLOGY TX PLNG CPLX: CPT | Performed by: RADIOLOGY

## 2022-03-03 PROCEDURE — 99215 OFFICE O/P EST HI 40 MIN: CPT | Performed by: RADIOLOGY

## 2022-03-03 NOTE — PROGRESS NOTES
Subjective     No ref. provider found    Cancer Staging  No matching staging information was found for the patient.   Chief Complaint   Patient presents with   • Re-Evaluation     Breast CA      CC: left breast cancer pT2N0 ER [place] Pos Her 2 neg                                Dear Beth Delong MD     I had the pleasure of seeing Daysi Lugo  [facility].   The patient is a 62 y.o. female with recently diagnosed left breast cancer.  She presented with a palpable mass in the left breast in 2021.  She had a bilateral diagnostic mammogram and left breast ultrasound on 21 which revealed an irregular mass left breast 10 oclock corresponding to area of palpable concern measuring 2.4cm, birads 4.  She had an ultrasound guided biopsy on 21 which revealed invasive ductal carcinoma, grade II, 10mm in size, ER % WI % Her 2 negative and ki67 8%.       She had a breast mri on 21 which showed the biopsy proven malignancy left breast 10 oclock posterior one third measuring 2.8cm with central clip and no axillary or internal mammary lymphadenopathy and no suspicious findings in the right breast.      She underwent a left breast partial mastectomy and sentinel node biopsy on 22 with Dr. Delong with pathology revealing invasive ductal carcinoma, grade II, 30mm in size with perineural invasion.  All margins were negative with the closest inferior margin 2.8mm.  Additional margins were all negative with the final closest inferior margin 12.8mm.  One sentinel lymph node was negative.  Her pathologic stage was pT2N0.  oncotype Dx has been ordered and results are still pending.      She has a follow up with Dr. King with medical oncology in the next few days to review the oncoytpe test results and get final recomemndations.      She is  with menarche age 13.  She had a hysterectomy early 30s and has never taken hormone replacement therapy. She has a hx of  maternal grandmother with breast cancer as well as maternal great grandmother.  Her mother also had thyroid cancer.  The patient has a personal hx of renal cell carcinoma at age 31 at which time she underwent a left nephrectomy.  She had invitae genetic testing which was negative     Interval hx 3/3/22:  Her oncotype returned with a score of 14 with no benefit to chemotherapy.  She saw Dr. Vanegas on 2/25/22 and she has recommended femara.  She returns today for re-evaluation and treatment planning.  She has no new complaints.         Review of Systems   Constitutional: Negative.    Skin: Negative.    Psychiatric/Behavioral: Negative.          Past Medical History:   Diagnosis Date   • Anesthesia complication     SLOW TO WAKE UP   • Arthritis    • Breast cancer (HCC) 2021    Invasive ductal carcinoma of left breast    • History of kidney cancer     S/P right nephrectomy in 1991   • Hypertension    • Tumor, thyroid    • Type 2 diabetes mellitus (HCC)          Past Surgical History:   Procedure Laterality Date   • BACK SURGERY  1990   • BREAST BIOPSY Right    • BREAST LUMPECTOMY WITH SENTINEL NODE BIOPSY Left 1/17/2022    Procedure: Left ultrasound-guided partial mastectomy and sentinel lymph node biopsy;  Surgeon: Beth Delong MD;  Location: [facility];  Service: General;  Laterality: Left;   • BREAST RECONSTRUCTION Bilateral 1/17/2022    Procedure: Right breast reduction/mastopexy, left breast oncoplastic closure, bilateral chest liposuction;  Surgeon: Simon Castro MD;  Location: [facility];  Service: Plastics;  Laterality: Bilateral;   • COLONOSCOPY     • HYSTERECTOMY  1992   • KNEE SURGERY Right 2001    RECONSTRUCTION   • KNEE SURGERY Left 2003    RECONSTRUCTION   • NEPHRECTOMY Right 1991   • REDUCTION MAMMAPLASTY  2008   • THYROID BIOPSY Bilateral 2010         Social History     Socioeconomic History   • Marital status:      Spouse name: Lucas   • Number of children: 0   •  Highest education level: Bachelor's degree (e.g., BA, AB, BS)   Tobacco Use   • Smoking status: Former Smoker     Packs/day: 0.00     Years: 0.00     Pack years: 0.00     Types: Cigarettes     Quit date:      Years since quittin.1   • Smokeless tobacco: Never Used   Vaping Use   • Vaping Use: Never used   Substance and Sexual Activity   • Alcohol use: Yes     Alcohol/week: 0.0 standard drinks     Comment: rarely   • Drug use: Never   • Sexual activity: Defer         Family History   Problem Relation Age of Onset   • Diabetes type II Mother    • Hypertension Mother    • Thyroid cancer Mother    • Breast cancer Maternal Grandmother    • Colon cancer Maternal Grandmother    • Alzheimer's disease Maternal Grandfather    • Malig Hyperthermia Neg Hx           Objective    Physical Exam  Constitutional:       Appearance: Normal appearance.   Chest:          Comments: Incisions over left breast well healed, no palpable masses appreciated  Neurological:      Mental Status: She is alert.           Current Outpatient Medications on File Prior to Visit   Medication Sig Dispense Refill   • amLODIPine (NORVASC) 5 MG tablet Take 1 tablet by mouth Daily. 30 tablet 0   • Blood Glucose Monitoring Suppl (OneTouch Verio Reflect) w/Device kit See Admin Instructions.     • glucose blood test strip 1 each by Other route Daily. 25 each 2   • Lancets (freestyle) lancets 1 each by Other route Daily. 100 each 4   • letrozole (Femara) 2.5 MG tablet Take 1 tablet by mouth Daily for 30 days. 30 tablet 5   • lisinopril (PRINIVIL,ZESTRIL) 10 MG tablet Take 1 tablet by mouth Daily. 90 tablet 0   • metFORMIN ER (GLUCOPHAGE-XR) 500 MG 24 hr tablet Take 2 tablets by mouth 2 (Two) Times a Day With Meals. 360 tablet 1     No current facility-administered medications on file prior to visit.       ALLERGIES:    Allergies   Allergen Reactions   • Hydrocodone Anaphylaxis     PER PATIENT   • Penicillins Itching       /86   Pulse 67   LMP   (LMP Unknown)   SpO2 95%      Current Status 2/25/2022   ECOG score 0         Assessment/Plan   62 year old female with pT2N0 invasive ductal carcinoma of left breast ER [place] Pos Her 2 negative. I discussed with her my recommendation for post-operative radiation therapy to the left breast to decrease the risk of local recurrence.       I discussed with her in detail the risks, benefits and rationale of radiation therapy to the left breast to include but not limited to the following:     Acute: skin erythema, breakdown/moist desquamation, swelling or discomfort of the breast, fatigue, pneumonitis resulting in shortness of breath, cough or pain     Late: Permanent skin changes including hyperpigmentation, telangiectasias, fibrosis of the breast resulting in smaller size or poor cosmetic outcome, late edema or cellulitis, late rib fracture, late pulmonary fibrosis and the remote risk of late cardiac damage resulting in increased risk of heart attack or second malignancies.       She voiced understanding and was given an opportunity to ask questions which I believe were answered to her satisfaction. I have scheduled her to return in 3 weeks for CT simulation for treatment planning. I plan to treat the left breast with tangential fields to a dose of approximately 3990cGy in 15 fractions followed by a boost to the tumor bed for an additional 1000cGy in 5 fractions to start on Wednesday march 9.        I personally spent greater than 40 minutes today re-assessing her skin and incisions, managing, discussing and documenting my visit with the patient. That time includes review of records, imaging and pathology reports, obtaining my own history, performing a medically appropriate evaluation, counseling and educating the patient, discussing goals, logistics, alternatives and risks of my recommendations, surveillance options and potential outcomes. Discussing skin care during and after radiation and giving samples of different  creams. It also includes time spent explaining the simulation process, marking session and CT scan, as well as the daily treatments and what to expect.   It also includes the time documenting the clinical information in the EMR and communicating my recommendations to the other involved physicians.        Thank you very much for allowing me to participate in the care of this very pleasant patient.    Sincerely,      Lila Ann MD

## 2022-03-08 PROCEDURE — 77334 RADIATION TREATMENT AID(S): CPT | Performed by: RADIOLOGY

## 2022-03-08 PROCEDURE — 77295 3-D RADIOTHERAPY PLAN: CPT | Performed by: RADIOLOGY

## 2022-03-08 PROCEDURE — 77293 RESPIRATOR MOTION MGMT SIMUL: CPT | Performed by: RADIOLOGY

## 2022-03-08 PROCEDURE — 77300 RADIATION THERAPY DOSE PLAN: CPT | Performed by: RADIOLOGY

## 2022-03-09 ENCOUNTER — APPOINTMENT (OUTPATIENT)
Dept: RADIATION ONCOLOGY | Facility: HOSPITAL | Age: 62
End: 2022-03-09

## 2022-03-09 PROCEDURE — 77280 THER RAD SIMULAJ FIELD SMPL: CPT | Performed by: RADIOLOGY

## 2022-03-09 PROCEDURE — 77427 RADIATION TX MANAGEMENT X5: CPT | Performed by: RADIOLOGY

## 2022-03-09 PROCEDURE — 77412 RADIATION TX DELIVERY LVL 3: CPT | Performed by: RADIOLOGY

## 2022-03-10 ENCOUNTER — APPOINTMENT (OUTPATIENT)
Dept: RADIATION ONCOLOGY | Facility: HOSPITAL | Age: 62
End: 2022-03-10

## 2022-03-10 PROCEDURE — 77412 RADIATION TX DELIVERY LVL 3: CPT | Performed by: RADIOLOGY

## 2022-03-10 PROCEDURE — 77387 GUIDANCE FOR RADJ TX DLVR: CPT | Performed by: RADIOLOGY

## 2022-03-11 ENCOUNTER — APPOINTMENT (OUTPATIENT)
Dept: RADIATION ONCOLOGY | Facility: HOSPITAL | Age: 62
End: 2022-03-11

## 2022-03-11 PROCEDURE — 77412 RADIATION TX DELIVERY LVL 3: CPT | Performed by: RADIOLOGY

## 2022-03-11 PROCEDURE — 77387 GUIDANCE FOR RADJ TX DLVR: CPT | Performed by: RADIOLOGY

## 2022-03-14 ENCOUNTER — APPOINTMENT (OUTPATIENT)
Dept: RADIATION ONCOLOGY | Facility: HOSPITAL | Age: 62
End: 2022-03-14

## 2022-03-14 PROCEDURE — 77412 RADIATION TX DELIVERY LVL 3: CPT | Performed by: RADIOLOGY

## 2022-03-14 PROCEDURE — 77387 GUIDANCE FOR RADJ TX DLVR: CPT | Performed by: RADIOLOGY

## 2022-03-15 ENCOUNTER — APPOINTMENT (OUTPATIENT)
Dept: RADIATION ONCOLOGY | Facility: HOSPITAL | Age: 62
End: 2022-03-15

## 2022-03-15 ENCOUNTER — RADIATION ONCOLOGY WEEKLY ASSESSMENT (OUTPATIENT)
Dept: RADIATION ONCOLOGY | Facility: HOSPITAL | Age: 62
End: 2022-03-15

## 2022-03-15 VITALS
BODY MASS INDEX: 30.22 KG/M2 | SYSTOLIC BLOOD PRESSURE: 135 MMHG | OXYGEN SATURATION: 96 % | HEART RATE: 74 BPM | WEIGHT: 193 LBS | DIASTOLIC BLOOD PRESSURE: 85 MMHG

## 2022-03-15 DIAGNOSIS — C50.212 MALIGNANT NEOPLASM OF UPPER-INNER QUADRANT OF LEFT BREAST IN FEMALE, ESTROGEN RECEPTOR POSITIVE: Primary | ICD-10-CM

## 2022-03-15 DIAGNOSIS — Z17.0 MALIGNANT NEOPLASM OF UPPER-INNER QUADRANT OF LEFT BREAST IN FEMALE, ESTROGEN RECEPTOR POSITIVE: Primary | ICD-10-CM

## 2022-03-15 PROCEDURE — 77412 RADIATION TX DELIVERY LVL 3: CPT | Performed by: RADIOLOGY

## 2022-03-15 PROCEDURE — 77336 RADIATION PHYSICS CONSULT: CPT | Performed by: RADIOLOGY

## 2022-03-15 PROCEDURE — 77387 GUIDANCE FOR RADJ TX DLVR: CPT | Performed by: RADIOLOGY

## 2022-03-15 NOTE — PROGRESS NOTES
Physician Weekly Management Note    Diagnosis:     Diagnosis Plan   1. Malignant neoplasm of upper-inner quadrant of left breast in female, estrogen receptor positive (HCC)         RT Details:  fx 5/20 left breast     Notes on Treatment course, Films, Medical progress:  Doing well, no erythema, no pain, no fatigue, cont on     Weekly Management:  Medication reviewed?   Yes  New medications given?   No  Problemlist reviewed?   Yes  Problem added?   No  Issues raised requiring referral to support services - task assigned to:  na    Technical aspects reviewed:  Weekly OBI approved?   Yes  Weekly port films approved?   Yes  Change requests noted on port film?   No  Patient setup and plan reviewed?   Yes    Chart Reviewed:  Continue current treatment plan?   Yes  Treatment plan change requested?   No  CBC reviewed?   No  Concurrent Chemo?   No    Objective     Toxicities:   none     Review of Systems   Constitutional: Negative.    Skin: Negative.           Vitals:    03/15/22 0725   BP: 135/85   Pulse: 74   SpO2: 96%       Current Status 2/25/2022   ECOG score 0       Physical Exam  Chest:          Comments: No erythema  Neurological:      Mental Status: She is alert.             Problem Summary List    Diagnosis:     Diagnosis Plan   1. Malignant neoplasm of upper-inner quadrant of left breast in female, estrogen receptor positive (HCC)       Pathology:   Breast     Past Medical History:   Diagnosis Date   • Anesthesia complication     SLOW TO WAKE UP   • Arthritis    • Breast cancer (HCC) 2021    Invasive ductal carcinoma of left breast    • History of kidney cancer     S/P right nephrectomy in 1991   • Hypertension    • Tumor, thyroid    • Type 2 diabetes mellitus (HCC)          Past Surgical History:   Procedure Laterality Date   • BACK SURGERY  1990   • BREAST BIOPSY Right    • BREAST LUMPECTOMY WITH SENTINEL NODE BIOPSY Left 1/17/2022    Procedure: Left ultrasound-guided partial mastectomy and sentinel lymph node  biopsy;  Surgeon: Beth Delong MD;  Location: [facility] OR;  Service: General;  Laterality: Left;   • BREAST RECONSTRUCTION Bilateral 1/17/2022    Procedure: Right breast reduction/mastopexy, left breast oncoplastic closure, bilateral chest liposuction;  Surgeon: Simon Castro MD;  Location: [facility] OR;  Service: Plastics;  Laterality: Bilateral;   • COLONOSCOPY     • HYSTERECTOMY  1992   • KNEE SURGERY Right 2001    RECONSTRUCTION   • KNEE SURGERY Left 2003    RECONSTRUCTION   • NEPHRECTOMY Right 1991   • REDUCTION MAMMAPLASTY  2008   • THYROID BIOPSY Bilateral 2010         Current Outpatient Medications on File Prior to Visit   Medication Sig Dispense Refill   • amLODIPine (NORVASC) 5 MG tablet Take 1 tablet by mouth Daily. 30 tablet 0   • Blood Glucose Monitoring Suppl (OneTouch Verio Reflect) w/Device kit See Admin Instructions.     • glucose blood test strip 1 each by Other route Daily. 25 each 2   • Lancets (freestyle) lancets 1 each by Other route Daily. 100 each 4   • letrozole (Femara) 2.5 MG tablet Take 1 tablet by mouth Daily for 30 days. 30 tablet 5   • lisinopril (PRINIVIL,ZESTRIL) 10 MG tablet Take 1 tablet by mouth Daily. 90 tablet 0   • metFORMIN ER (GLUCOPHAGE-XR) 500 MG 24 hr tablet Take 2 tablets by mouth 2 (Two) Times a Day With Meals. 360 tablet 1     No current facility-administered medications on file prior to visit.       Allergies   Allergen Reactions   • Hydrocodone Anaphylaxis     PER PATIENT   • Penicillins Itching         Referring Provider:    No referring provider defined for this encounter.    Oncologist:  No primary care provider on file.      Seen and approved by:  Lila Ann MD  03/15/2022

## 2022-03-16 ENCOUNTER — APPOINTMENT (OUTPATIENT)
Dept: RADIATION ONCOLOGY | Facility: HOSPITAL | Age: 62
End: 2022-03-16

## 2022-03-16 PROCEDURE — 77387 GUIDANCE FOR RADJ TX DLVR: CPT | Performed by: RADIOLOGY

## 2022-03-16 PROCEDURE — 77427 RADIATION TX MANAGEMENT X5: CPT | Performed by: RADIOLOGY

## 2022-03-16 PROCEDURE — 77412 RADIATION TX DELIVERY LVL 3: CPT | Performed by: RADIOLOGY

## 2022-03-17 ENCOUNTER — APPOINTMENT (OUTPATIENT)
Dept: RADIATION ONCOLOGY | Facility: HOSPITAL | Age: 62
End: 2022-03-17

## 2022-03-17 PROCEDURE — 77412 RADIATION TX DELIVERY LVL 3: CPT | Performed by: RADIOLOGY

## 2022-03-17 PROCEDURE — 77387 GUIDANCE FOR RADJ TX DLVR: CPT | Performed by: RADIOLOGY

## 2022-03-18 ENCOUNTER — APPOINTMENT (OUTPATIENT)
Dept: RADIATION ONCOLOGY | Facility: HOSPITAL | Age: 62
End: 2022-03-18

## 2022-03-18 PROCEDURE — 77412 RADIATION TX DELIVERY LVL 3: CPT | Performed by: RADIOLOGY

## 2022-03-18 PROCEDURE — 77387 GUIDANCE FOR RADJ TX DLVR: CPT | Performed by: RADIOLOGY

## 2022-03-21 ENCOUNTER — APPOINTMENT (OUTPATIENT)
Dept: RADIATION ONCOLOGY | Facility: HOSPITAL | Age: 62
End: 2022-03-21

## 2022-03-21 ENCOUNTER — RADIATION ONCOLOGY WEEKLY ASSESSMENT (OUTPATIENT)
Dept: RADIATION ONCOLOGY | Facility: HOSPITAL | Age: 62
End: 2022-03-21

## 2022-03-21 VITALS — SYSTOLIC BLOOD PRESSURE: 129 MMHG | OXYGEN SATURATION: 97 % | DIASTOLIC BLOOD PRESSURE: 86 MMHG | HEART RATE: 65 BPM

## 2022-03-21 DIAGNOSIS — Z17.0 MALIGNANT NEOPLASM OF UPPER-INNER QUADRANT OF LEFT BREAST IN FEMALE, ESTROGEN RECEPTOR POSITIVE: Primary | ICD-10-CM

## 2022-03-21 DIAGNOSIS — C50.212 MALIGNANT NEOPLASM OF UPPER-INNER QUADRANT OF LEFT BREAST IN FEMALE, ESTROGEN RECEPTOR POSITIVE: Primary | ICD-10-CM

## 2022-03-21 PROCEDURE — 77412 RADIATION TX DELIVERY LVL 3: CPT | Performed by: RADIOLOGY

## 2022-03-21 PROCEDURE — 77387 GUIDANCE FOR RADJ TX DLVR: CPT | Performed by: RADIOLOGY

## 2022-03-21 NOTE — PROGRESS NOTES
Physician Weekly Management Note    Diagnosis:     Diagnosis Plan   1. Malignant neoplasm of upper-inner quadrant of left breast in female, estrogen receptor positive (HCC)         RT Details:  fx 9/20 left [id number]    Notes on Treatment course, Films, Medical progress:  Doing well, minimal erythema, mild fatigue, cont on     Weekly Management:  Medication reviewed?   Yes  New medications given?   No  Problemlist reviewed?   Yes  Problem added?   No  Issues raised requiring referral to support services - task assigned to:  na    Technical aspects reviewed:  Weekly OBI approved?   Yes  Weekly port films approved?   Yes  Change requests noted on port film?   No  Patient setup and plan reviewed?   Yes    Chart Reviewed:  Continue current treatment plan?   Yes  Treatment plan change requested?   No  CBC reviewed?   No  Concurrent Chemo?   No    Objective     Toxicities:   none     Review of Systems   Constitutional: Positive for fatigue.   Skin: Negative.           Vitals:    03/21/22 0818   BP: 129/86   Pulse: 65   SpO2: 97%       Current Status 2/25/2022   ECOG score 0       Physical Exam  Constitutional:       Appearance: Normal appearance.   Chest:          Comments: Minimal erythema over left breast  Neurological:      Mental Status: She is alert.             Problem Summary List    Diagnosis:     Diagnosis Plan   1. Malignant neoplasm of upper-inner quadrant of left breast in female, estrogen receptor positive (HCC)       Pathology:   breast    Past Medical History:   Diagnosis Date   • Anesthesia complication     SLOW TO WAKE UP   • Arthritis    • Breast cancer (HCC) 2021    Invasive ductal carcinoma of left breast    • History of kidney cancer     S/P right nephrectomy in 1991   • Hypertension    • Tumor, thyroid    • Type 2 diabetes mellitus (HCC)          Past Surgical History:   Procedure Laterality Date   • BACK SURGERY  1990   • BREAST BIOPSY Right    • BREAST LUMPECTOMY WITH SENTINEL NODE BIOPSY Left  1/17/2022    Procedure: Left ultrasound-guided partial mastectomy and sentinel lymph node biopsy;  Surgeon: Beth Delong MD;  Location: [facility] OR;  Service: General;  Laterality: Left;   • BREAST RECONSTRUCTION Bilateral 1/17/2022    Procedure: Right breast reduction/mastopexy, left breast oncoplastic closure, bilateral chest liposuction;  Surgeon: Simon Castro MD;  Location: [facility] OR;  Service: Plastics;  Laterality: Bilateral;   • COLONOSCOPY     • HYSTERECTOMY  1992   • KNEE SURGERY Right 2001    RECONSTRUCTION   • KNEE SURGERY Left 2003    RECONSTRUCTION   • NEPHRECTOMY Right 1991   • REDUCTION MAMMAPLASTY  2008   • THYROID BIOPSY Bilateral 2010         Current Outpatient Medications on File Prior to Visit   Medication Sig Dispense Refill   • amLODIPine (NORVASC) 5 MG tablet Take 1 tablet by mouth Daily. 30 tablet 0   • Blood Glucose Monitoring Suppl (OneTouch Verio Reflect) w/Device kit See Admin Instructions.     • glucose blood test strip 1 each by Other route Daily. 25 each 2   • Lancets (freestyle) lancets 1 each by Other route Daily. 100 each 4   • letrozole (Femara) 2.5 MG tablet Take 1 tablet by mouth Daily for 30 days. 30 tablet 5   • lisinopril (PRINIVIL,ZESTRIL) 10 MG tablet Take 1 tablet by mouth Daily. 90 tablet 0   • metFORMIN ER (GLUCOPHAGE-XR) 500 MG 24 hr tablet Take 2 tablets by mouth 2 (Two) Times a Day With Meals. 360 tablet 1     No current facility-administered medications on file prior to visit.       Allergies   Allergen Reactions   • Hydrocodone Anaphylaxis     PER PATIENT   • Penicillins Itching         Referring Provider:    No referring provider defined for this encounter.    Oncologist:  No primary care provider on file.      Seen and approved by:  Lila Ann MD  03/21/2022

## 2022-03-22 ENCOUNTER — OFFICE VISIT (OUTPATIENT)
Dept: FAMILY MEDICINE CLINIC | Facility: CLINIC | Age: 62
End: 2022-03-22

## 2022-03-22 ENCOUNTER — APPOINTMENT (OUTPATIENT)
Dept: RADIATION ONCOLOGY | Facility: HOSPITAL | Age: 62
End: 2022-03-22

## 2022-03-22 VITALS
WEIGHT: 194 LBS | BODY MASS INDEX: 30.45 KG/M2 | OXYGEN SATURATION: 99 % | HEART RATE: 66 BPM | DIASTOLIC BLOOD PRESSURE: 80 MMHG | TEMPERATURE: 98 F | HEIGHT: 67 IN | SYSTOLIC BLOOD PRESSURE: 140 MMHG

## 2022-03-22 DIAGNOSIS — I10 PRIMARY HYPERTENSION: Primary | ICD-10-CM

## 2022-03-22 DIAGNOSIS — E11.9 TYPE 2 DIABETES MELLITUS WITHOUT COMPLICATION, WITHOUT LONG-TERM CURRENT USE OF INSULIN: ICD-10-CM

## 2022-03-22 DIAGNOSIS — Z79.899 ENCOUNTER FOR LONG-TERM (CURRENT) USE OF MEDICATIONS: ICD-10-CM

## 2022-03-22 PROCEDURE — 77336 RADIATION PHYSICS CONSULT: CPT | Performed by: RADIOLOGY

## 2022-03-22 PROCEDURE — 77412 RADIATION TX DELIVERY LVL 3: CPT | Performed by: RADIOLOGY

## 2022-03-22 PROCEDURE — 77387 GUIDANCE FOR RADJ TX DLVR: CPT | Performed by: RADIOLOGY

## 2022-03-22 PROCEDURE — 99214 OFFICE O/P EST MOD 30 MIN: CPT | Performed by: FAMILY MEDICINE

## 2022-03-22 RX ORDER — AMLODIPINE BESYLATE 5 MG/1
5 TABLET ORAL DAILY
Qty: 90 TABLET | Refills: 1 | Status: SHIPPED | OUTPATIENT
Start: 2022-03-22 | End: 2022-06-27 | Stop reason: SDUPTHER

## 2022-03-22 RX ORDER — LISINOPRIL 10 MG/1
10 TABLET ORAL DAILY
Qty: 90 TABLET | Refills: 0 | Status: SHIPPED | OUTPATIENT
Start: 2022-03-22 | End: 2022-06-27 | Stop reason: SDUPTHER

## 2022-03-22 NOTE — PROGRESS NOTES
Chief Complaint  Hypertension and Diarrhea    Subjective          Daysi Luog presents to [facility]  Patient is here today for uncontrolled new onset hypertension.  She is currently taking lisinopril to 10 mg daily and amlodipine 5 mg daily.  Patient has not been monitoring home blood pressures and they are much better.  Patient states that she has no side effects and is feeling much better.    Patient is also here to follow up on new onset of type 2 diabetes.  She was diagnosed by a A1c of 8.0 on September 23, 2021.  She was started on metformin 1000mg BID.  She is tolerating the medication well and denies any side effects.        Diabetes  She has type 2 diabetes mellitus. No MedicAlert identification noted. The initial diagnosis of diabetes was made 5 months ago. Hypoglycemia symptoms include sleepiness and sweats. Pertinent negatives for hypoglycemia include no confusion, dizziness, headaches, hunger, mood changes, nervousness/anxiousness, pallor, seizures, speech difficulty or tremors. Associated symptoms include fatigue. Pertinent negatives for diabetes include no blurred vision, no chest pain, no foot paresthesias, no foot ulcerations, no polydipsia, no polyphagia, no polyuria, no visual change, no weakness and no weight loss. Pertinent negatives for hypoglycemia complications include no blackouts, no hospitalization, no nocturnal hypoglycemia, no required assistance and no required glucagon injection. Symptoms are stable. Pertinent negatives for diabetic complications include no CVA, heart disease, impotence, nephropathy, peripheral neuropathy, PVD or retinopathy. Risk factors for coronary artery disease include family history, hypertension and stress. Current diabetic treatment includes diet and oral agent (monotherapy). She is compliant with treatment all of the time. Her weight is decreasing steadily. She is following a generally healthy diet. She has not had a previous  "visit with a dietitian. She participates in exercise every other day. She monitors blood glucose at home 1-2 x per day. Blood glucose monitoring compliance is excellent. She does not see a podiatrist.Eye exam is not current.       Objective   Vital Signs:   /80   Pulse 66   Temp 98 °F (36.7 °C)   Ht 170.2 cm (67\")   Wt 88 kg (194 lb)   SpO2 99%   BMI 30.38 kg/m²            Physical Exam  Vitals and nursing note reviewed.   Constitutional:       Appearance: Normal appearance. She is well-developed. She is obese.   HENT:      Head: Normocephalic and atraumatic.      Right Ear: External ear normal.      Left Ear: External ear normal.      Nose: Nose normal.   Eyes:      General: No scleral icterus.     Conjunctiva/sclera: Conjunctivae normal.   Cardiovascular:      Rate and Rhythm: Normal rate and regular rhythm.      Heart sounds: Normal heart sounds.   Pulmonary:      Effort: Pulmonary effort is normal.      Breath sounds: Normal breath sounds.   Musculoskeletal:      Cervical back: Normal range of motion and neck supple.      Right lower leg: No edema.      Left lower leg: No edema.   Lymphadenopathy:      Cervical: No cervical adenopathy.   Skin:     General: Skin is warm and dry.      Findings: No rash.   Neurological:      Mental Status: She is alert and oriented to person, place, and time.   Psychiatric:         Mood and Affect: Mood normal.         Behavior: Behavior normal.         Thought Content: Thought content normal.         Judgment: Judgment normal.        Result Review :   The following data was reviewed by: Zarina Tineo DO on 03/22/2022:  Common labs    Common Labsle 2/9/22 2/9/22 2/23/22 2/25/22 2/25/22    0900 0900  0832 0832   Glucose  170 (A) 114 (A)  132 (A)   BUN  23 15  23 (A)   Creatinine  0.88 0.80  0.90   eGFR Non  Am  66 80  64   eGFR African Am   93     Sodium  139 144  141   Potassium  4.4 4.7  4.1   Chloride  102 103  103   Calcium  9.9 10.0  10.2   Albumin  4.50   " 4.60   Total Bilirubin  0.5   0.6   Alkaline Phosphatase  123 (A)   114   AST (SGOT)  15   13   ALT (SGPT)  21   25   WBC 7.33   7.47    Hemoglobin 14.0   14.1    Hematocrit 43.3   43.8    Platelets 297   216    (A) Abnormal value       Comments are available for some flowsheets but are not being displayed.           TSH    TSH 9/23/21   TSH 2.260                     Assessment and Plan    Diagnoses and all orders for this visit:    1. Primary hypertension (Primary)  -     Comprehensive Metabolic Panel  -     lisinopril (PRINIVIL,ZESTRIL) 10 MG tablet; Take 1 tablet by mouth Daily.  Dispense: 90 tablet; Refill: 0  -     amLODIPine (NORVASC) 5 MG tablet; Take 1 tablet by mouth Daily.  Dispense: 90 tablet; Refill: 1    2. Type 2 diabetes mellitus without complication, without long-term current use of insulin (HCC)  -     Hemoglobin A1c    3. Encounter for long-term (current) use of medications  -     Hemoglobin A1c  -     CBC & Differential  -     Comprehensive Metabolic Panel    Patient is here today for chronic stable type 2 diabetes and hypertension.  Surveillance labs were obtained today and any medication changes will be made based on lab results and will be called to the patient later this week.      Follow Up   Return in about 3 months (around 6/22/2022) for HTN, Diabetes.  Patient was given instructions and counseling regarding her condition or for health maintenance advice. Please see specific information pulled into the AVS if appropriate.       Answers for HPI/ROS submitted by the patient on 3/21/2022  What is the primary reason for your visit?: Diabetes

## 2022-03-23 ENCOUNTER — APPOINTMENT (OUTPATIENT)
Dept: RADIATION ONCOLOGY | Facility: HOSPITAL | Age: 62
End: 2022-03-23

## 2022-03-23 LAB
ALBUMIN SERPL-MCNC: 4.8 G/DL (ref 3.8–4.9)
ALBUMIN/GLOB SERPL: 2.2 {RATIO} (ref 1.2–2.2)
ALP SERPL-CCNC: 104 IU/L (ref 44–121)
ALT SERPL-CCNC: 20 IU/L (ref 0–32)
AST SERPL-CCNC: 15 IU/L (ref 0–40)
BASOPHILS # BLD AUTO: 0 X10E3/UL (ref 0–0.2)
BASOPHILS NFR BLD AUTO: 0 %
BILIRUB SERPL-MCNC: 0.5 MG/DL (ref 0–1.2)
BUN SERPL-MCNC: 19 MG/DL (ref 8–27)
BUN/CREAT SERPL: 23 (ref 12–28)
CALCIUM SERPL-MCNC: 10.5 MG/DL (ref 8.7–10.3)
CHLORIDE SERPL-SCNC: 100 MMOL/L (ref 96–106)
CO2 SERPL-SCNC: 25 MMOL/L (ref 20–29)
CREAT SERPL-MCNC: 0.82 MG/DL (ref 0.57–1)
EGFRCR SERPLBLD CKD-EPI 2021: 82 ML/MIN/1.73
EOSINOPHIL # BLD AUTO: 0.5 X10E3/UL (ref 0–0.4)
EOSINOPHIL NFR BLD AUTO: 7 %
ERYTHROCYTE [DISTWIDTH] IN BLOOD BY AUTOMATED COUNT: 13.5 % (ref 11.7–15.4)
GLOBULIN SER CALC-MCNC: 2.2 G/DL (ref 1.5–4.5)
GLUCOSE SERPL-MCNC: 116 MG/DL (ref 65–99)
HBA1C MFR BLD: 6.8 % (ref 4.8–5.6)
HCT VFR BLD AUTO: 44.6 % (ref 34–46.6)
HGB BLD-MCNC: 14.8 G/DL (ref 11.1–15.9)
IMM GRANULOCYTES # BLD AUTO: 0 X10E3/UL (ref 0–0.1)
IMM GRANULOCYTES NFR BLD AUTO: 0 %
LYMPHOCYTES # BLD AUTO: 1.3 X10E3/UL (ref 0.7–3.1)
LYMPHOCYTES NFR BLD AUTO: 19 %
MCH RBC QN AUTO: 29.7 PG (ref 26.6–33)
MCHC RBC AUTO-ENTMCNC: 33.2 G/DL (ref 31.5–35.7)
MCV RBC AUTO: 89 FL (ref 79–97)
MONOCYTES # BLD AUTO: 0.4 X10E3/UL (ref 0.1–0.9)
MONOCYTES NFR BLD AUTO: 5 %
NEUTROPHILS # BLD AUTO: 4.5 X10E3/UL (ref 1.4–7)
NEUTROPHILS NFR BLD AUTO: 69 %
PLATELET # BLD AUTO: 246 X10E3/UL (ref 150–450)
POTASSIUM SERPL-SCNC: 5 MMOL/L (ref 3.5–5.2)
PROT SERPL-MCNC: 7 G/DL (ref 6–8.5)
RBC # BLD AUTO: 4.99 X10E6/UL (ref 3.77–5.28)
SODIUM SERPL-SCNC: 140 MMOL/L (ref 134–144)
WBC # BLD AUTO: 6.6 X10E3/UL (ref 3.4–10.8)

## 2022-03-23 PROCEDURE — 77427 RADIATION TX MANAGEMENT X5: CPT | Performed by: RADIOLOGY

## 2022-03-23 PROCEDURE — 77412 RADIATION TX DELIVERY LVL 3: CPT | Performed by: RADIOLOGY

## 2022-03-23 PROCEDURE — 77387 GUIDANCE FOR RADJ TX DLVR: CPT | Performed by: RADIOLOGY

## 2022-03-24 ENCOUNTER — APPOINTMENT (OUTPATIENT)
Dept: RADIATION ONCOLOGY | Facility: HOSPITAL | Age: 62
End: 2022-03-24

## 2022-03-24 PROCEDURE — 77387 GUIDANCE FOR RADJ TX DLVR: CPT | Performed by: RADIOLOGY

## 2022-03-24 PROCEDURE — 77412 RADIATION TX DELIVERY LVL 3: CPT | Performed by: RADIOLOGY

## 2022-03-25 ENCOUNTER — APPOINTMENT (OUTPATIENT)
Dept: RADIATION ONCOLOGY | Facility: HOSPITAL | Age: 62
End: 2022-03-25

## 2022-03-25 PROCEDURE — 77412 RADIATION TX DELIVERY LVL 3: CPT | Performed by: RADIOLOGY

## 2022-03-25 PROCEDURE — 77387 GUIDANCE FOR RADJ TX DLVR: CPT | Performed by: RADIOLOGY

## 2022-03-28 ENCOUNTER — APPOINTMENT (OUTPATIENT)
Dept: RADIATION ONCOLOGY | Facility: HOSPITAL | Age: 62
End: 2022-03-28

## 2022-03-28 PROCEDURE — 77412 RADIATION TX DELIVERY LVL 3: CPT | Performed by: RADIOLOGY

## 2022-03-28 PROCEDURE — 77387 GUIDANCE FOR RADJ TX DLVR: CPT | Performed by: RADIOLOGY

## 2022-03-29 ENCOUNTER — APPOINTMENT (OUTPATIENT)
Dept: RADIATION ONCOLOGY | Facility: HOSPITAL | Age: 62
End: 2022-03-29

## 2022-03-29 ENCOUNTER — RADIATION ONCOLOGY WEEKLY ASSESSMENT (OUTPATIENT)
Dept: RADIATION ONCOLOGY | Facility: HOSPITAL | Age: 62
End: 2022-03-29

## 2022-03-29 VITALS
DIASTOLIC BLOOD PRESSURE: 78 MMHG | BODY MASS INDEX: 30.38 KG/M2 | HEART RATE: 66 BPM | OXYGEN SATURATION: 97 % | SYSTOLIC BLOOD PRESSURE: 143 MMHG | WEIGHT: 194 LBS

## 2022-03-29 DIAGNOSIS — C50.212 MALIGNANT NEOPLASM OF UPPER-INNER QUADRANT OF LEFT BREAST IN FEMALE, ESTROGEN RECEPTOR POSITIVE: Primary | ICD-10-CM

## 2022-03-29 DIAGNOSIS — Z17.0 MALIGNANT NEOPLASM OF UPPER-INNER QUADRANT OF LEFT BREAST IN FEMALE, ESTROGEN RECEPTOR POSITIVE: Primary | ICD-10-CM

## 2022-03-29 PROCEDURE — 77412 RADIATION TX DELIVERY LVL 3: CPT | Performed by: RADIOLOGY

## 2022-03-29 PROCEDURE — 77336 RADIATION PHYSICS CONSULT: CPT | Performed by: RADIOLOGY

## 2022-03-29 PROCEDURE — 77387 GUIDANCE FOR RADJ TX DLVR: CPT | Performed by: RADIOLOGY

## 2022-03-29 NOTE — PROGRESS NOTES
Physician Weekly Management Note    Diagnosis:     Diagnosis Plan   1. Malignant neoplasm of upper-inner quadrant of left breast in female, estrogen receptor positive (HCC)         RT Details:  Fx15/20 left breast 3990cGy    Notes on Treatment course, Films, Medical progress:  Doing well, mild erythema, mild fatigue, con  on    Weekly Management:  Medication reviewed?   Yes  New medications given?   No  Problemlist reviewed?   Yes  Problem added?   No  Issues raised requiring referral to support services - task assigned to:  na    Technical aspects reviewed:  Weekly OBI approved?   Yes  Weekly port films approved?   Yes  Change requests noted on port film?   No  Patient setup and plan reviewed?   Yes    Chart Reviewed:  Continue current treatment plan?   Yes  Treatment plan change requested?   No  CBC reviewed?   No  Concurrent Chemo?   No    Objective     Toxicities:   Grade 1 erythema/fatigue     Review of Systems   Constitutional: Positive for fatigue.          Vitals:    03/29/22 0804   BP: 143/78   Pulse: 66   SpO2: 97%       Current Status 2/25/2022   ECOG score 0       Physical Exam  Constitutional:       Appearance: Normal appearance.   Chest:          Comments: Mild erythema  Neurological:      Mental Status: She is alert.             Problem Summary List    Diagnosis:     Diagnosis Plan   1. Malignant neoplasm of upper-inner quadrant of left breast in female, estrogen receptor positive (HCC)       Pathology:   Breast     Past Medical History:   Diagnosis Date   • Anesthesia complication     SLOW TO WAKE UP   • Arthritis    • Breast cancer (HCC) 2021    Invasive ductal carcinoma of left breast    • History of kidney cancer     S/P right nephrectomy in 1991   • Hypertension    • Tumor, thyroid    • Type 2 diabetes mellitus (HCC)          Past Surgical History:   Procedure Laterality Date   • BACK SURGERY  1990   • BREAST BIOPSY Right    • BREAST LUMPECTOMY WITH SENTINEL NODE BIOPSY Left 1/17/2022     Procedure: Left ultrasound-guided partial mastectomy and sentinel lymph node biopsy;  Surgeon: Beth Delong MD;  Location: [facility];  Service: General;  Laterality: Left;   • BREAST RECONSTRUCTION Bilateral 1/17/2022    Procedure: Right breast reduction/mastopexy, left breast oncoplastic closure, bilateral chest liposuction;  Surgeon: Simon Castro MD;  Location: [facility] OR;  Service: Plastics;  Laterality: Bilateral;   • COLONOSCOPY     • HYSTERECTOMY  1992   • KNEE SURGERY Right 2001    RECONSTRUCTION   • KNEE SURGERY Left 2003    RECONSTRUCTION   • NEPHRECTOMY Right 1991   • REDUCTION MAMMAPLASTY  2008   • THYROID BIOPSY Bilateral 2010         Current Outpatient Medications on File Prior to Visit   Medication Sig Dispense Refill   • amLODIPine (NORVASC) 5 MG tablet Take 1 tablet by mouth Daily. 90 tablet 1   • Blood Glucose Monitoring Suppl (OneTouch Verio Reflect) w/Device kit See Admin Instructions.     • glucose blood test strip 1 each by Other route Daily. 25 each 2   • Lancets (freestyle) lancets 1 each by Other route Daily. 100 each 4   • lisinopril (PRINIVIL,ZESTRIL) 10 MG tablet Take 1 tablet by mouth Daily. 90 tablet 0   • metFORMIN ER (GLUCOPHAGE-XR) 500 MG 24 hr tablet Take 2 tablets by mouth 2 (Two) Times a Day With Meals. 360 tablet 1     No current facility-administered medications on file prior to visit.       Allergies   Allergen Reactions   • Hydrocodone Anaphylaxis     PER PATIENT   • Penicillins Itching         Referring Provider:    No referring provider defined for this encounter.    Oncologist:  No primary care provider on file.      Seen and approved by:  Lila Ann MD  03/29/2022

## 2022-03-30 ENCOUNTER — APPOINTMENT (OUTPATIENT)
Dept: RADIATION ONCOLOGY | Facility: HOSPITAL | Age: 62
End: 2022-03-30

## 2022-03-30 PROCEDURE — 77427 RADIATION TX MANAGEMENT X5: CPT | Performed by: RADIOLOGY

## 2022-03-30 PROCEDURE — 77280 THER RAD SIMULAJ FIELD SMPL: CPT | Performed by: RADIOLOGY

## 2022-03-30 PROCEDURE — 77412 RADIATION TX DELIVERY LVL 3: CPT | Performed by: RADIOLOGY

## 2022-03-31 ENCOUNTER — APPOINTMENT (OUTPATIENT)
Dept: RADIATION ONCOLOGY | Facility: HOSPITAL | Age: 62
End: 2022-03-31

## 2022-03-31 PROCEDURE — 77387 GUIDANCE FOR RADJ TX DLVR: CPT | Performed by: RADIOLOGY

## 2022-03-31 PROCEDURE — 77412 RADIATION TX DELIVERY LVL 3: CPT | Performed by: RADIOLOGY

## 2022-04-01 ENCOUNTER — APPOINTMENT (OUTPATIENT)
Dept: RADIATION ONCOLOGY | Facility: HOSPITAL | Age: 62
End: 2022-04-01

## 2022-04-01 PROCEDURE — 77412 RADIATION TX DELIVERY LVL 3: CPT | Performed by: RADIOLOGY

## 2022-04-01 PROCEDURE — 77387 GUIDANCE FOR RADJ TX DLVR: CPT | Performed by: RADIOLOGY

## 2022-04-04 ENCOUNTER — APPOINTMENT (OUTPATIENT)
Dept: RADIATION ONCOLOGY | Facility: HOSPITAL | Age: 62
End: 2022-04-04

## 2022-04-04 PROCEDURE — 77412 RADIATION TX DELIVERY LVL 3: CPT | Performed by: RADIOLOGY

## 2022-04-04 PROCEDURE — 77387 GUIDANCE FOR RADJ TX DLVR: CPT | Performed by: RADIOLOGY

## 2022-04-05 ENCOUNTER — RADIATION ONCOLOGY WEEKLY ASSESSMENT (OUTPATIENT)
Dept: RADIATION ONCOLOGY | Facility: HOSPITAL | Age: 62
End: 2022-04-05

## 2022-04-05 ENCOUNTER — DOCUMENTATION (OUTPATIENT)
Dept: RADIATION ONCOLOGY | Facility: HOSPITAL | Age: 62
End: 2022-04-05

## 2022-04-05 ENCOUNTER — APPOINTMENT (OUTPATIENT)
Dept: RADIATION ONCOLOGY | Facility: HOSPITAL | Age: 62
End: 2022-04-05

## 2022-04-05 DIAGNOSIS — Z17.0 MALIGNANT NEOPLASM OF UPPER-INNER QUADRANT OF LEFT BREAST IN FEMALE, ESTROGEN RECEPTOR POSITIVE: Primary | ICD-10-CM

## 2022-04-05 DIAGNOSIS — C50.212 MALIGNANT NEOPLASM OF UPPER-INNER QUADRANT OF LEFT BREAST IN FEMALE, ESTROGEN RECEPTOR POSITIVE: Primary | ICD-10-CM

## 2022-04-05 PROCEDURE — 77336 RADIATION PHYSICS CONSULT: CPT | Performed by: RADIOLOGY

## 2022-04-05 PROCEDURE — 77412 RADIATION TX DELIVERY LVL 3: CPT | Performed by: RADIOLOGY

## 2022-04-05 PROCEDURE — 77387 GUIDANCE FOR RADJ TX DLVR: CPT | Performed by: RADIOLOGY

## 2022-04-05 NOTE — PROGRESS NOTES
++Physician Weekly Management Note    Diagnosis:     Diagnosis Plan   1. Malignant neoplasm of upper-inner quadrant of left breast in female, estrogen receptor positive (HCC)         RT Details:  Treatment #20/20    Notes on Treatment course, Films, Medical progress:  Moderate erythema in the treatment portal on exam -  all incisions are well-healed.  Patient reports fatigue, intermittent nipple soreness.  Otherwise has done well.  She will be seeing Dr. Ann back in routine first follow-up in 4 weeks.      [facility] ECOG Status: (0) Fully active, able to carry on all predisease performance without restriction      Weekly Management:  Medication reviewed?   Yes  New medications given?   No  Problemlist reviewed?   Yes  Problem added?   No      Technical aspects reviewed:  Weekly OBI approved?   Yes  Weekly port films approved?   Yes  Change requests noted on port film?   No  Patient setup and plan reviewed?   Yes    Chart Reviewed:  Continue current treatment plan?   Yes  Treatment plan change requested?   No  CBC reviewed?   No  Concurrent Chemo?   No    Objective     Toxicities:   As above     Review of Systems   As above    There were no vitals filed for this visit.    Current Status 2/25/2022   ECOG score 0       Physical Exam  As above      Problem Summary List    Diagnosis:     Diagnosis Plan   1. Malignant neoplasm of upper-inner quadrant of left breast in female, estrogen receptor positive (HCC)       Pathology:       Past Medical History:   Diagnosis Date   • Anesthesia complication     SLOW TO WAKE UP   • Arthritis    • Breast cancer (HCC) 2021    Invasive ductal carcinoma of left breast    • History of kidney cancer     S/P right nephrectomy in 1991   • Hypertension    • Tumor, thyroid    • Type 2 diabetes mellitus (HCC)          Past Surgical History:   Procedure Laterality Date   • BACK SURGERY  1990   • BREAST BIOPSY Right    • BREAST LUMPECTOMY WITH SENTINEL NODE BIOPSY Left 1/17/2022     Procedure: Left ultrasound-guided partial mastectomy and sentinel lymph node biopsy;  Surgeon: Beth Delong MD;  Location: [facility];  Service: General;  Laterality: Left;   • BREAST RECONSTRUCTION Bilateral 1/17/2022    Procedure: Right breast reduction/mastopexy, left breast oncoplastic closure, bilateral chest liposuction;  Surgeon: Simon Castro MD;  Location: [facility];  Service: Plastics;  Laterality: Bilateral;   • COLONOSCOPY     • HYSTERECTOMY  1992   • KNEE SURGERY Right 2001    RECONSTRUCTION   • KNEE SURGERY Left 2003    RECONSTRUCTION   • NEPHRECTOMY Right 1991   • REDUCTION MAMMAPLASTY  2008   • THYROID BIOPSY Bilateral 2010         Current Outpatient Medications on File Prior to Visit   Medication Sig Dispense Refill   • amLODIPine (NORVASC) 5 MG tablet Take 1 tablet by mouth Daily. 90 tablet 1   • Blood Glucose Monitoring Suppl (OneTouch Verio Reflect) w/Device kit See Admin Instructions.     • glucose blood test strip 1 each by Other route Daily. 25 each 2   • Lancets (freestyle) lancets 1 each by Other route Daily. 100 each 4   • lisinopril (PRINIVIL,ZESTRIL) 10 MG tablet Take 1 tablet by mouth Daily. 90 tablet 0   • metFORMIN ER (GLUCOPHAGE-XR) 500 MG 24 hr tablet Take 2 tablets by mouth 2 (Two) Times a Day With Meals. 360 tablet 1     No current facility-administered medications on file prior to visit.       Allergies   Allergen Reactions   • Hydrocodone Anaphylaxis     PER PATIENT   • Penicillins Itching        Primary care MD:    Zarina Tineo DO    Oncologist:  Patient Care Team:  Marisa King MD as Consulting Physician (Hematology and Oncology)  Simon Castro MD as Consulting Physician (Plastic Surgery)       Seen and approved by:  Tru Sykes MD  04/05/2022

## 2022-04-19 NOTE — PROGRESS NOTES
"Chief Complaint  Follow-up (/3 MONTH F/U BREAST RECON )    Subjective          History of Present Illness  Daysi Lugo is a 62 y.o. female who presents to [facility] for pre op for  Bilateral breast reconstruction with Dr. Delong on 01/17/2022 and had bilateral breast reconstruction with reduction on the right and left oncoplastic closure on the left.   Center of the right breast has a hard area. At time can be painful .         Allergies: Hydrocodone and Penicillins  Allergies Reconciled.    Review of Systems   All reviewed and negative except the ones above    Objective     /98 (BP Location: Right arm, Patient Position: Sitting)   Pulse 81   Temp 98.2 °F (36.8 °C) (Temporal)   Ht 170.2 cm (67\")   Wt 88.5 kg (195 lb)   SpO2 96%   BMI 30.54 kg/m²     Body mass index is 30.54 kg/m².    Physical Exam     Awake, alert, oriented  Respiration is non elaborated  Non tachycardic    incisions are c/d/i  Enlarged lateral breast and induration on the upper breast pole with upper pole emptiness.   Left breast radiation noted.       Result Review :   The following data was reviewed by: Simon Castro MD on 01/10/2022:         Assessment and Plan      Diagnoses and all orders for this visit:    1. Malignant neoplasm of upper-inner quadrant of left breast in female, estrogen receptor positive (HCC) (Primary)    2. Postoperative follow-up    3. Deformity and disproportion of reconstructed breast        Plan:  I explained to patient we will proceed with breast shape improvement with bilateral lateral breast skin tailoring, upper breast fat graft and possible breast induration resection.    98898-39 revision of reconstructed breast    I spent 15 minutes caring for Daysi on this date of service. This time includes time spent by me in the following activities:obtaining and/or reviewing a separately obtained history, performing a medically appropriate " examination and/or evaluation , counseling and educating the patient/family/caregiver and documenting information in the medical record        Follow Up     No follow-ups on file.    Patient was given instructions and counseling regarding her condition. Please see specific information pulled into the AVS if appropriate.     Simon Castro MD  04/25/2022

## 2022-04-23 ENCOUNTER — HOSPITAL ENCOUNTER (OUTPATIENT)
Dept: DIABETES SERVICES | Facility: HOSPITAL | Age: 62
Discharge: HOME | End: 2022-04-23
Admitting: FAMILY MEDICINE

## 2022-04-23 PROCEDURE — G0109 DIAB MANAGE TRN IND/GROUP: HCPCS

## 2022-04-23 NOTE — CONSULTS
Pt was seen today by RN Diabetes Educator and Registered Dietitian for the quarterly Diabetes Education Class. Comprehensive American Diabetes Association assessment will be sent to medical records to attach to this encounter.     Pt has been encouraged to call our office with questions or additional education needs. Please place referral for additional services or followup should need arise. Thank you for the referral.

## 2022-04-25 ENCOUNTER — PREP FOR SURGERY (OUTPATIENT)
Dept: OTHER | Facility: HOSPITAL | Age: 62
End: 2022-04-25

## 2022-04-25 ENCOUNTER — OFFICE VISIT (OUTPATIENT)
Dept: PLASTIC SURGERY | Facility: CLINIC | Age: 62
End: 2022-04-25

## 2022-04-25 VITALS
HEART RATE: 81 BPM | WEIGHT: 195 LBS | HEIGHT: 67 IN | SYSTOLIC BLOOD PRESSURE: 148 MMHG | TEMPERATURE: 98.2 F | DIASTOLIC BLOOD PRESSURE: 98 MMHG | BODY MASS INDEX: 30.61 KG/M2 | OXYGEN SATURATION: 96 %

## 2022-04-25 DIAGNOSIS — N65.0 DEFORMITY AND DISPROPORTION OF RECONSTRUCTED BREAST: ICD-10-CM

## 2022-04-25 DIAGNOSIS — Z17.0 MALIGNANT NEOPLASM OF UPPER-INNER QUADRANT OF LEFT BREAST IN FEMALE, ESTROGEN RECEPTOR POSITIVE: Primary | ICD-10-CM

## 2022-04-25 DIAGNOSIS — N65.1 DEFORMITY AND DISPROPORTION OF RECONSTRUCTED BREAST: ICD-10-CM

## 2022-04-25 DIAGNOSIS — C50.212 MALIGNANT NEOPLASM OF UPPER-INNER QUADRANT OF LEFT BREAST IN FEMALE, ESTROGEN RECEPTOR POSITIVE: Primary | ICD-10-CM

## 2022-04-25 DIAGNOSIS — Z09 POSTOPERATIVE FOLLOW-UP: ICD-10-CM

## 2022-04-25 DIAGNOSIS — N65.1 DEFORMITY AND DISPROPORTION OF RECONSTRUCTED BREAST: Primary | ICD-10-CM

## 2022-04-25 DIAGNOSIS — N65.0 DEFORMITY AND DISPROPORTION OF RECONSTRUCTED BREAST: Primary | ICD-10-CM

## 2022-04-25 PROCEDURE — 99213 OFFICE O/P EST LOW 20 MIN: CPT | Performed by: SURGERY

## 2022-04-25 RX ORDER — CLINDAMYCIN PHOSPHATE 900 MG/50ML
900 INJECTION INTRAVENOUS ONCE
Status: CANCELLED | OUTPATIENT
Start: 2022-10-26 | End: 2022-04-25

## 2022-05-04 ENCOUNTER — TELEPHONE (OUTPATIENT)
Dept: SURGERY | Facility: CLINIC | Age: 62
End: 2022-05-04

## 2022-05-04 ENCOUNTER — OFFICE VISIT (OUTPATIENT)
Dept: SURGERY | Facility: CLINIC | Age: 62
End: 2022-05-04

## 2022-05-04 ENCOUNTER — OFFICE VISIT (OUTPATIENT)
Dept: RADIATION ONCOLOGY | Facility: HOSPITAL | Age: 62
End: 2022-05-04

## 2022-05-04 ENCOUNTER — OFFICE VISIT (OUTPATIENT)
Dept: OTHER | Facility: HOSPITAL | Age: 62
End: 2022-05-04

## 2022-05-04 ENCOUNTER — HOSPITAL ENCOUNTER (OUTPATIENT)
Dept: PHYSICAL THERAPY | Facility: HOSPITAL | Age: 62
Discharge: HOME | End: 2022-05-04

## 2022-05-04 VITALS
WEIGHT: 193.3 LBS | SYSTOLIC BLOOD PRESSURE: 133 MMHG | RESPIRATION RATE: 18 BRPM | TEMPERATURE: 98 F | HEART RATE: 71 BPM | DIASTOLIC BLOOD PRESSURE: 85 MMHG | OXYGEN SATURATION: 96 % | BODY MASS INDEX: 30.28 KG/M2

## 2022-05-04 VITALS
WEIGHT: 194.6 LBS | BODY MASS INDEX: 30.48 KG/M2 | OXYGEN SATURATION: 97 % | SYSTOLIC BLOOD PRESSURE: 160 MMHG | HEART RATE: 67 BPM | DIASTOLIC BLOOD PRESSURE: 99 MMHG | TEMPERATURE: 98.3 F | RESPIRATION RATE: 16 BRPM

## 2022-05-04 VITALS
WEIGHT: 194 LBS | DIASTOLIC BLOOD PRESSURE: 87 MMHG | SYSTOLIC BLOOD PRESSURE: 146 MMHG | BODY MASS INDEX: 30.45 KG/M2 | HEART RATE: 88 BPM | HEIGHT: 67 IN

## 2022-05-04 DIAGNOSIS — Z17.0 MALIGNANT NEOPLASM OF UPPER-INNER QUADRANT OF LEFT BREAST IN FEMALE, ESTROGEN RECEPTOR POSITIVE: Primary | ICD-10-CM

## 2022-05-04 DIAGNOSIS — C50.212 MALIGNANT NEOPLASM OF UPPER-INNER QUADRANT OF LEFT BREAST IN FEMALE, ESTROGEN RECEPTOR POSITIVE: Primary | ICD-10-CM

## 2022-05-04 DIAGNOSIS — Z98.890 S/P LUMPECTOMY, LEFT BREAST: ICD-10-CM

## 2022-05-04 DIAGNOSIS — Z91.89 AT RISK FOR LYMPHEDEMA: ICD-10-CM

## 2022-05-04 PROCEDURE — 99215 OFFICE O/P EST HI 40 MIN: CPT | Performed by: NURSE PRACTITIONER

## 2022-05-04 PROCEDURE — 93702 BIS XTRACELL FLUID ANALYSIS: CPT

## 2022-05-04 PROCEDURE — 99212-NC PR NO CHARGE CBC OFFICE OUTPATIENT VISIT 10 MINUTES: Performed by: RADIOLOGY

## 2022-05-04 PROCEDURE — 99214 OFFICE O/P EST MOD 30 MIN: CPT | Performed by: NURSE PRACTITIONER

## 2022-05-04 NOTE — PROGRESS NOTES
s    Subjective     No ref. provider found    Cancer Staging  No matching staging information was found for the patient.   No chief complaint on file.   4 week follow up                                S:    I had the pleasure of seeing Daysi Lugo  [facility].  She is a pleasant 62 year old female with pT2N0 invasive ductal carcinoma of left breast ER [place] Pos Her 2 negative. she completed adjuvant radiation to the left breast on 4/5/22.  She received a dose of 3990cGy in 15 fractions followed by a tumor bed boost for an additional 1000cgy in 5 fractions. She returns today for follow up now 4 weeks out from completion of her radiation therapy to the breast.  She has been doing well since I last saw her.      Review of Systems   Constitutional: Positive for fatigue.   Skin: Negative.          Past Medical History:   Diagnosis Date   • Anesthesia complication     SLOW TO WAKE UP   • Arthritis    • Breast cancer (HCC) 2021    Invasive ductal carcinoma of left breast    • History of kidney cancer     S/P right nephrectomy in 1991   • Hypertension    • Tumor, thyroid    • Type 2 diabetes mellitus (HCC)          Past Surgical History:   Procedure Laterality Date   • BACK SURGERY  1990   • BREAST BIOPSY Right    • BREAST LUMPECTOMY WITH SENTINEL NODE BIOPSY Left 1/17/2022    Procedure: Left ultrasound-guided partial mastectomy and sentinel lymph node biopsy;  Surgeon: Beth Delong MD;  Location: [facility];  Service: General;  Laterality: Left;   • BREAST RECONSTRUCTION Bilateral 1/17/2022    Procedure: Right breast reduction/mastopexy, left breast oncoplastic closure, bilateral chest liposuction;  Surgeon: Simon Castro MD;  Location: [facility];  Service: Plastics;  Laterality: Bilateral;   • COLONOSCOPY     • HYSTERECTOMY  1992   • KNEE SURGERY Right 2001    RECONSTRUCTION   • KNEE SURGERY Left 2003    RECONSTRUCTION   • NEPHRECTOMY Right 1991   • REDUCTION MAMMAPLASTY  2008    • THYROID BIOPSY Bilateral          Social History     Socioeconomic History   • Marital status:      Spouse name: Lucas   • Number of children: 0   • Highest education level: Bachelor's degree (e.g., BA, AB, BS)   Tobacco Use   • Smoking status: Former Smoker     Packs/day: 0.00     Years: 0.00     Pack years: 0.00     Types: Cigarettes     Quit date: 1991     Years since quittin.3   • Smokeless tobacco: Never Used   Vaping Use   • Vaping Use: Never used   Substance and Sexual Activity   • Alcohol use: Not Currently     Comment: rarely   • Drug use: Never   • Sexual activity: Not Currently     Partners: Male     Birth control/protection: Post-menopausal, None         Family History   Problem Relation Age of Onset   • Diabetes type II Mother    • Hypertension Mother    • Thyroid cancer Mother    • Breast cancer Maternal Grandmother    • Colon cancer Maternal Grandmother    • Diabetes Maternal Grandmother    • Stroke Maternal Grandmother    • Alzheimer's disease Maternal Grandfather    • Malig Hyperthermia Neg Hx           Objective    Physical Exam  Constitutional:       Appearance: Normal appearance.   Chest:          Comments: Minimal hyperpigmentation over left breast, no palpable masses in either breast  Neurological:      Mental Status: She is alert.           Current Outpatient Medications on File Prior to Visit   Medication Sig Dispense Refill   • amLODIPine (NORVASC) 5 MG tablet Take 1 tablet by mouth Daily. 90 tablet 1   • Blood Glucose Monitoring Suppl (OneTouch Verio Reflect) w/Device kit See Admin Instructions.     • glucose blood test strip 1 each by Other route Daily. 25 each 2   • Lancets (freestyle) lancets 1 each by Other route Daily. 100 each 4   • lisinopril (PRINIVIL,ZESTRIL) 10 MG tablet Take 1 tablet by mouth Daily. 90 tablet 0   • metFORMIN ER (GLUCOPHAGE-XR) 500 MG 24 hr tablet Take 2 tablets by mouth 2 (Two) Times a Day With Meals. 360 tablet 1     No current  facility-administered medications on file prior to visit.       ALLERGIES:    Allergies   Allergen Reactions   • Hydrocodone Anaphylaxis     PER PATIENT   • Penicillins Itching       /99   Pulse 67   Temp 98.3 °F (36.8 °C)   Resp 16   Wt 88.3 kg (194 lb 9.6 oz)   LMP  (LMP Unknown)   SpO2 97%   BMI 30.48 kg/m²      Current Status 2/25/2022   ECOG score 0         Assessment/Plan     62 year old female with pT2N0 invasive ductal carcinoma of left breast ER [place] Pos Her 2 negative. She will be due for her yearly mammogram in November 2022 and follow up with Dr. Delong shortly after.  She will have regular follow up with her medical oncologist.  I instructed her to go ahead and start her hormonal blockade. I will see her back in one year for follow up.  She knows to call for this appointment.             Thank you very much for allowing me to participate in the care of this very pleasant patient.    Sincerely,      Lila Ann MD

## 2022-05-04 NOTE — THERAPY RE-EVALUATION
Physical Therapy Lymphedema Re-Evaluation  [facility]     Patient Name: Daysi Lugo  : 1960  MRN: [id number]  Today's Date: 2022      Visit Date: 2022    Visit Dx:    ICD-10-CM ICD-9-CM   1. Malignant neoplasm of upper-inner quadrant of left breast in female, estrogen receptor positive (HCC)  C50.212 174.2    Z17.0 V86.0   2. S/P lumpectomy, left breast  Z98.890 V45.89   3. At risk for lymphedema  Z91.89 V49.89       Patient Active Problem List   Diagnosis   • Personal history of kidney cancer   • Malignant neoplasm of upper-inner quadrant of left breast in female, estrogen receptor positive (HCC)   • Family history of cancer   • Depression   • Renal cell carcinoma (HCC)   • Postoperative follow-up   • Deformity and disproportion of reconstructed breast        Past Medical History:   Diagnosis Date   • Anesthesia complication     SLOW TO WAKE UP   • Arthritis    • Breast cancer (HCC)     Invasive ductal carcinoma of left breast    • History of kidney cancer     S/P right nephrectomy in    • Hypertension    • Tumor, thyroid    • Type 2 diabetes mellitus (HCC)         Past Surgical History:   Procedure Laterality Date   • BACK SURGERY     • BREAST BIOPSY Right    • BREAST LUMPECTOMY WITH SENTINEL NODE BIOPSY Left 2022    Procedure: Left ultrasound-guided partial mastectomy and sentinel lymph node biopsy;  Surgeon: Beth Delong MD;  Location: [facility];  Service: General;  Laterality: Left;   • BREAST RECONSTRUCTION Bilateral 2022    Procedure: Right breast reduction/mastopexy, left breast oncoplastic closure, bilateral chest liposuction;  Surgeon: Simon Castro MD;  Location: [facility];  Service: Plastics;  Laterality: Bilateral;   • COLONOSCOPY     • HYSTERECTOMY     • KNEE SURGERY Right     RECONSTRUCTION   • KNEE SURGERY Left     RECONSTRUCTION   • NEPHRECTOMY Right    • REDUCTION MAMMAPLASTY     • THYROID BIOPSY  Bilateral 2010       Visit Dx:    ICD-10-CM ICD-9-CM   1. Malignant neoplasm of upper-inner quadrant of left breast in female, estrogen receptor positive (HCC)  C50.212 174.2    Z17.0 V86.0   2. S/P lumpectomy, left breast  Z98.890 V45.89   3. At risk for lymphedema  Z91.89 V49.89            Lymphedema     Row Name 05/04/22 1100             Subjective Pain    Able to rate subjective pain? yes  -LB      Pre-Treatment Pain Level 0  -LB      Subjective Pain Comment I am doing well. I am weaning from my sleeve. It is the last day I have to wear it.  -LB              Subjective Comments    Subjective Comments I am doing well. I am weaning from my sleeve. It is the last day I have to wear it.  -LB              L-Dex Bioimpedence Screening    L-Dex Measurement Extremity LUE  -LB      L-Dex Patient Position Standing  -LB      L-Dex UE Dominate Side Right  -LB      L-Dex UE At Risk Side Left  -LB      L-Dex UE Pre Surgical Value No  -LB      $ L-Dex Charge yes  -LB            User Key  (r) = Recorded By, (t) = Taken By, (c) = Cosigned By    Initials Name Provider Type    Shana Kitchen PT Physical Therapist                                Therapy Education  Education Details: reviewed compression garment for air travel, s/s of lymphedema, bioimpedance results and wear schedule, return to weight lifting discussion and progression  Given: Symptoms/condition management, Edema management, Posture/body mechanics  Program: Reinforced  How Provided: Verbal  Provided to: Patient  Level of Understanding: Verbalized  51246 - PT Self Care/Mgmt Minutes: 15       OP Exercises     Row Name 05/04/22 1100             Subjective Comments    Subjective Comments I am doing well. I am weaning from my sleeve. It is the last day I have to wear it.  -LB              Subjective Pain    Able to rate subjective pain? yes  -LB      Pre-Treatment Pain Level 0  -LB      Subjective Pain Comment I am doing well. I am weaning from my sleeve. It is the  last day I have to wear it.  -LB            User Key  (r) = Recorded By, (t) = Taken By, (c) = Cosigned By    Initials Name Provider Type    Shana Kitchen, PT Physical Therapist                             PT OP Goals     Row Name 05/04/22 1100          Long Term Goals    LTG Date to Achieve 03/19/22  -LB     LTG 1 Pt will acquire appropriately fitted compression garment and verbalize appropriate wear schedule.  -LB     LTG 1 Progress Met  -LB     LTG 1 Progress Comments weaning from garment, last day today  -LB     LTG 2 Pt will maintain LDex bioimpedance score WNL.  -LB     LTG 2 Progress Ongoing  -LB     LTG 3 Pt will report good tolerance to return to weight lifting.  -LB     LTG 3 Progress Ongoing  -LB           User Key  (r) = Recorded By, (t) = Taken By, (c) = Cosigned By    Initials Name Provider Type    Shana Kitchen, PT Physical Therapist                 PT Assessment/Plan     Row Name 05/04/22 1123          PT Assessment    Functional Limitations Other (comment)  at risk for lymphedema  -LB     Impairments Edema;Impaired lymphatic circulation  -LB     Assessment Comments Daysi Lugo is a 62 y.o. female who returns for 3 month f/u biompedance spectroscopy diagnostic assessment of lymphatics of the upper extremities due to increased risk of post lumpectomy lymphedema Left upper extremity due to history of s/p Left Lumpectomy with [place] Node Biopsy performed on 1/17/22 by surgeons Dr. Delong/Juno. Daysi Lugo   is at increased risk of post lumpectomy lymphedema syndrome Left Upper Extremity due to Radiation therapy Breast surgery Lymph Node Removal. Current L-Dex score is -4.0, which is WNL. Due to good progress, patient is to return in 6 months to reassess bioimpedance scoring unless otherwise indicated. Daysi Lugo was encouraged to contact me during this time with any questions, concerns or changes in symptoms.  -LB     Rehab Potential Good  -LB     Patient/caregiver participated  in establishment of treatment plan and goals Yes  -LB            PT Plan    PT Frequency Other (comment)  -LB     Predicted Duration of Therapy Intervention (PT) repeat bioimpedance in 6 months  -LB     Planned CPT's? PT RE-EVAL: 81304;PT THER PROC EA 15 MIN: 18375;PT THER ACT EA 15 MIN: 20156;PT MANUAL THERAPY EA 15 MIN: 75289;PT NEUROMUSC RE-EDUCATION EA 15 MIN: 75890;PT SELF CARE/HOME MGMT/TRAIN EA 15: 26416;PT BIS XTRACELL FLUID ANALYSIS: 12523  -LB     PT Plan Comments repeat bioimpedance in 6 months, return to weight lifting?  -LB           User Key  (r) = Recorded By, (t) = Taken By, (c) = Cosigned By    Initials Name Provider Type    Shana Kitchen, PT Physical Therapist                             Time Calculation:   Start Time: 1115  Stop Time: 1140  Time Calculation (min): 25 min  Total Timed Code Minutes- PT: 15 minute(s)  Timed Charges  97197 - PT Self Care/Mgmt Minutes: 15  Total Minutes  Timed Charges Total Minutes: 15   Total Minutes: 15   Therapy Charges for Today     Code Description Service Date Service Provider Modifiers Qty    [id number] HC PT BIS XTRACELL FLUID ANALYSIS 5/4/2022 Shana Craig, PT  1                    Shana Craig PT  5/4/2022

## 2022-05-04 NOTE — TELEPHONE ENCOUNTER
I called patient with the following appointments. Patient expressed v/u of appointment date and times.     Bilateral Screening [facility] ([facility])   10/14/22 @ 9:30 am, arrival time 9:15 am.     Follow Up w/ Dr. Delong: 10/20/22 @ 10:30 am

## 2022-05-04 NOTE — PROGRESS NOTES
[facility]     Referring Provider: Dr. Zarina Tineo     Chief complaint: breast cancer follow up visit     HPI:   12/29/21:  Ms. Daysi Lugo is a 60 yo woman, seen at the request of Dr. Zarina Tineo, for a new diagnosis of left breast cancer. The patient first noticed a lump in her upper inner left breast in October. It has not changed in size since she first noticed it and she denies any associated pain. Her work-up is detailed in the oncologic history below. Prior to this year, it had been a least 25 years since she had a mammogram. She has a past history of a bilateral breast reduction in 2008.     She was recently diagnosed with diabetes and her PCP is trying to get her blood sugar under control. Her most recent hemoglobin A1c on 12/16/21 was 8.5. She also has a personal history of kidney cancer, sp right nephrectomy. Her maternal grandmother had breast cancer and colon cancer. She denies any family history of ovarian cancer. She saw Dr. King earlier today prior to this appointment and Dr. King sent genetic testing.      2/2/22:  Seen by Dr Delong  She underwent left partial mastectomy and SLNB with oncoplastic closure and right reduction for symmetry on 1/17/22. See surgery & pathology details below in oncologic history. She has been recovering well and has no complaints.     5/4/22, Interval History:  She returns today for follow up with expected left breast discomfort s/p radiation treatments were completed in 4/2022.  She also right breast discomfort with central thickening, revision by Dr Castro planned in 10/22.  She will start femara today as instructed by Dr Ann.    Her  passed away in 2/22 from colon cancer, she continues to grieve.  He was sick with terminal cancer for 16 months and she was his caregiver.  She has cut back on her work demands, continues to work on her pHD.    On 4/25/22 she was seen by Dr Castro:  Center of the right breast has a hard area. At time can  be painful I explained to patient we will proceed with breast shape improvement with bilateral lateral breast skin tailoring, upper breast fat graft and possible breast induration resection    Her last clinic visit with Dr King was 2/25/22:  Oncotype DX testing shows score of 14, her benefit from chemo is less than 1%.  We will start aromatase enema to Femara. Explained in length side effects of Femara.     Seen in PT/LE clinic in 2/22 and again today:  Currently, negative s/s of lymphedema, infection, seroma, or axillary cording. We did complete a baseline post operative bioimpedance assessment, with current L-Dex score of -5.8 , which is WNL             Oncology/Hematology History   Malignant neoplasm of upper-inner quadrant of left breast in female, estrogen receptor positive (HCC)   11/8/2021 Initial Diagnosis    Malignant neoplasm of upper-inner quadrant of left breast in female, estrogen receptor positive (HCC)     11/9/2021 Imaging    Bilateral Diagnostic MMG with [facility] & Left Breast US ([facility]):  MMG:  Scattered fibroglandular densities. In the posterior one-third upper inner quadrant of the left breast at the site of palpable concern corresponding to the overlying triangular skin marker, there is an irregular mass that measures on the order of 2.6 x 2.5 x 2.3 cm. Internal coarse calcifications and area of fat necrosis are noted. I see no other dominant masses in either breast. No areas of architectural distortion are appreciated. There is no evidence for skin thickening, nipple retraction or axillary adenopathy.  US:  Targeted sonographic evaluation of the left breast was performed through the area of palpable concern which corresponds to the 10 o'clock position on the order of 5 cm from the nipple. At this location, there is an irregular hypoechoic mass with internal calcifications. The mass measures 2.4 x 1.9 x 1.4 cm. No detectable internal vascularity is appreciated. Posterior acoustic shadowing is  noted.  BI-RADS 4: Suspicious.     12/2/2021 Biopsy    Left Breast, US-Guided Biopsy ( [place]):    1. Left Breast, 10 O'clock, 5 cm from Nipple, Ultrasound-Guided Biopsies for a Mass: INVASIVE MAMMARY                CARCINOMA, NO SPECIAL TYPE (INVASIVE DUCTAL CARCINOMA).               A. Histologic grade: [place] histologic score II (tubules = 3, nuclei = 2, mitosis = 1).               B. Largest contiguous invasive focus measures 10 mm in a core.               C. No definitive in situ component identified.                       D. No lymphovascular nor perineural invasion identified.    ER+ (%, strong)  NC+ (%, strong)  HER2 negative (IHC 1+)  Ki-67 8%     12/23/2021 Imaging    Bilateral Breast MRI ( [place]):  Within the posterior one-third upper inner quadrant of the left breast centered at the 10 o'clock position on the order of 8 cm from nipple there is an irregular enhancing mass that measures on the order of 2.5 cm in anterior to posterior dimension, 2.8 cm in the mediolateral dimension and 2.1 cm in the superior-inferior dimension. A signal void is seen within the mass that corresponds to a bowtie-shaped metallic clip. This represents the biopsy-proven site of malignancy.   No other areas of abnormal enhancement or morphology are seen within the left breast. I see no evidence for abnormal left breast skin, nipple or chest wall enhancement.  There is no evidence for left axillary or internal mammary chain adenopathy.    There are no areas of abnormal enhancement or morphology in the right breast. I see no evidence for abnormal right breast skin, nipple or chest wall enhancement and there is no evidence for right axillary or internal mammary chain adenopathy.  BI-RADS 6: Known malignancy.     12/29/2021 Genetic Testing    [employer] Common Hereditary Cancers Panel (47 genes):    Negative     1/17/2022 Surgery    Left ultrasound-guided partial mastectomy and sentinel lymph node biopsy with oncoplastic  closure and right reduction for symmetry    1.  Breast, Left, Lumpectomy (88 grams):                A.  Invasive mammary carcinoma, no special type (ductal), [place] histologic grade II        (tubules = 3, nuclei = 2, mitoses = 1), measuring 30 mm maximally with associated         perineural invasion and microcalcifications identified adjacent to bow tie clip and biopsy site change.   B.  Focal  minimal associated atypical ductal hyperplasia; no definitive in situ carcinoma is identified.   C.  All margins are free of tumor (closest margin inferior at 2.8 mm).  D.  Incidental fibroadenoma 2.6 mm maximally.    E.  Focal fat necrosis.  F.  See synoptic template for complete details and final margin measurements.      2.  Breast, Left, Additional, Medial, Inferior and Lateral Margins, Inked and Oriented Excision:   A.  Benign breast parenchyma with focal organizing fat necrosis; the final medial inferior and lateral margins are free of tumor by an additional 10 mm.      3.  Breast, Left Additional Posterior Margin, Inked and Oriented Excision:                A.  Benign fibroadipose tissue and skeletal muscle; the final posterior margin is free of tumor and         by an additional 3 mm.      4.  Lymph Node, Left Axillary, [place] #1, Excision:                 A.  Benign lymph node (0/1).     5.  Breast, Right, Reduction Mammoplasty (369 grams):                 A.  Benign skin, subcutaneous tissue and breast parenchyma.      6.  Breast, Left, Additional Anterior Margin, Excision:                A.  Benign skin and subcutaneous tissue (tumor measures an estimated 20 mm from the overlying skin surface).     7.  Breast, Left, Reduction Mammoplasty (225 grams):                A.  Benign skin, subcutaneous and breast parenchyma.       Oncotype DX: 14     3/9/2022 - 4/5/2022 Radiation    Radiation OncologyTreatment Course:  Daysi Lugo received 4990 cGy in 20 fractions to LEFT breast.     4/6/2022 -  Hormonal Therapy  "   Femara         Review of Systems:  See interval history.       Medications:    Current Outpatient Medications:   •  amLODIPine (NORVASC) 5 MG tablet, Take 1 tablet by mouth Daily., Disp: 90 tablet, Rfl: 1  •  Blood Glucose Monitoring Suppl (OneTouch Verio Reflect) w/Device kit, See Admin Instructions., Disp: , Rfl:   •  glucose blood test strip, 1 each by Other route Daily., Disp: 25 each, Rfl: 2  •  Lancets (freestyle) lancets, 1 each by Other route Daily., Disp: 100 each, Rfl: 4  •  lisinopril (PRINIVIL,ZESTRIL) 10 MG tablet, Take 1 tablet by mouth Daily., Disp: 90 tablet, Rfl: 0  •  metFORMIN ER (GLUCOPHAGE-XR) 500 MG 24 hr tablet, Take 2 tablets by mouth 2 (Two) Times a Day With Meals., Disp: 360 tablet, Rfl: 1      Allergies   Allergen Reactions   • Hydrocodone Anaphylaxis     PER PATIENT   • Penicillins Itching       Family History   Problem Relation Age of Onset   • Diabetes type II Mother    • Hypertension Mother    • Thyroid cancer Mother    • Breast cancer Maternal Grandmother    • Colon cancer Maternal Grandmother    • Diabetes Maternal Grandmother    • Stroke Maternal Grandmother    • Alzheimer's disease Maternal Grandfather    • Malig Hyperthermia Neg Hx        PHYSICAL EXAMINATION:   Vitals:    05/04/22 1143   BP: 146/87   Pulse: 88      /87 (BP Location: Right arm)   Pulse 88   Ht 170.2 cm (67\")   Wt 88 kg (194 lb)   LMP  (LMP Unknown)   BMI 30.38 kg/m²     I reviewed physical exam, no changes noted    ECOG 0 - Asymptomatic  General: NAD, well appearing  Psych: a&o x3, normal mood and affect  Eyes: EOMI, no scleral icterus  ENMT: neck supple without masses or thyromegaly, mucous membranes moist  MSK: normal gait, normal ROM in bilateral shoulders  Lymph nodes: Well-healed left axillary incision.  Breast:   Right: Well-healed mastopexy incisions. Flaps and NAC healthy.  No visible abnormalities on inspection while seated, with arms raised or hands on hips. No masses, skin changes, or " nipple abnormalities.  Large thickening central aspect, noted by Dr Castro with revision planned.    Left: Well-healed mastopexy incisions. Flaps and NAC healthy.  No visible abnormalities on inspection while seated, with arms raised or hands on hips. No masses, skin changes, or nipple abnormalities.  Area of thickened tissue at 1100 periareolar region, 1.5 cm. c/w Radiation changes.        Assessment:   1)   62 y.o. F with a diagnosis of left breast cancer: Intermediate grade, invasive ductal carcinoma, ER/TN+, Her2 negative. She underwent left partial mastectomy and SLNB with oncoplastic closure and right reduction for symmetry on 1/17/22, pT2N0, anatomic stage IIA, prognostic stage IA.      Discussion:  Revision by Dr Castro at the end of 10/22 or early 11/22.  We will need to complete imaging prior to that.    We discussed scar massage and massage of left breast thickening in periareolar region that has occurred since RT completed.  She verbalized understanding.    We discussed her follow up which includes clinical breast exam every 6 months for 2 years, with mammogram annually then  mammogram and exam annually until 5 years from diagnosis.      Plan:  - continue follow up with Dr Ann   -continue PT/LE clinic follow up.  -Continue follow-up with Dr. King.  -Continue follow-up with Dr. Castro.  - screening mammogram with tomosynthesis in 6 months at [facility] followed by exam with Dr Delong  -She was instructed to call sooner with any questions, concerns or changes on BSE.    I spent 37 minutes caring for Ms Lugo on this date of service. This time includes time spent by me in the following activities: preparing for the visit, reviewing tests, obtaining and/or reviewing a separately obtained history, performing a medically appropriate examination and/or evaluation , counseling and educating the patient/family/caregiver, ordering medications, tests, or procedures and documenting information in the  medical record.      Maru Arambula, APRN      CC:  Dr. Zarina Tineo

## 2022-05-04 NOTE — PROGRESS NOTES
[facility] MULTIDISCIPLINARY CLINIC  SURVIVORSHIP VISIT: IN CLINIC  Survivorship Treatment Summary Initial Visit        Daysi Lugo is a pleasant 62 y.o. female being followed by Marisa King MD for PT2N0, anatomic stage IIa, prognostic stage Ia invasive ductal carcinoma of the left breast. Reviewed today in Multidisciplinary Clinic, for initial survivorship treatment summary visit.     HPI  Very pleasant 62-year-old patient who completed adjuvant radiation to the left breast about 4 weeks ago.  She reports the radiation treatment site continues to heal.  She continues to apply Aquaphor to the area daily.  She has no pain discomfort or swelling of the upper extremities.  She has full range of motion of both of her arms.  She is tolerating Femara fairly well.  She does feel she is having hot flashes but not necessarily above her baseline prior to treatment.     Of note she reports a few episodes of chest pain that began about a month ago.  She is usually at rest.  The last occurrence was Monday evening at home.  It is tightness that starts mid chest that she feels move through her chest into the back of her shoulder blades.  She does not think it was provoked by anything in particular and has not noted a pattern.  When she woke up the next morning that pain had completely resolved.  This is happened a handful of times.  She does not recall ever discussing it with her primary care or with Dr. King    TREATMENT HISTORY:     Oncology/Hematology History   Malignant neoplasm of upper-inner quadrant of left breast in female, estrogen receptor positive (HCC)   11/8/2021 Initial Diagnosis    Malignant neoplasm of upper-inner quadrant of left breast in female, estrogen receptor positive (HCC)     11/9/2021 Imaging    Bilateral Diagnostic MMG with Adolph & Left Breast US ([facility]):  MMG:  Scattered fibroglandular densities. In the posterior one-third upper inner quadrant of the left breast at the site of  palpable concern corresponding to the overlying triangular skin marker, there is an irregular mass that measures on the order of 2.6 x 2.5 x 2.3 cm. Internal coarse calcifications and area of fat necrosis are noted. I see no other dominant masses in either breast. No areas of architectural distortion are appreciated. There is no evidence for skin thickening, nipple retraction or axillary adenopathy.  US:  Targeted sonographic evaluation of the left breast was performed through the area of palpable concern which corresponds to the 10 o'clock position on the order of 5 cm from the nipple. At this location, there is an irregular hypoechoic mass with internal calcifications. The mass measures 2.4 x 1.9 x 1.4 cm. No detectable internal vascularity is appreciated. Posterior acoustic shadowing is noted.  BI-RADS 4: Suspicious.     12/2/2021 Biopsy    Left Breast[facility]    1. Left Breast, 10 O'clock, 5 cm from Nipple, Ultrasound-Guided Biopsies for a Mass: INVASIVE MAMMARY                CARCINOMA, NO SPECIAL TYPE (INVASIVE DUCTAL CARCINOMA).               A. Histologic grade: Cabot histologic score II (tubules = 3, nuclei = 2, mitosis = 1).               B. Largest contiguous invasive focus measures 10 mm in a core.               C. No definitive in situ component identified.                       D. No lymphovascular nor perineural invasion identified.    ER+ (%, strong)  SD+ (%, strong)  HER2 negative (IHC 1+)  Ki-67 8%     12/23/2021 Imaging    Bilateral Breast MRI ( [facility]):  Within the posterior one-third upper inner quadrant of the left breast centered at the 10 o'clock position on the order of 8 cm from nipple there is an irregular enhancing mass that measures on the order of 2.5 cm in anterior to posterior dimension, 2.8 cm in the mediolateral dimension and 2.1 cm in the superior-inferior dimension. A signal void is seen within the mass that corresponds to a bowtie-shaped  metallic clip. This represents the biopsy-proven site of malignancy.   No other areas of abnormal enhancement or morphology are seen within the left breast. I see no evidence for abnormal left breast skin, nipple or chest wall enhancement.  There is no evidence for left axillary or internal mammary chain adenopathy.    There are no areas of abnormal enhancement or morphology in the right breast. I see no evidence for abnormal right breast skin, nipple or chest wall enhancement and there is no evidence for right axillary or internal mammary chain adenopathy.  BI-RADS 6: Known malignancy.     12/29/2021 Genetic Testing    [employer] Common Hereditary Cancers Panel (47 genes):    Negative     1/17/2022 Surgery    Left ultrasound-guided partial mastectomy and sentinel lymph node biopsy with oncoplastic closure and right reduction for symmetry    1.  Breast, Left, Lumpectomy (88 grams):                A.  Invasive mammary carcinoma, no special type (ductal), Scroggins histologic grade II        (tubules = 3, nuclei = 2, mitoses = 1), measuring 30 mm maximally with associated         perineural invasion and microcalcifications identified adjacent to bow tie clip and biopsy site change.   B.  Focal  minimal associated atypical ductal hyperplasia; no definitive in situ carcinoma is identified.   C.  All margins are free of tumor (closest margin inferior at 2.8 mm).  D.  Incidental fibroadenoma 2.6 mm maximally.    E.  Focal fat necrosis.  F.  See synoptic template for complete details and final margin measurements.      2.  Breast, Left, Additional, Medial, Inferior and Lateral Margins, Inked and Oriented Excision:   A.  Benign breast parenchyma with focal organizing fat necrosis; the final medial inferior and lateral margins are free of tumor by an additional 10 mm.      3.  Breast, Left Additional Posterior Margin, Inked and Oriented Excision:                A.  Benign fibroadipose tissue and skeletal muscle; the final  posterior margin is free of tumor and         by an additional 3 mm.      4.  Lymph Node, Left Axillary, Ulmer #1, Excision:                 A.  Benign lymph node (0/1).     5.  Breast, Right, Reduction Mammoplasty (369 grams):                 A.  Benign skin, subcutaneous tissue and breast parenchyma.      6.  Breast, Left, Additional Anterior Margin, Excision:                A.  Benign skin and subcutaneous tissue (tumor measures an estimated 20 mm from the overlying skin surface).     7.  Breast, Left, Reduction Mammoplasty (225 grams):                A.  Benign skin, subcutaneous and breast parenchyma.       Oncotype DX: 14     3/9/2022 - 4/5/2022 Radiation    Radiation OncologyTreatment Course:  Daysi Lugo received 4990 cGy in 20 fractions to LEFT breast.     4/6/2022 -  Hormonal Therapy    Femara         Past Medical History:   Diagnosis Date   • Anesthesia complication     SLOW TO WAKE UP   • Arthritis    • Breast cancer (HCC) 2021    Invasive ductal carcinoma of left breast    • History of kidney cancer     S/P right nephrectomy in 1991   • Hypertension    • Tumor, thyroid    • Type 2 diabetes mellitus (HCC)        Past Surgical History:   Procedure Laterality Date   • BACK SURGERY  1990   • BREAST BIOPSY Right    • BREAST LUMPECTOMY WITH SENTINEL NODE BIOPSY Left 1/17/2022    Procedure: Left ultrasound-guided partial mastectomy and sentinel lymph node biopsy;  Surgeon: Beth Delong MD;  Location: [facility];  Service: General;  Laterality: Left;   • BREAST RECONSTRUCTION Bilateral 1/17/2022    Procedure: Right breast reduction/mastopexy, left breast oncoplastic closure, bilateral chest liposuction;  Surgeon: Simon Castro MD;  Location: [facility];  Service: Plastics;  Laterality: Bilateral;   • COLONOSCOPY     • HYSTERECTOMY  1992   • KNEE SURGERY Right 2001    RECONSTRUCTION   • KNEE SURGERY Left 2003    RECONSTRUCTION   • NEPHRECTOMY Right 1991   • REDUCTION MAMMAPLASTY      • THYROID BIOPSY Bilateral        Social History     Socioeconomic History   • Marital status:      Spouse name: Lucas   • Number of children: 0   • Highest education level: Bachelor's degree (e.g., BA, AB, BS)   Tobacco Use   • Smoking status: Former Smoker     Packs/day: 0.00     Years: 0.00     Pack years: 0.00     Types: Cigarettes     Quit date: 1991     Years since quittin.3   • Smokeless tobacco: Never Used   Vaping Use   • Vaping Use: Never used   Substance and Sexual Activity   • Alcohol use: Not Currently     Comment: rarely   • Drug use: Never   • Sexual activity: Not Currently     Partners: Male     Birth control/protection: Post-menopausal, None       No results found for: LDH, URICACID    Lab Results   Component Value Date    GLUCOSE 116 (H) 2022    BUN 19 2022    CREATININE 0.82 2022    EGFRIFNONA 64 2022    EGFRIFAFRI 93 2022    BCR 23 2022    K 5.0 2022    CO2 25 2022    CALCIUM 10.5 (H) 2022    PROTENTOTREF 7.0 2022    ALBUMIN 4.8 2022    LABIL2 2.2 2022    AST 15 2022    ALT 20 2022       CBC w/diff    CBC w/Diff 2/9/22 2/25/22 3/22/22   WBC 7.33 7.47 6.6   RBC 4.74 4.69 4.99   Hemoglobin 14.0 14.1 14.8   Hematocrit 43.3 43.8 44.6   MCV 91.4 93.4 89   MCH 29.5 30.1 29.7   MCHC 32.3 32.2 33.2   RDW 13.2 13.4 13.5   Platelets 297 216 246   Neutrophil Rel % 62.5 67.4 69   Immature Granulocyte Rel % 0.4 0.1    Lymphocyte Rel % 27.3 22.4 19   Monocyte Rel % 4.9 (A) 5.2 5   Eosinophil Rel % 4.5 4.6 7   Basophil Rel % 0.4 0.3 0   (A) Abnormal value              Allergies as of 2022 - Reviewed 2022   Allergen Reaction Noted   • Hydrocodone Anaphylaxis 2021   • Penicillins Itching 2021       MEDICATIONS:  Medication list reviewed today    Review of Systems   Constitutional: Positive for appetite change (decreased) and fatigue. Negative for activity change and unexpected  weight change.        Positive for hot flashes   Respiratory: Positive for chest tightness. Negative for shortness of breath.    Cardiovascular: Negative for chest pain and leg swelling.   Gastrointestinal: Positive for diarrhea. Negative for blood in stool and constipation.   Genitourinary: Negative for hematuria.   Musculoskeletal: Negative for arthralgias and myalgias.   Skin: Positive for color change (radiation treatment site).   Neurological: Negative for dizziness and light-headedness.   Psychiatric/Behavioral: Positive for decreased concentration and sleep disturbance (sleeps short period). Negative for dysphoric mood. The patient is not nervous/anxious.        /85   Pulse 71   Temp 98 °F (36.7 °C) (Temporal)   Resp 18   Wt 87.7 kg (193 lb 4.8 oz)   LMP  (LMP Unknown)   SpO2 96% Comment: room air  BMI 30.28 kg/m²     Wt Readings from Last 3 Encounters:   05/04/22 87.7 kg (193 lb 4.8 oz)   05/04/22 88 kg (194 lb)   05/04/22 88.3 kg (194 lb 9.6 oz)       Pain Score    05/04/22 1550   PainSc: 0-No pain       PHQ-9 Total Score: 4   GAD7 Total Score: 0  Distress Score: 2  Fatigue Severity Scale: 5.3      Physical Exam  Constitutional:       Appearance: Normal appearance. She is well-groomed.   HENT:      Head: Normocephalic and atraumatic.   Cardiovascular:      Rate and Rhythm: Normal rate and regular rhythm.   Pulmonary:      Effort: Pulmonary effort is normal.   Abdominal:      Palpations: Abdomen is soft.   Skin:     General: Skin is warm and dry.      Capillary Refill: Capillary refill takes less than 2 seconds.   Neurological:      Mental Status: She is alert and oriented to person, place, and time.   Psychiatric:         Attention and Perception: Attention and perception normal.         Mood and Affect: Mood and affect normal.         Speech: Speech normal.         Behavior: Behavior normal. Behavior is cooperative.         Thought Content: Thought content normal.         Cognition and Memory:  Cognition normal.         Judgment: Judgment normal.           Advance Care Planning     Patient does not have advance care planning complete, we do not have a copy on file    Patient has not designated a healthcare surrogate:     Brief discussion and written information provided regarding advance care planning and appropriateness for all healthy adults, choosing a healthcare surrogate, prior experiences with loved ones who have been seriously ill, and exploration of goals of care in the event of a sudden injury or illness. We reviewed each section of a [place] [facility] will form today. We discussed this can be formalized using two witnesses unrelated to her or notartized.    Information provided on upcoming Advance Care Planning classes offered virtually each month through the [facility]. Advised arrangements can be made to schedule an appointment with myself or another advance care planning facilitator at their convenience.          DISCUSSION HELD TODAY:   Discussed NCCN recommendations for all cancer survivors of 150 minutes/week moderate intensity exercise, achieve and maintain a healthy weight, plants-based whole-foods diet, avoid tobacco and second hand smoke, avoid alcohol or minimize alcohol intake - no more than 1 drink in a day for adults.    After review of the Survivorship Treatment Summary & Care Plan, the patient verbalized understanding of recommendations for follow-up. As outlined in the care plan, they were advised to continue with follow-up care in accordance with the NCCN surveillance guidelines while transitioning back to their primary care physician for continued general preventive and healthcare needs. We discussed the importance of healthy eating, exercise and weight management. We reviewed current guidelines for routine screening of other cancers.     A copy of the Survivorship Treatment Summary & Care Plan for Ms. Lugo was provided to and forwarded to the providers identified  on the care team.    Problems identified:  1. Occurrences of chest tightness started 1 month ago: Patient reports these episodes have happened only few times over the last month.  Last occurrence was Monday.  She has not noted a pattern or associated this with rest or activity.She denies any history of cardiovascular disease, she has not had any heart attack or stroke.  She is followed by her primary care office closely for management of diabetes.  She is prescribed amlodipine 5 mg daily and lisinopril 10 mg daily.   I recommended that she discuss this further with her primary care and Dr. King and have asked that if this were to occur again I would want her to be evaluated immediately.    2. Lymphedema: Patient denies any pain discomfort swelling or limitations in range of motion in either upper extremity.  She is established with the lymphedema clinic and has follow-up scheduled later this year.  3. Letrozole: Tolerating generally well.  Feels she has hot flashes but not sure that they are necessarily above baseline prior to treatment.  We did discuss pharmacologic management of hot flashes with treatment options typically either Effexor or gabapentin.  Encouraged her to discuss further with Dr. King if symptoms become burdensome.  We also reviewed other techniques for management including mind-body techniques-yoga, meditation.  4. Fatigue: The patient reports this is her fatigue concern.  Fatigue severity scale 5.3 today which is moderately elevated.  We discussed that her fatigue to be gradually improving with time and not worsening.  We discussed the importance of physical activity for management.  [place] for walking.  She also uses her elliptical machine.  5. Psychosocial: Patient screens negative for symptoms of depression and anxiety today.  We did review peer based support available at [employer]'s club to include support groups, wellness offerings, social  opportunities.      Plan and recommendations:  1. I have asked her to review her symptoms of chest tightness with Dr. King and her primary care office at next follow-up or sooner if symptoms recur, immediate evaluation if symptoms do recur.  2. "[employer]s club for social support, wellness offerings.  3. Continue physical activity as above  4. DEXA scheduled 5/5/22  5. Follow Up Dr King 5/27/22  6. Primary Care 6/27/22  7. Mammo 10/14/22, review with Dr Delong 10/20/22  8. Lymphedema Clinic follow up 11/4/22  9. Call my office as needed at [phone] for additional information, resources or support.        ICD-10-CM ICD-9-CM   1. Malignant neoplasm of upper-inner quadrant of left breast in female, estrogen receptor positive (HCC)  C50.212 174.2    Z17.0 V86.0       No orders of the defined types were placed in this encounter.      I spent 40 minutes caring for this patient on this date of service by face-to-face counseling. This time includes time spent by me in the following activities: preparing for the visit, reviewing tests, obtaining and/or reviewing a separately obtained history, performing a medically appropriate examination and/or evaluation, counseling and educating the patient/family/caregiver and documenting information in the medical record            Ambulatory

## 2022-05-05 ENCOUNTER — HOSPITAL ENCOUNTER (OUTPATIENT)
Dept: BONE DENSITY | Facility: HOSPITAL | Age: 62
Discharge: HOME | End: 2022-05-05
Admitting: INTERNAL MEDICINE

## 2022-05-05 DIAGNOSIS — C50.212 MALIGNANT NEOPLASM OF UPPER-INNER QUADRANT OF LEFT BREAST IN FEMALE, ESTROGEN RECEPTOR POSITIVE: ICD-10-CM

## 2022-05-05 DIAGNOSIS — Z17.0 MALIGNANT NEOPLASM OF UPPER-INNER QUADRANT OF LEFT BREAST IN FEMALE, ESTROGEN RECEPTOR POSITIVE: ICD-10-CM

## 2022-05-05 PROCEDURE — 77080 DXA BONE DENSITY AXIAL: CPT

## 2022-05-27 ENCOUNTER — LAB (OUTPATIENT)
Dept: LAB | Facility: HOSPITAL | Age: 62
End: 2022-05-27

## 2022-05-27 ENCOUNTER — OFFICE VISIT (OUTPATIENT)
Dept: ONCOLOGY | Facility: CLINIC | Age: 62
End: 2022-05-27

## 2022-05-27 VITALS
OXYGEN SATURATION: 96 % | TEMPERATURE: 97.3 F | SYSTOLIC BLOOD PRESSURE: 175 MMHG | HEART RATE: 68 BPM | BODY MASS INDEX: 31.42 KG/M2 | HEIGHT: 67 IN | RESPIRATION RATE: 16 BRPM | WEIGHT: 200.2 LBS | DIASTOLIC BLOOD PRESSURE: 98 MMHG

## 2022-05-27 DIAGNOSIS — Z17.0 MALIGNANT NEOPLASM OF UPPER-INNER QUADRANT OF LEFT BREAST IN FEMALE, ESTROGEN RECEPTOR POSITIVE: ICD-10-CM

## 2022-05-27 DIAGNOSIS — F32.A DEPRESSION, UNSPECIFIED DEPRESSION TYPE: ICD-10-CM

## 2022-05-27 DIAGNOSIS — C50.212 MALIGNANT NEOPLASM OF UPPER-INNER QUADRANT OF LEFT BREAST IN FEMALE, ESTROGEN RECEPTOR POSITIVE: ICD-10-CM

## 2022-05-27 DIAGNOSIS — C50.212 MALIGNANT NEOPLASM OF UPPER-INNER QUADRANT OF LEFT BREAST IN FEMALE, ESTROGEN RECEPTOR POSITIVE: Primary | ICD-10-CM

## 2022-05-27 DIAGNOSIS — Z17.0 MALIGNANT NEOPLASM OF UPPER-INNER QUADRANT OF LEFT BREAST IN FEMALE, ESTROGEN RECEPTOR POSITIVE: Primary | ICD-10-CM

## 2022-05-27 LAB
ALBUMIN SERPL-MCNC: 4.4 G/DL (ref 3.5–5.2)
ALBUMIN/GLOB SERPL: 1.9 G/DL (ref 1.1–2.4)
ALP SERPL-CCNC: 123 U/L (ref 38–116)
ALT SERPL W P-5'-P-CCNC: 26 U/L (ref 0–33)
ANION GAP SERPL CALCULATED.3IONS-SCNC: 8 MMOL/L (ref 5–15)
AST SERPL-CCNC: 13 U/L (ref 0–32)
BASOPHILS # BLD AUTO: 0.03 10*3/MM3 (ref 0–0.2)
BASOPHILS NFR BLD AUTO: 0.4 % (ref 0–1.5)
BILIRUB SERPL-MCNC: 0.2 MG/DL (ref 0.2–1.2)
BUN SERPL-MCNC: 15 MG/DL (ref 6–20)
BUN/CREAT SERPL: 19.7 (ref 7.3–30)
CALCIUM SPEC-SCNC: 9.8 MG/DL (ref 8.5–10.2)
CHLORIDE SERPL-SCNC: 105 MMOL/L (ref 98–107)
CO2 SERPL-SCNC: 29 MMOL/L (ref 22–29)
CREAT SERPL-MCNC: 0.76 MG/DL (ref 0.6–1.1)
DEPRECATED RDW RBC AUTO: 47 FL (ref 37–54)
EGFRCR SERPLBLD CKD-EPI 2021: 89.8 ML/MIN/1.73
EOSINOPHIL # BLD AUTO: 1.02 10*3/MM3 (ref 0–0.4)
EOSINOPHIL NFR BLD AUTO: 12.3 % (ref 0.3–6.2)
ERYTHROCYTE [DISTWIDTH] IN BLOOD BY AUTOMATED COUNT: 13.9 % (ref 12.3–15.4)
GLOBULIN UR ELPH-MCNC: 2.3 GM/DL (ref 1.8–3.5)
GLUCOSE SERPL-MCNC: 219 MG/DL (ref 74–124)
HCT VFR BLD AUTO: 42.9 % (ref 34–46.6)
HGB BLD-MCNC: 14 G/DL (ref 12–15.9)
IMM GRANULOCYTES # BLD AUTO: 0.04 10*3/MM3 (ref 0–0.05)
IMM GRANULOCYTES NFR BLD AUTO: 0.5 % (ref 0–0.5)
LYMPHOCYTES # BLD AUTO: 0.92 10*3/MM3 (ref 0.7–3.1)
LYMPHOCYTES NFR BLD AUTO: 11.1 % (ref 19.6–45.3)
MCH RBC QN AUTO: 30 PG (ref 26.6–33)
MCHC RBC AUTO-ENTMCNC: 32.6 G/DL (ref 31.5–35.7)
MCV RBC AUTO: 92.1 FL (ref 79–97)
MONOCYTES # BLD AUTO: 0.51 10*3/MM3 (ref 0.1–0.9)
MONOCYTES NFR BLD AUTO: 6.1 % (ref 5–12)
NEUTROPHILS NFR BLD AUTO: 5.8 10*3/MM3 (ref 1.7–7)
NEUTROPHILS NFR BLD AUTO: 69.6 % (ref 42.7–76)
NRBC BLD AUTO-RTO: 0 /100 WBC (ref 0–0.2)
PLATELET # BLD AUTO: 198 10*3/MM3 (ref 140–450)
PMV BLD AUTO: 10.1 FL (ref 6–12)
POTASSIUM SERPL-SCNC: 4.6 MMOL/L (ref 3.5–4.7)
PROT SERPL-MCNC: 6.7 G/DL (ref 6.3–8)
RBC # BLD AUTO: 4.66 10*6/MM3 (ref 3.77–5.28)
SODIUM SERPL-SCNC: 142 MMOL/L (ref 134–145)
WBC NRBC COR # BLD: 8.32 10*3/MM3 (ref 3.4–10.8)

## 2022-05-27 PROCEDURE — 85025 COMPLETE CBC W/AUTO DIFF WBC: CPT

## 2022-05-27 PROCEDURE — 36415 COLL VENOUS BLD VENIPUNCTURE: CPT

## 2022-05-27 PROCEDURE — 80053 COMPREHEN METABOLIC PANEL: CPT

## 2022-05-27 PROCEDURE — 99214 OFFICE O/P EST MOD 30 MIN: CPT | Performed by: INTERNAL MEDICINE

## 2022-05-27 RX ORDER — LETROZOLE 2.5 MG/1
2.5 TABLET, FILM COATED ORAL DAILY
Qty: 90 TABLET | Refills: 2 | Status: SHIPPED | OUTPATIENT
Start: 2022-05-27 | End: 2023-03-16

## 2022-05-27 NOTE — PROGRESS NOTES
Subjective     REASON FOR follow-up:     1.  PT2N0, anatomic stage IIa, prognostic stage Ia invasive ductal carcinoma of the left breast,  intermediate grade ER positive, ND positive HER-2/juan negative s/p left partial mastectomy with sentinel lymph node biopsy with oncoplastic closure and right reduction for symmetry on 2022.    · Oncotype DX testin, no need for chemotherapy  · We will plan to start Femara 2022, will start towards the end of radiation    2.  S/p right nephrectomy for renal cell carcinoma at age 31        History of Present Illness   Patient is a 62-year-old female with recently diagnosed stage IIa left breast cancer ER/ND positive HER-2/juan negative.  She underwent surgery as of 2022.      Patient's Oncotype DX is back. It is 14. She does not require any chemotherapy. She will require to start on endocrine therapy and being postmenopausal will start her on aromatase inhibitor. She also will require a DEXA scan to evaluate for osteopenia. She is healed up from surgery. She is going to start radiation. Patient is going to receive a total dose of 3990 cGy in 15 fractions followed by boost to the tumor bed 1000 cGy in 5 fractions.    Interval history: She started Femara 2022 and is currently tolerating without any hot flashes or joint pains.        Oncologic history:  patient is a 61-year-old female who is a  for [employer]. She recently back in  noticed a mass in the left breast upper inner quadrant. She was playing with a cat and noticed some pain in the breast at which time she felt the breast and felt this mass. She has had history of nipple discharge on and off for years in the left breast but that has been very minimal. There is no bloody discharge. She has not had mammogram for years, at least 25 years according to her. So when she felt the mass she went to her primary care physician   Zarina Tineo who ordered a diagnostic mammogram and found in abnormality in the left breast at 10 o'clock position which was 2.6 x 2.5 x 2.3 cm. Subsequently she underwent a biopsy which was consistent with invasive ductal carcinoma ER/CT positive HER-2/juan negative.    She does have a family history of malignancy with her grandmother having: And breast cancer on her maternal side. Her maternal great-grandmother had breast cancer. Her mother had thyroid cancer. Patient has had renal cell cancer at age 31 and had to undergo left nephrectomy. She also had a thyroid nodule for which she was seen at [place] by physician. He had placed her on Synthroid and she had a very severe allergic reaction to Synthroid with swelling and he discontinued that. Patient was to be followed in a years time and has not gone.. But she has not had any issues with that    More recently she lost her  3 weeks ago as he was dealing with colon cancer. He was 70 years of age. He did not want any treatment. She had to take care of of him for the last 16 months and if no her symptoms. 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    Details are as follows    November 10, 2021: Bilateral diagnostic mammogram and ultrasound  FINDINGS: Bilateral digital CC and MLO mammographic and Tomosynthesis  images were obtained. No prior examination is available for comparison.  Scattered fibroglandular densities are seen throughout both breasts. In  the posterior one-third upper inner quadrant of the left breast at the  site of palpable concern corresponding to the overlying triangular skin  marker, there is an irregular mass that measures on the order of 2.6 x  2.5 x 2.3 cm. Internal coarse calcifications and areas of fat necrosis  are noted. I see no other dominant masses in either breast. No areas of  architectural distortion are appreciated. There is no evidence for skin  thickening,  nipple retraction or axillary adenopathy.     ULTRASOUND: Targeted sonographic evaluation of the left breast was  performed through the area of palpable concern which corresponds to the  10-o'clock position on the order of 5 cm from the nipple. At this  location, there is an irregular hypoechoic mass with internal  calcifications. The mass measures 2.4 x 1.9 x 1.4 cm. No detectable  internal vascularity is appreciated. Posterior acoustic shadowing is  noted.     IMPRESSION:  1. There is an irregular mass in the left breast at the 10-o'clock  position corresponding to the site of palpable concern. Sonographically,  the mass measures on the order of 2.4 cm in greatest dimension.  Correlation with an ultrasound-guided left breast biopsy is recommended.  2. There are no findings suspicious for malignancy in the right breast.     November 9, 2021: Ultrasound-guided left breast biopsy showed    Final Diagnosis   1. Left Breast, 10 O'clock, 5 cm from Nipple, Ultrasound-Guided Biopsies for a Mass: INVASIVE MAMMARY                CARCINOMA, NO SPECIAL TYPE (INVASIVE DUCTAL CARCINOMA).               A. Histologic grade: Eldon histologic score II (tubules = 3, nuclei = 2, mitosis = 1).               B. Largest contiguous invasive focus measures 10 mm in a core.               C. No definitive in situ component identified.                       D. No lymphovascular nor perineural invasion identified.     Estrogen receptor: %  Progesterone receptor: %  HER-2/juan: 1+, negative  Ki-67: 8%    December 28, 2021: MRI of the breast    FINDINGS:Scattered fibroglandular tissue is seen throughout both  breasts. Minimal background parenchymal enhancement of both breasts is  noted.     Within the posterior one-third upper inner quadrant of the left breast  centered at the 10 o'clock position on the order of 8 cm from nipple  there is an irregular enhancing mass that measures on the order of 2.5  cm in anterior to posterior  dimension, 2.8 cm in the mediolateral  dimension and 2.1 cm in the superior-inferior dimension. A signal void  is seen within the mass that corresponds to a bowtie-shaped metallic  clip. This represents the biopsy-proven site of malignancy.     No other areas of abnormal enhancement or morphology are seen within the  left breast. I see no evidence for abnormal left breast skin, nipple or  chest wall enhancement.  There is no evidence for left axillary or  internal mammary chain adenopathy.     There are no areas of abnormal enhancement or morphology in the right  breast. I see no evidence for abnormal right breast skin, nipple or  chest wall enhancement and there is no evidence for right axillary or  internal mammary chain adenopathy.     IMPRESSION:  1. Biopsy-proven malignancy in the left breast at the 10 o'clock  position in the posterior one-third that measures on the order of 2.8 cm  in greatest dimension with a centrally located bowtie shaped metallic  clip. No other suspicious findings are seen within the left breast and  there is no evidence for left axillary or internal mammary chain  adenopathy.  2. There are no findings suspicious for malignancy in the right breast.     BI-RADS category 6: Known malignancy.     This report was finalized on 12/28/2021 7:24 AM by Dr. Sixto Ramos M.D.     Patient has been scheduled for further recommendations. She has appointment with Dr. Beth Delong at 11 AM today.    January 17, 2022: Patient is s/p surgery left partial mastectomy with sentinel lymph node biopsy      Synoptic Checklist  INVASIVE CARCINOMA OF THE BREAST: Resection (INVASIVE CARCINOMA OF THE BREAST: COMPLETE EXCISION - All Specimens)  Specimen Laterality Left  .  TUMOR  Tumor Site Upper outer quadrant  Clock position  10 o'clock  Tumor Site Distance from nipple (Centimeters): 5 cm  Histologic Type Invasive carcinoma of no special type (ductal)  Histologic Grade ([place] Histologic  Score)  Glandular (Acinar) / Tubular Differentiation Score 3  Nuclear Pleomorphism Score 2  Mitotic Rate Score 1  Overall Grade Grade 2 (scores of 6 or 7)  Tumor Size Greatest dimension of largest invasive focus (Millimeters): 30 mm  Tumor Focality Single focus of invasive carcinoma  Ductal Carcinoma In Situ (DCIS) Not identified  Lobular Carcinoma In Situ (LCIS) Not identified  Tumor Extent  Lymphovascular Invasion Not identified  Dermal Lymphovascular Invasion Not identified  Microcalcifications Present in invasive carcinoma  Present in non-neoplastic tissue  Treatment Effect in the Breast No known presurgical therapy  .  MARGINS  Margin Status for Invasive Carcinoma All margins negative for invasive carcinoma  Distance from Invasive Carcinoma to Closest Margin 12.8 mm  Closest Margin(s) to Invasive Carcinoma Inferior  Distance from Invasive Carcinoma to Posterior Margin 23 mm  Distance from Invasive Carcinoma to Superior Margin 13 mm  Distance from Invasive Carcinoma to Medial Margin  Comment  The patient's corresponding left breast core biopsy material is pulled and reviewed for comparison ([id number]). The  tumors appear similar; no discordance is noted.  Selected slides from this case are shared in internal consultation with Bam Campbell and  who concur.  [facility]/kds  .  REGIONAL LYMPH NODES  Regional Lymph Node Status All regional lymph nodes negative for tumor  Total Number of Lymph Nodes Examined (sentinel and non-sentinel) 1  Number of [place] Nodes Examined 1  .  PATHOLOGIC STAGE CLASSIFICATION (pTNM, AJCC 8th Edition)    pT Category pT2  Regional Lymph Nodes Modifier (sn): [place] node(s) evaluated.  pN Category pN0    ER %  VA %  HER-2/juan 1±  Ki-67 8%    Oncotype DX 14. No plans for chemo    We will start Femara    Past Medical History:   Diagnosis Date   • Anesthesia complication     SLOW TO WAKE UP   • Arthritis    • Breast cancer (HCC) 2021    Invasive ductal carcinoma of left breast     • History of kidney cancer     S/P right nephrectomy in 1991   • Hypertension    • Tumor, thyroid    • Type 2 diabetes mellitus (HCC)         Past Surgical History:   Procedure Laterality Date   • BACK SURGERY  1990   • BREAST BIOPSY Right    • BREAST LUMPECTOMY WITH SENTINEL NODE BIOPSY Left 1/17/2022    Procedure: Left ultrasound-guided partial mastectomy and sentinel lymph node biopsy;  Surgeon: Beth Delong MD;  Location: [facility];  Service: General;  Laterality: Left;   • BREAST RECONSTRUCTION Bilateral 1/17/2022    Procedure: Right breast reduction/mastopexy, left breast oncoplastic closure, bilateral chest liposuction;  Surgeon: Simon Castro MD;  Location: [facility];  Service: Plastics;  Laterality: Bilateral;   • COLONOSCOPY     • HYSTERECTOMY  1992   • KNEE SURGERY Right 2001    RECONSTRUCTION   • KNEE SURGERY Left 2003    RECONSTRUCTION   • NEPHRECTOMY Right 1991   • REDUCTION MAMMAPLASTY  2008   • THYROID BIOPSY Bilateral 2010        Current Outpatient Medications on File Prior to Visit   Medication Sig Dispense Refill   • amLODIPine (NORVASC) 5 MG tablet Take 1 tablet by mouth Daily. 90 tablet 1   • Blood Glucose Monitoring Suppl (OneTouch Verio Reflect) w/Device kit See Admin Instructions.     • glucose blood test strip 1 each by Other route Daily. 25 each 2   • Lancets (freestyle) lancets 1 each by Other route Daily. 100 each 4   • lisinopril (PRINIVIL,ZESTRIL) 10 MG tablet Take 1 tablet by mouth Daily. 90 tablet 0   • metFORMIN ER (GLUCOPHAGE-XR) 500 MG 24 hr tablet Take 2 tablets by mouth 2 (Two) Times a Day With Meals. 360 tablet 1     No current facility-administered medications on file prior to visit.        ALLERGIES:    Allergies   Allergen Reactions   • Hydrocodone Anaphylaxis     PER PATIENT   • Penicillins Itching        Social History     Socioeconomic History   • Marital status:      Spouse name: Lucas   • Number of children: 0   • Highest education  level: Bachelor's degree (e.g., BA, AB, BS)   Tobacco Use   • Smoking status: Former Smoker     Packs/day: 0.00     Years: 0.00     Pack years: 0.00     Types: Cigarettes     Quit date: 1991     Years since quittin.4   • Smokeless tobacco: Never Used   Vaping Use   • Vaping Use: Never used   Substance and Sexual Activity   • Alcohol use: Not Currently     Comment: rarely   • Drug use: Never   • Sexual activity: Not Currently     Partners: Male     Birth control/protection: Post-menopausal, None        Family History   Problem Relation Age of Onset   • Diabetes type II Mother    • Hypertension Mother    • Thyroid cancer Mother    • Breast cancer Maternal Grandmother    • Colon cancer Maternal Grandmother    • Diabetes Maternal Grandmother    • Stroke Maternal Grandmother    • Alzheimer's disease Maternal Grandfather    • Malig Hyperthermia Neg Hx         Review of Systems   Constitutional: Positive for fatigue. Negative for appetite change, chills, diaphoresis, fever and unexpected weight change.   HENT: Negative for hearing loss, sore throat and trouble swallowing.    Respiratory: Negative for cough, chest tightness, shortness of breath and wheezing.    Cardiovascular: Negative for chest pain, palpitations and leg swelling.   Gastrointestinal: Positive for nausea. Negative for abdominal distention, abdominal pain, constipation, diarrhea and vomiting.   Genitourinary: Negative for dysuria, frequency, hematuria and urgency.   Musculoskeletal: Negative for joint swelling.        No muscle weakness.   Skin: Negative for rash and wound.   Neurological: Positive for headaches. Negative for seizures, syncope, speech difficulty, weakness and numbness.   Hematological: Negative for adenopathy. Does not bruise/bleed easily.   Psychiatric/Behavioral: Positive for sleep disturbance. Negative for behavioral problems, confusion and suicidal ideas.   All other systems reviewed and are negative.  Patient has pelvic  "tenderness in the left upper inner quadrant of the breast and intermittent left nipple discharge which is nonbloody and very minimal    Family history:  Mother had thyroid cancer. Grandmother had colon cancer and breast cancer on the maternal side and great grandmother had breast cancer on the maternal side. Patient has had thyroid nodule which was biopsied 10 years ago and benign. She had renal cell cancer s/p left nephrectomy.    OB/GYN history  Age of menstruation: 13 years  Age of menopause: 50+ years   0 para 0, no miscarriages  Patient was exposed to birth control pills only the posterior of her marriage and took it just for 1 year  Patient did not take any postmenopausal hormone replacement therapy  Patient had hysterectomy 30 years ago.    Social history: She lost her  just recently and is willing upset about that. She works as a  in [employer]. She smoked just a few cigarettes for short very short time when she was in college. She does not drink alcohol. She has no kids.    Past medical history:  Hypertension, followed by her primary care  Diabetes mellitus for which she is on Metformin  Thyroid nodule which on biopsy was benign in the past    Objective     Vitals:    22 0823   BP: 175/98   Pulse: 68   Resp: 16   Temp: 97.3 °F (36.3 °C)   TempSrc: Temporal   SpO2: 96%   Weight: 90.8 kg (200 lb 3.2 oz)   Height: 170.2 cm (67.01\")   PainSc: 0-No pain     Current Status 2022   ECOG score 0       Physical Exam      Patient screened positive for depression based on a PHQ-9 score of 4 on 2022. Follow-up recommendations include: PCP managing depression.      CONSTITUTIONAL:  Vital signs reviewed.  No distress, looks comfortable.  EYES:  Conjunctivae and lids unremarkable.  PERRLA  EARS,NOSE,MOUTH,THROAT:  Ears and nose appear unremarkable.  Lips, teeth, gums appear unremarkable.  RESPIRATORY:  Normal respiratory effort.  Lungs clear to auscultation " bilaterally.  CARDIOVASCULAR:  Normal S1, S2.  No murmurs rubs or gallops.  No significant lower extremity edema.  GASTROINTESTINAL: Abdomen appears unremarkable.  Nontender.  No hepatomegaly.  No splenomegaly.  LYMPHATIC:  No cervical, supraclavicular, axillary lymphadenopathy.  SKIN:  Warm.  No rashes.  PSYCHIATRIC:  Normal judgment and insight.  Normal mood and affect.    RECENT LABS:  Hematology WBC   Date Value Ref Range Status   05/27/2022 8.32 3.40 - 10.80 10*3/mm3 Final   03/22/2022 6.6 3.4 - 10.8 x10E3/uL Final     RBC   Date Value Ref Range Status   05/27/2022 4.66 3.77 - 5.28 10*6/mm3 Final   03/22/2022 4.99 3.77 - 5.28 x10E6/uL Final     Hemoglobin   Date Value Ref Range Status   05/27/2022 14.0 12.0 - 15.9 g/dL Final     Hematocrit   Date Value Ref Range Status   05/27/2022 42.9 34.0 - 46.6 % Final     Platelets   Date Value Ref Range Status   05/27/2022 198 140 - 450 10*3/mm3 Final          Assessment & Plan     1.  T2 N0 M0, anatomic stage IIa , prognostic stage Ib invasive ductal carcinoma of the left breast at 10 o'clock position, grade 2, ER 91 x 100%, MO 91/100%, HER-2/juan 1+, Ki-67 8%.  No evidence of lymphovascular space invasion or perineural invasion  · Patient has not had screening mammogram for 25 years  · She felt a mass in September 2021 when she was holding the cat and noticed pain in the left breast upper inner quadrant and then she felt the mass, she was seen by her primary care physician  · Diagnostic mammogram, November 10, 2021: Bilateral showed a 2.5 x 2.6 x 2.3 cm mass in the posterior one third upper inner quadrant of the left breast.  Internal coarse calcification and areas of fat necrosis were noted.  No other dominant no masses were seen.  There was no skin thickening, nipple retraction or axillary adenopathy  · Ultrasound showed a 2.4 x 1.9 x 1.4 cm mass in the 10 o'clock position 5 cm from the nipple  · December 2, 2021 ultrasound-guided left breast biopsy showed invasive  ductal carcinoma, grade 2 with a Eldon score of 6, %, %, HER-2/juan 1+, Ki-67 8%  · December 23, 2021: Patient underwent MRI of the breast which showed a 2.5 x 2.8 x 2.1 cm irregular enhancing mass at 10 o'clock position, 8 cm from the nipple.  There was no evidence of any left axillary or internal mammary chain lymphadenopathy.  Right breast was negative  · I had a lengthy discussion with the patient about the nature of the breast cancer that was present.  We told her that was the most common type of breast cancer and given that it was ER/PA positive HER-2/juan negative without any lymphovascular or perineural invasion and Ki-67 of 8% it does not appear to be a highly aggressive disease.  He states that the lump did not increase in size since she first noticed it.  · We then discussed about options of therapy of surgery upfront, followed by evaluation for Oncotype DX testing to see if she will be eligible for any chemotherapy and also will be eligible for endocrine therapy.  · Patient is to see Dr. Beth Delong  today and we will await her input, I did discuss that likely the best option is for consideration of surgery upfront and will await to hear from Dr. Delong  · However patient has family history of breast cancer in her maternal grandmother and great-grandmother and colon cancer in her maternal grandmother, thyroid cancer in her mother and personal history of renal cell cancer.  · We then discussed about obtaining INVITAE genetic test today and has been ordered in our office for the 9 breast cancer gene with reflux to be 47 gene panel.  · I told her surgical options will be discussed by Dr. Delong.  · January 17, 2022 Left breast lumpectomy, grade 2 invasive ductal carcinoma measuring 30 mm with perineural invasion and microcalcifications identified adjacent to the clip and biopsy site change.  Focal minimal associated atypical ductal hyperplasia but no definite in situ carcinoma is  identified all margins are free of tumor closest margin being 2.8 mm.  Incidental fibroadenoma 2.6 mm maximally with focal fat necrosis.  0 of 1 sentinel lymph node negative.  Right breast mammoplasty was benign  · February 25, 2022: Patient already seen by radiation oncology with plans to start radiation.  · Oncotype DX testing shows score of 14, her benefit from chemo is less than 1%  · February 2022: Started Femara and tolerating well      2.  Family history of malignancy:  · However patient has family history of breast cancer in her maternal grandmother and great-grandmother and colon cancer in her maternal grandmother, thyroid cancer in her mother and personal history of renal cell cancer.  · We then discussed about obtaining INVITAE genetic test today and has been ordered in our office for the 9 breast cancer gene with reflux to be 47 gene panel.  · INVITAE genetic test negative for 47 genes    3.  Hypertension currently on lisinopril followed by primary care physician.  Today mildly elevated blood pressure  · Stable    4.  History of diabetes mellitus currently on Metformin    5.  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  History of renal cell cancer s/p left nephrectomy without evidence of recurrence and at age 31    7.  Osteopenia: Reviewed the bone density which is within normal limits    Plan  · Continue Femara  · Continue calcium and vitamin D supplements  · Reviewed bone density  · Follow-up in 4 months with labs  Monitor high risk medication    I spent 30 total minutes, face-to-face, caring for Daysi today.  Greater than 50% of this time involved counseling and/or coordination of care as documented within this note.      MD Dr. Zarina Hernandez Dr., Dr.

## 2022-06-27 ENCOUNTER — OFFICE VISIT (OUTPATIENT)
Dept: FAMILY MEDICINE CLINIC | Facility: CLINIC | Age: 62
End: 2022-06-27

## 2022-06-27 VITALS
OXYGEN SATURATION: 99 % | TEMPERATURE: 97 F | BODY MASS INDEX: 31.45 KG/M2 | SYSTOLIC BLOOD PRESSURE: 146 MMHG | HEART RATE: 74 BPM | WEIGHT: 200.4 LBS | HEIGHT: 67 IN | DIASTOLIC BLOOD PRESSURE: 84 MMHG

## 2022-06-27 DIAGNOSIS — E11.9 TYPE 2 DIABETES MELLITUS WITHOUT COMPLICATION, WITHOUT LONG-TERM CURRENT USE OF INSULIN: Primary | ICD-10-CM

## 2022-06-27 DIAGNOSIS — I10 PRIMARY HYPERTENSION: ICD-10-CM

## 2022-06-27 DIAGNOSIS — R05.9 COUGH: ICD-10-CM

## 2022-06-27 LAB
EXPIRATION DATE: ABNORMAL
HBA1C MFR BLD: 7.1 %
Lab: ABNORMAL

## 2022-06-27 PROCEDURE — 99214 OFFICE O/P EST MOD 30 MIN: CPT | Performed by: FAMILY MEDICINE

## 2022-06-27 PROCEDURE — 83036 HEMOGLOBIN GLYCOSYLATED A1C: CPT | Performed by: FAMILY MEDICINE

## 2022-06-27 RX ORDER — LISINOPRIL 10 MG/1
10 TABLET ORAL DAILY
Qty: 90 TABLET | Refills: 0 | Status: SHIPPED | OUTPATIENT
Start: 2022-06-27 | End: 2022-10-28

## 2022-06-27 RX ORDER — METFORMIN HYDROCHLORIDE 500 MG/1
1000 TABLET, EXTENDED RELEASE ORAL 2 TIMES DAILY WITH MEALS
Qty: 360 TABLET | Refills: 0 | Status: SHIPPED | OUTPATIENT
Start: 2022-06-27 | End: 2022-11-28

## 2022-06-27 RX ORDER — AMLODIPINE BESYLATE 10 MG/1
10 TABLET ORAL DAILY
Qty: 90 TABLET | Refills: 0 | Status: SHIPPED | OUTPATIENT
Start: 2022-06-27 | End: 2022-09-29

## 2022-06-27 NOTE — PROGRESS NOTES
"Chief Complaint  Hypertension and Diabetes    Subjective        Daysi Lugo presents to [facility]  Patient is here today to follow-up on her chronic health conditions:    Hypertension.  She is currently taking lisinopril to 10 mg daily and amlodipine 5 mg daily.  Patient states that she has no side effects but feels that her blood pressures have been running higher at home.     type 2 diabetes.  She was diagnosed by a A1c of 8.0 on September 23, 2021.  She is taking metformin 1000mg BID.  She is tolerating the medication well and denies any side effects.  Patient has noticed higher blood sugar readings lately.  She states she has not been as good with her diet lately    Patient is also here for new problem of a cough.  The patient states that she developed a cough about 4 to 5 months ago.  It has been persistent and daily.  It is nonproductive.  She denies any shortness of breath or chest pain.  However the cough does keep her up at night.  The patient denies any fever, chills, or upper respiratory symptoms otherwise.  Patient has had 3 doses of the COVID-vaccine and the last dose was 3 months ago.    Diabetes  She has type 2 diabetes mellitus. No MedicAlert identification noted.       Objective   Vital Signs:  /84   Pulse 74   Temp 97 °F (36.1 °C)   Ht 170.2 cm (67\")   Wt 90.9 kg (200 lb 6.4 oz)   SpO2 99%   BMI 31.39 kg/m²   Estimated body mass index is 31.39 kg/m² as calculated from the following:    Height as of this encounter: 170.2 cm (67\").    Weight as of this encounter: 90.9 kg (200 lb 6.4 oz).          Physical Exam  Vitals and nursing note reviewed.   Constitutional:       Appearance: Normal appearance. She is well-developed. She is obese.   HENT:      Head: Normocephalic and atraumatic.   Eyes:      General: No scleral icterus.     Conjunctiva/sclera: Conjunctivae normal.   Cardiovascular:      Rate and Rhythm: Normal rate and regular rhythm.      Heart " sounds: Normal heart sounds.   Pulmonary:      Effort: Pulmonary effort is normal.      Breath sounds: Normal breath sounds.   Musculoskeletal:         General: Normal range of motion.      Cervical back: Normal range of motion and neck supple.      Right lower leg: No edema.      Left lower leg: No edema.   Skin:     General: Skin is warm and dry.      Capillary Refill: Capillary refill takes less than 2 seconds.      Findings: No rash.   Neurological:      Mental Status: She is alert and oriented to person, place, and time.   Psychiatric:         Mood and Affect: Mood normal.         Behavior: Behavior normal.         Thought Content: Thought content normal.         Judgment: Judgment normal.        Result Review :  The following data was reviewed by: Zarina Tineo DO on 06/27/2022:  Common labs    Common Labsle 3/22/22 3/22/22 3/22/22 5/27/22 5/27/22 6/27/22    1045 1045 1045 0809 0809    Glucose   116 (A)  219 (A)    BUN   19  15    Creatinine   0.82  0.76    Sodium   140  142    Potassium   5.0  4.6    Chloride   100  105    Calcium   10.5 (A)  9.8    Total Protein   7.0      Albumin   4.8  4.40    Total Bilirubin   0.5  0.2    Alkaline Phosphatase   104  123 (A)    AST (SGOT)   15  13    ALT (SGPT)   20  26    WBC  6.6  8.32     Hemoglobin  14.8  14.0     Hematocrit  44.6  42.9     Platelets  246  198     Hemoglobin A1C 6.8 (A)     7.1   (A) Abnormal value       Comments are available for some flowsheets but are not being displayed.           TSH    TSH 9/23/21   TSH 2.260                     Assessment and Plan   Diagnoses and all orders for this visit:    1. Type 2 diabetes mellitus without complication, without long-term current use of insulin (HCC) (Primary)  -     POC Glycosylated Hemoglobin (Hb A1C)  -     metFORMIN ER (GLUCOPHAGE-XR) 500 MG 24 hr tablet; Take 2 tablets by mouth 2 (Two) Times a Day With Meals.  Dispense: 360 tablet; Refill: 0    2. Primary hypertension  -     amLODIPine (NORVASC) 10  MG tablet; Take 1 tablet by mouth Daily.  Dispense: 90 tablet; Refill: 0  -     lisinopril (PRINIVIL,ZESTRIL) 10 MG tablet; Take 1 tablet by mouth Daily.  Dispense: 90 tablet; Refill: 0    3. Cough  -     XR Chest 2 View; Future      Patient is here today to follow-up on uncontrolled diabetes and uncontrolled hypertension.  Reviewed labs drawn on May 27, 2022 and other than elevated glucose all labs are stable.  I have increased her amlodipine to 10 mg daily.  She will continue taking lisinopril 10 mg daily.  She will work harder on eating a more diabetic diet.       For the new problem of cough.  I will get a chest x-ray and patient will follow-up if symptoms are worsening.       Follow Up   Return in about 3 months (around 9/27/2022) for HTN, Diabetes.  Patient was given instructions and counseling regarding her condition or for health maintenance advice. Please see specific information pulled into the AVS if appropriate.       Answers for HPI/ROS submitted by the patient on 6/21/2022  What is the primary reason for your visit?: Diabetes

## 2022-06-30 ENCOUNTER — HOSPITAL ENCOUNTER (OUTPATIENT)
Dept: GENERAL RADIOLOGY | Facility: HOSPITAL | Age: 62
Discharge: HOME | End: 2022-06-30
Admitting: FAMILY MEDICINE

## 2022-06-30 DIAGNOSIS — R05.9 COUGH: ICD-10-CM

## 2022-06-30 PROCEDURE — 71046 X-RAY EXAM CHEST 2 VIEWS: CPT

## 2022-07-01 DIAGNOSIS — R93.89 NODULAR RADIOLOGIC DENSITY: Primary | ICD-10-CM

## 2022-07-01 DIAGNOSIS — C50.912 INFILTRATING DUCTAL CARCINOMA OF LEFT BREAST: ICD-10-CM

## 2022-07-01 DIAGNOSIS — R05.9 COUGH: ICD-10-CM

## 2022-07-15 ENCOUNTER — TELEPHONE (OUTPATIENT)
Dept: FAMILY MEDICINE CLINIC | Facility: CLINIC | Age: 62
End: 2022-07-15

## 2022-07-18 ENCOUNTER — HOSPITAL ENCOUNTER (OUTPATIENT)
Dept: CT IMAGING | Facility: HOSPITAL | Age: 62
End: 2022-07-18

## 2022-07-22 ENCOUNTER — HOSPITAL ENCOUNTER (OUTPATIENT)
Dept: CT IMAGING | Facility: HOSPITAL | Age: 62
Discharge: HOME | End: 2022-07-22
Admitting: FAMILY MEDICINE

## 2022-07-22 DIAGNOSIS — R05.9 COUGH: ICD-10-CM

## 2022-07-22 DIAGNOSIS — C50.912 INFILTRATING DUCTAL CARCINOMA OF LEFT BREAST: ICD-10-CM

## 2022-07-22 DIAGNOSIS — R93.89 NODULAR RADIOLOGIC DENSITY: ICD-10-CM

## 2022-07-22 PROCEDURE — 0 IOPAMIDOL PER 1 ML: Performed by: FAMILY MEDICINE

## 2022-07-22 PROCEDURE — 71260 CT THORAX DX C+: CPT

## 2022-07-22 RX ADMIN — IOPAMIDOL 100 ML: 755 INJECTION, SOLUTION INTRAVENOUS at 15:07

## 2022-08-31 ENCOUNTER — TELEPHONE (OUTPATIENT)
Dept: ONCOLOGY | Facility: CLINIC | Age: 62
End: 2022-08-31

## 2022-09-15 ENCOUNTER — TELEPHONE (OUTPATIENT)
Dept: ONCOLOGY | Facility: CLINIC | Age: 62
End: 2022-09-15

## 2022-09-15 ENCOUNTER — LAB (OUTPATIENT)
Dept: LAB | Facility: HOSPITAL | Age: 62
End: 2022-09-15

## 2022-09-15 ENCOUNTER — OFFICE VISIT (OUTPATIENT)
Dept: ONCOLOGY | Facility: CLINIC | Age: 62
End: 2022-09-15

## 2022-09-15 VITALS
TEMPERATURE: 96.9 F | BODY MASS INDEX: 32.13 KG/M2 | HEIGHT: 67 IN | RESPIRATION RATE: 16 BRPM | WEIGHT: 204.7 LBS | DIASTOLIC BLOOD PRESSURE: 87 MMHG | HEART RATE: 75 BPM | SYSTOLIC BLOOD PRESSURE: 139 MMHG | OXYGEN SATURATION: 96 %

## 2022-09-15 DIAGNOSIS — C50.212 MALIGNANT NEOPLASM OF UPPER-INNER QUADRANT OF LEFT BREAST IN FEMALE, ESTROGEN RECEPTOR POSITIVE: Primary | ICD-10-CM

## 2022-09-15 DIAGNOSIS — Z17.0 MALIGNANT NEOPLASM OF UPPER-INNER QUADRANT OF LEFT BREAST IN FEMALE, ESTROGEN RECEPTOR POSITIVE: Primary | ICD-10-CM

## 2022-09-15 DIAGNOSIS — C50.212 MALIGNANT NEOPLASM OF UPPER-INNER QUADRANT OF LEFT BREAST IN FEMALE, ESTROGEN RECEPTOR POSITIVE: ICD-10-CM

## 2022-09-15 DIAGNOSIS — Z17.0 MALIGNANT NEOPLASM OF UPPER-INNER QUADRANT OF LEFT BREAST IN FEMALE, ESTROGEN RECEPTOR POSITIVE: ICD-10-CM

## 2022-09-15 DIAGNOSIS — E83.52 HYPERCALCEMIA: ICD-10-CM

## 2022-09-15 LAB
ALBUMIN SERPL-MCNC: 4.5 G/DL (ref 3.5–5.2)
ALBUMIN/GLOB SERPL: 1.7 G/DL (ref 1.1–2.4)
ALP SERPL-CCNC: 110 U/L (ref 38–116)
ALT SERPL W P-5'-P-CCNC: 62 U/L (ref 0–33)
ANION GAP SERPL CALCULATED.3IONS-SCNC: 15.1 MMOL/L (ref 5–15)
AST SERPL-CCNC: 46 U/L (ref 0–32)
BASOPHILS # BLD AUTO: 0.02 10*3/MM3 (ref 0–0.2)
BASOPHILS NFR BLD AUTO: 0.3 % (ref 0–1.5)
BILIRUB SERPL-MCNC: 0.4 MG/DL (ref 0.2–1.2)
BUN SERPL-MCNC: 14 MG/DL (ref 6–20)
BUN/CREAT SERPL: 16.9 (ref 7.3–30)
CALCIUM SPEC-SCNC: 10.8 MG/DL (ref 8.5–10.2)
CHLORIDE SERPL-SCNC: 100 MMOL/L (ref 98–107)
CO2 SERPL-SCNC: 23.9 MMOL/L (ref 22–29)
CREAT SERPL-MCNC: 0.83 MG/DL (ref 0.6–1.1)
DEPRECATED RDW RBC AUTO: 42.2 FL (ref 37–54)
EGFRCR SERPLBLD CKD-EPI 2021: 80.8 ML/MIN/1.73
EOSINOPHIL # BLD AUTO: 0.86 10*3/MM3 (ref 0–0.4)
EOSINOPHIL NFR BLD AUTO: 11.6 % (ref 0.3–6.2)
ERYTHROCYTE [DISTWIDTH] IN BLOOD BY AUTOMATED COUNT: 12.4 % (ref 12.3–15.4)
GLOBULIN UR ELPH-MCNC: 2.7 GM/DL (ref 1.8–3.5)
GLUCOSE SERPL-MCNC: 139 MG/DL (ref 74–124)
HCT VFR BLD AUTO: 44.9 % (ref 34–46.6)
HGB BLD-MCNC: 14.9 G/DL (ref 12–15.9)
IMM GRANULOCYTES # BLD AUTO: 0.02 10*3/MM3 (ref 0–0.05)
IMM GRANULOCYTES NFR BLD AUTO: 0.3 % (ref 0–0.5)
LYMPHOCYTES # BLD AUTO: 1.3 10*3/MM3 (ref 0.7–3.1)
LYMPHOCYTES NFR BLD AUTO: 17.5 % (ref 19.6–45.3)
MCH RBC QN AUTO: 30.8 PG (ref 26.6–33)
MCHC RBC AUTO-ENTMCNC: 33.2 G/DL (ref 31.5–35.7)
MCV RBC AUTO: 93 FL (ref 79–97)
MONOCYTES # BLD AUTO: 0.4 10*3/MM3 (ref 0.1–0.9)
MONOCYTES NFR BLD AUTO: 5.4 % (ref 5–12)
NEUTROPHILS NFR BLD AUTO: 4.81 10*3/MM3 (ref 1.7–7)
NEUTROPHILS NFR BLD AUTO: 64.9 % (ref 42.7–76)
NRBC BLD AUTO-RTO: 0 /100 WBC (ref 0–0.2)
PLATELET # BLD AUTO: 156 10*3/MM3 (ref 140–450)
PMV BLD AUTO: 11.1 FL (ref 6–12)
POTASSIUM SERPL-SCNC: 5.3 MMOL/L (ref 3.5–4.7)
PROT SERPL-MCNC: 7.2 G/DL (ref 6.3–8)
RBC # BLD AUTO: 4.83 10*6/MM3 (ref 3.77–5.28)
SODIUM SERPL-SCNC: 139 MMOL/L (ref 134–145)
WBC NRBC COR # BLD: 7.41 10*3/MM3 (ref 3.4–10.8)

## 2022-09-15 PROCEDURE — 99214 OFFICE O/P EST MOD 30 MIN: CPT | Performed by: INTERNAL MEDICINE

## 2022-09-15 PROCEDURE — 36415 COLL VENOUS BLD VENIPUNCTURE: CPT

## 2022-09-15 PROCEDURE — 85025 COMPLETE CBC W/AUTO DIFF WBC: CPT

## 2022-09-15 PROCEDURE — 80053 COMPREHEN METABOLIC PANEL: CPT

## 2022-09-15 NOTE — TELEPHONE ENCOUNTER
Call to MsShen Parish to let her know her calcium level is elevated on lab results today.  Let her know that Dr. King wants her to come back in the office in a month for some extra labs and to have calcium rechecked with a nurse review.  Patient verbalized understanding.

## 2022-09-15 NOTE — PROGRESS NOTES
Subjective     REASON FOR follow-up:     1.  PT2N0, anatomic stage IIa, prognostic stage Ia invasive ductal carcinoma of the left breast,  intermediate grade ER positive, ID positive HER-2/juan negative s/p left partial mastectomy with sentinel lymph node biopsy with oncoplastic closure and right reduction for symmetry on 2022.    · Oncotype DX testin, no need for chemotherapy  · We will plan to start Femara 2022, will start towards the end of radiation    2.  S/p right nephrectomy for renal cell carcinoma at age 31        History of Present Illness   Patient is a 62-year-old female with recently diagnosed stage IIa left breast cancer ER/ID positive HER-2/juan negative.  She underwent surgery as of 2022.      Patient's Oncotype DX is back. It is 14. She does not require any chemotherapy. She will require to start on endocrine therapy and being postmenopausal will start her on aromatase inhibitor. She also will require a DEXA scan to evaluate for osteopenia. She is healed up from surgery. She is going to start radiation. Patient is going to receive a total dose of 3990 cGy in 15 fractions followed by boost to the tumor bed 1000 cGy in 5 fractions.    Interval history: She started Femara 2022 and is currently tolerating without any hot flashes or joint pains.    Patient has not not lost weight and she is tolerating Femara well without arthralgias.  Patient saw Maru Dey May 4, 2022.  She schedule screening mammogram with tomosynthesis in 6 months        Oncologic history:  patient is a 61-year-old female who is a  for [employer]. She recently back in  noticed a mass in the left breast upper inner quadrant. She was playing with a cat and noticed some pain in the breast at which time she felt the breast and felt this mass. She has had history of nipple discharge on and off for years in the left breast but that has  been very minimal. There is no bloody discharge. She has not had mammogram for years, at least 25 years according to her. So when she felt the mass she went to her primary care physician Dr. Zarina Tineo who ordered a diagnostic mammogram and found in abnormality in the left breast at 10 o'clock position which was 2.6 x 2.5 x 2.3 cm. Subsequently she underwent a biopsy which was consistent with invasive ductal carcinoma ER/CO positive HER-2/juan negative.    She does have a family history of malignancy with her grandmother having: And breast cancer on her maternal side. Her maternal great-grandmother had breast cancer. Her mother had thyroid cancer. Patient has had renal cell cancer at age 31 and had to undergo left nephrectomy. She also had a thyroid nodule for which she was seen at [place] by physician. He had placed her on Synthroid and she had a very severe allergic reaction to Synthroid with swelling and he discontinued that. Patient was to be followed in a years time and has not gone.. But she has not had any issues with that    More recently she lost her  3 weeks ago as he was dealing with colon cancer. He was 70 years of age. He did not want any treatment. She had to take care of of him for the last 16 months and if no her symptoms. 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    Details are as follows    November 10, 2021: Bilateral diagnostic mammogram and ultrasound  FINDINGS: Bilateral digital CC and MLO mammographic and Tomosynthesis  images were obtained. No prior examination is available for comparison.  Scattered fibroglandular densities are seen throughout both breasts. In  the posterior one-third upper inner quadrant of the left breast at the  site of palpable concern corresponding to the overlying triangular skin  marker, there is an irregular mass that measures on the order of 2.6 x  2.5 x 2.3 cm. Internal coarse  calcifications and areas of fat necrosis  are noted. I see no other dominant masses in either breast. No areas of  architectural distortion are appreciated. There is no evidence for skin  thickening, nipple retraction or axillary adenopathy.     ULTRASOUND: Targeted sonographic evaluation of the left breast was  performed through the area of palpable concern which corresponds to the  10-o'clock position on the order of 5 cm from the nipple. At this  location, there is an irregular hypoechoic mass with internal  calcifications. The mass measures 2.4 x 1.9 x 1.4 cm. No detectable  internal vascularity is appreciated. Posterior acoustic shadowing is  noted.     IMPRESSION:  1. There is an irregular mass in the left breast at the 10-o'clock  position corresponding to the site of palpable concern. Sonographically,  the mass measures on the order of 2.4 cm in greatest dimension.  Correlation with an ultrasound-guided left breast biopsy is recommended.  2. There are no findings suspicious for malignancy in the right breast.     November 9, 2021: Ultrasound-guided left breast biopsy showed    Final Diagnosis   1. Left Breast, 10 O'clock, 5 cm from Nipple, Ultrasound-Guided Biopsies for a Mass: INVASIVE MAMMARY                CARCINOMA, NO SPECIAL TYPE (INVASIVE DUCTAL CARCINOMA).               A. Histologic grade: [place] histologic score II (tubules = 3, nuclei = 2, mitosis = 1).               B. Largest contiguous invasive focus measures 10 mm in a core.               C. No definitive in situ component identified.                       D. No lymphovascular nor perineural invasion identified.     Estrogen receptor: %  Progesterone receptor: %  HER-2/juan: 1+, negative  Ki-67: 8%    December 28, 2021: MRI of the breast    FINDINGS:Scattered fibroglandular tissue is seen throughout both  breasts. Minimal background parenchymal enhancement of both breasts is  noted.     Within the posterior one-third upper inner  quadrant of the left breast  centered at the 10 o'clock position on the order of 8 cm from nipple  there is an irregular enhancing mass that measures on the order of 2.5  cm in anterior to posterior dimension, 2.8 cm in the mediolateral  dimension and 2.1 cm in the superior-inferior dimension. A signal void  is seen within the mass that corresponds to a bowtie-shaped metallic  clip. This represents the biopsy-proven site of malignancy.     No other areas of abnormal enhancement or morphology are seen within the  left breast. I see no evidence for abnormal left breast skin, nipple or  chest wall enhancement.  There is no evidence for left axillary or  internal mammary chain adenopathy.     There are no areas of abnormal enhancement or morphology in the right  breast. I see no evidence for abnormal right breast skin, nipple or  chest wall enhancement and there is no evidence for right axillary or  internal mammary chain adenopathy.     IMPRESSION:  1. Biopsy-proven malignancy in the left breast at the 10 o'clock  position in the posterior one-third that measures on the order of 2.8 cm  in greatest dimension with a centrally located bowtie shaped metallic  clip. No other suspicious findings are seen within the left breast and  there is no evidence for left axillary or internal mammary chain  adenopathy.  2. There are no findings suspicious for malignancy in the right breast.     BI-RADS category 6: Known malignancy.     This report was finalized on 12/28/2021 7:24 AM by Dr. Sixto Ramos M.D.     Patient has been scheduled for further recommendations. She has appointment with Dr. Beth Delong at 11 AM today.    January 17, 2022: Patient is s/p surgery left partial mastectomy with sentinel lymph node biopsy      Synoptic Checklist  INVASIVE CARCINOMA OF THE BREAST: Resection (INVASIVE CARCINOMA OF THE BREAST: COMPLETE EXCISION - All Specimens)  Specimen Laterality Left  .  TUMOR  Tumor Site Upper outer  quadrant  Clock position  10 o'clock  Tumor Site Distance from nipple (Centimeters): 5 cm  Histologic Type Invasive carcinoma of no special type (ductal)  Histologic Grade (Gambier Histologic Score)  Glandular (Acinar) / Tubular Differentiation Score 3  Nuclear Pleomorphism Score 2  Mitotic Rate Score 1  Overall Grade Grade 2 (scores of 6 or 7)  Tumor Size Greatest dimension of largest invasive focus (Millimeters): 30 mm  Tumor Focality Single focus of invasive carcinoma  Ductal Carcinoma In Situ (DCIS) Not identified  Lobular Carcinoma In Situ (LCIS) Not identified  Tumor Extent  Lymphovascular Invasion Not identified  Dermal Lymphovascular Invasion Not identified  Microcalcifications Present in invasive carcinoma  Present in non-neoplastic tissue  Treatment Effect in the Breast No known presurgical therapy  .  MARGINS  Margin Status for Invasive Carcinoma All margins negative for invasive carcinoma  Distance from Invasive Carcinoma to Closest Margin 12.8 mm  Closest Margin(s) to Invasive Carcinoma Inferior  Distance from Invasive Carcinoma to Posterior Margin 23 mm  Distance from Invasive Carcinoma to Superior Margin 13 mm  Distance from Invasive Carcinoma to Medial Margin  Comment  The patient's corresponding left breast core biopsy material is pulled and reviewed for comparison ([id number]). The  tumors appear similar; no discordance is noted.  Selected slides from this case are shared in internal consultation with Bam Campbell and  who concur.  [facility]/kds  .  REGIONAL LYMPH NODES  Regional Lymph Node Status All regional lymph nodes negative for tumor  Total Number of Lymph Nodes Examined (sentinel and non-sentinel) 1  Number of [place] Nodes Examined 1  .  PATHOLOGIC STAGE CLASSIFICATION (pTNM, AJCC 8th Edition)    pT Category pT2  Regional Lymph Nodes Modifier (sn): [place] node(s) evaluated.  pN Category pN0    ER %  CO %  HER-2/juan 1±  Ki-67 8%    Oncotype DX 14. No plans for chemo    We  will start Femara    Past Medical History:   Diagnosis Date   • Anesthesia complication     SLOW TO WAKE UP   • Arthritis    • Breast cancer (HCC) 2021    Invasive ductal carcinoma of left breast    • History of kidney cancer     S/P right nephrectomy in 1991   • Hypertension    • Tumor, thyroid    • Type 2 diabetes mellitus (HCC)         Past Surgical History:   Procedure Laterality Date   • BACK SURGERY  1990   • BREAST BIOPSY Right    • BREAST LUMPECTOMY WITH SENTINEL NODE BIOPSY Left 1/17/2022    Procedure: Left ultrasound-guided partial mastectomy and sentinel lymph node biopsy;  Surgeon: Beth Delong MD;  Location: [facility];  Service: General;  Laterality: Left;   • BREAST RECONSTRUCTION Bilateral 1/17/2022    Procedure: Right breast reduction/mastopexy, left breast oncoplastic closure, bilateral chest liposuction;  Surgeon: Simon Castro MD;  Location: [facility];  Service: Plastics;  Laterality: Bilateral;   • COLONOSCOPY     • HYSTERECTOMY  1992   • KNEE SURGERY Right 2001    RECONSTRUCTION   • KNEE SURGERY Left 2003    RECONSTRUCTION   • NEPHRECTOMY Right 1991   • REDUCTION MAMMAPLASTY  2008   • THYROID BIOPSY Bilateral 2010        Current Outpatient Medications on File Prior to Visit   Medication Sig Dispense Refill   • amLODIPine (NORVASC) 10 MG tablet Take 1 tablet by mouth Daily. 90 tablet 0   • Blood Glucose Monitoring Suppl (OneTouch Verio Reflect) w/Device kit See Admin Instructions.     • glucose blood test strip 1 each by Other route Daily. 25 each 2   • Lancets (freestyle) lancets 1 each by Other route Daily. 100 each 4   • letrozole (FEMARA) 2.5 MG tablet Take 1 tablet by mouth Daily. 90 tablet 2   • lisinopril (PRINIVIL,ZESTRIL) 10 MG tablet Take 1 tablet by mouth Daily. 90 tablet 0   • metFORMIN ER (GLUCOPHAGE-XR) 500 MG 24 hr tablet Take 2 tablets by mouth 2 (Two) Times a Day With Meals. 360 tablet 0     No current facility-administered medications on file prior to  visit.        ALLERGIES:    Allergies   Allergen Reactions   • Hydrocodone Anaphylaxis     PER PATIENT   • Penicillins Itching        Social History     Socioeconomic History   • Marital status:      Spouse name: Lucas   • Number of children: 0   • Highest education level: Bachelor's degree (e.g., BA, AB, BS)   Tobacco Use   • Smoking status: Former Smoker     Packs/day: 0.00     Years: 0.00     Pack years: 0.00     Types: Cigarettes, Cigarettes     Quit date: 1991     Years since quittin.7   • Smokeless tobacco: Never Used   Vaping Use   • Vaping Use: Never used   Substance and Sexual Activity   • Alcohol use: Not Currently     Comment: rarely   • Drug use: Never   • Sexual activity: Not Currently     Partners: Male     Birth control/protection: Post-menopausal, None        Family History   Problem Relation Age of Onset   • Diabetes type II Mother    • Hypertension Mother    • Thyroid cancer Mother    • Breast cancer Maternal Grandmother    • Colon cancer Maternal Grandmother    • Diabetes Maternal Grandmother    • Stroke Maternal Grandmother    • Alzheimer's disease Maternal Grandfather    • Malig Hyperthermia Neg Hx         Review of Systems   Constitutional: Positive for fatigue. Negative for appetite change, chills, diaphoresis, fever and unexpected weight change.   HENT: Negative for hearing loss, sore throat and trouble swallowing.    Respiratory: Negative for cough, chest tightness, shortness of breath and wheezing.    Cardiovascular: Negative for chest pain, palpitations and leg swelling.   Gastrointestinal: Positive for nausea. Negative for abdominal distention, abdominal pain, constipation, diarrhea and vomiting.   Genitourinary: Negative for dysuria, frequency, hematuria and urgency.   Musculoskeletal: Negative for joint swelling.        No muscle weakness.   Skin: Negative for rash and wound.   Neurological: Positive for headaches. Negative for seizures, syncope, speech difficulty,  "weakness and numbness.   Hematological: Negative for adenopathy. Does not bruise/bleed easily.   Psychiatric/Behavioral: Positive for sleep disturbance. Negative for behavioral problems, confusion and suicidal ideas.   All other systems reviewed and are negative.  I have reviewed and confirmed the accuracy of the ROS as documented by the MA/LPN/RN Marisa King MD      Family history:  Mother had thyroid cancer. Grandmother had colon cancer and breast cancer on the maternal side and great grandmother had breast cancer on the maternal side. Patient has had thyroid nodule which was biopsied 10 years ago and benign. She had renal cell cancer s/p left nephrectomy.    OB/GYN history  Age of menstruation: 13 years  Age of menopause: 50+ years   0 para 0, no miscarriages  Patient was exposed to birth control pills only the posterior of her marriage and took it just for 1 year  Patient did not take any postmenopausal hormone replacement therapy  Patient had hysterectomy 30 years ago.    Social history: She lost her  just recently and is willing upset about that. She works as a  in [place]. She smoked just a few cigarettes for short very short time when she was in college. She does not drink alcohol. She has no kids.    Past medical history:  Hypertension, followed by her primary care  Diabetes mellitus for which she is on Metformin  Thyroid nodule which on biopsy was benign in the past    Objective     Vitals:    09/15/22 1102   BP: 139/87   Pulse: 75   Resp: 16   Temp: 96.9 °F (36.1 °C)   TempSrc: Infrared   SpO2: 96%   Weight: 92.9 kg (204 lb 11.2 oz)   Height: 170.2 cm (67.01\")   PainSc: 0-No pain     Current Status 9/15/2022   ECOG score 0       Physical Exam      Patient screened positive for depression based on a PHQ-9 score of 4 on 2022. Follow-up recommendations include: PCP managing depression.      CONSTITUTIONAL:  Vital signs reviewed.  No distress, looks " comfortable.  EYES:  Conjunctivae and lids unremarkable.  PERRLA  EARS,NOSE,MOUTH,THROAT:  Ears and nose appear unremarkable.  Lips, teeth, gums appear unremarkable.  RESPIRATORY:  Normal respiratory effort.  Lungs clear to auscultation bilaterally.  BREAST: Right breast: No skin changes, no evidence of breast mass, no nipple discharge, no evidence of any right axillary adenopathy or right supraclavicular adenopathy  Left breast: No evidence of any skin changes, no evidence of any left breast mass and no evidence of left nipple discharge as well as no left axillary adenopathy or left supraclavicular adenopathy.  CARDIOVASCULAR:  Normal S1, S2.  No murmurs rubs or gallops.  No significant lower extremity edema.  GASTROINTESTINAL: Abdomen appears unremarkable.  Nontender.  No hepatomegaly.  No splenomegaly.  LYMPHATIC:  No cervical, supraclavicular, axillary lymphadenopathy.  SKIN:  Warm.  No rashes.  PSYCHIATRIC:  Normal judgment and insight.  Normal mood and affect.    I have reexamined the patient and the results are consistent with the previously documented exam. Marisa King MD   RECENT LABS:  Hematology WBC   Date Value Ref Range Status   09/15/2022 7.41 3.40 - 10.80 10*3/mm3 Final   03/22/2022 6.6 3.4 - 10.8 x10E3/uL Final     RBC   Date Value Ref Range Status   09/15/2022 4.83 3.77 - 5.28 10*6/mm3 Final   03/22/2022 4.99 3.77 - 5.28 x10E6/uL Final     Hemoglobin   Date Value Ref Range Status   09/15/2022 14.9 12.0 - 15.9 g/dL Final     Hematocrit   Date Value Ref Range Status   09/15/2022 44.9 34.0 - 46.6 % Final     Platelets   Date Value Ref Range Status   09/15/2022 156 140 - 450 10*3/mm3 Final     Comment:     Some plt clumping          Assessment & Plan     1.  T2 N0 M0, anatomic stage IIa , prognostic stage Ib invasive ductal carcinoma of the left breast at 10 o'clock position, grade 2, ER 91 x 100%, CA 91/100%, HER-2/juan 1+, Ki-67 8%.  No evidence of lymphovascular space invasion or perineural  invasion  · Patient has not had screening mammogram for 25 years  · She felt a mass in September 2021 when she was holding the cat and noticed pain in the left breast upper inner quadrant and then she felt the mass, she was seen by her primary care physician  · Diagnostic mammogram, November 10, 2021: Bilateral showed a 2.5 x 2.6 x 2.3 cm mass in the posterior one third upper inner quadrant of the left breast.  Internal coarse calcification and areas of fat necrosis were noted.  No other dominant no masses were seen.  There was no skin thickening, nipple retraction or axillary adenopathy  · Ultrasound showed a 2.4 x 1.9 x 1.4 cm mass in the 10 o'clock position 5 cm from the nipple  · December 2, 2021 ultrasound-guided left breast biopsy showed invasive ductal carcinoma, grade 2 with a [place] score of 6, %, %, HER-2/juan 1+, Ki-67 8%  · December 23, 2021: Patient underwent MRI of the breast which showed a 2.5 x 2.8 x 2.1 cm irregular enhancing mass at 10 o'clock position, 8 cm from the nipple.  There was no evidence of any left axillary or internal mammary chain lymphadenopathy.  Right breast was negative  · I had a lengthy discussion with the patient about the nature of the breast cancer that was present.  We told her that was the most common type of breast cancer and given that it was ER/KS positive HER-2/juan negative without any lymphovascular or perineural invasion and Ki-67 of 8% it does not appear to be a highly aggressive disease.  He states that the lump did not increase in size since she first noticed it.  · We then discussed about options of therapy of surgery upfront, followed by evaluation for Oncotype DX testing to see if she will be eligible for any chemotherapy and also will be eligible for endocrine therapy.  · Patient is to see Dr. Beth Delong  today and we will await her input, I did discuss that likely the best option is for consideration of surgery upfront and will await to  hear from Dr. Delong  · However patient has family history of breast cancer in her maternal grandmother and great-grandmother and colon cancer in her maternal grandmother, thyroid cancer in her mother and personal history of renal cell cancer.  · We then discussed about obtaining INVITAE genetic test today and has been ordered in our office for the 9 breast cancer gene with reflux to be 47 gene panel.  · I told her surgical options will be discussed by Dr. Delong.  · January 17, 2022 Left breast lumpectomy, grade 2 invasive ductal carcinoma measuring 30 mm with perineural invasion and microcalcifications identified adjacent to the clip and biopsy site change.  Focal minimal associated atypical ductal hyperplasia but no definite in situ carcinoma is identified all margins are free of tumor closest margin being 2.8 mm.  Incidental fibroadenoma 2.6 mm maximally with focal fat necrosis.  0 of 1 sentinel lymph node negative.  Right breast mammoplasty was benign  · February 25, 2022: Patient already seen by radiation oncology with plans to start radiation.  · Oncotype DX testing shows score of 14, her benefit from chemo is less than 1%  · February 2022: Started Femara and tolerating well  · September 15, 2022: Patient tolerating Femara very well      2.  Family history of malignancy:  · However patient has family history of breast cancer in her maternal grandmother and great-grandmother and colon cancer in her maternal grandmother, thyroid cancer in her mother and personal history of renal cell cancer.  · We then discussed about obtaining INVITAE genetic test today and has been ordered in our office for the 9 breast cancer gene with reflux to be 47 gene panel.  · INVITAE genetic test negative for 47 genes    3.  Hypertension currently on lisinopril followed by primary care physician.  Today mildly elevated blood pressure  · Stable    4.  History of diabetes mellitus currently on Metformin    5.  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  History of renal cell cancer s/p left nephrectomy without evidence of recurrence and at age 31    7.  Osteopenia: Reviewed the bone density which is within normal limits    Plan  · Continue Femara  · Continue calcium and vitamin D supplements  · Patient followed up with LYNNE Tuttle and scheduled for mammogram in 6 months from May 2022  · Follow-up with me in 6 months with labs    Monitoring high risk medication    MD Dr. Zarina Hernandez Dr., Dr.

## 2022-09-28 ENCOUNTER — OFFICE VISIT (OUTPATIENT)
Dept: FAMILY MEDICINE CLINIC | Facility: CLINIC | Age: 62
End: 2022-09-28

## 2022-09-28 VITALS
HEART RATE: 87 BPM | DIASTOLIC BLOOD PRESSURE: 74 MMHG | WEIGHT: 203.4 LBS | BODY MASS INDEX: 31.92 KG/M2 | HEIGHT: 67 IN | SYSTOLIC BLOOD PRESSURE: 130 MMHG | TEMPERATURE: 97.8 F | OXYGEN SATURATION: 98 %

## 2022-09-28 DIAGNOSIS — Z79.899 ENCOUNTER FOR LONG-TERM (CURRENT) USE OF MEDICATIONS: ICD-10-CM

## 2022-09-28 DIAGNOSIS — I10 PRIMARY HYPERTENSION: ICD-10-CM

## 2022-09-28 DIAGNOSIS — E11.9 TYPE 2 DIABETES MELLITUS WITHOUT COMPLICATION, WITHOUT LONG-TERM CURRENT USE OF INSULIN: Primary | ICD-10-CM

## 2022-09-28 LAB
ALBUMIN SERPL-MCNC: 4.7 G/DL (ref 3.5–5.2)
ALBUMIN/GLOB SERPL: 2.9 G/DL
ALP SERPL-CCNC: 123 U/L (ref 39–117)
ALT SERPL-CCNC: 37 U/L (ref 1–33)
AST SERPL-CCNC: 19 U/L (ref 1–32)
BILIRUB SERPL-MCNC: 0.4 MG/DL (ref 0–1.2)
BUN SERPL-MCNC: 12 MG/DL (ref 8–23)
BUN/CREAT SERPL: 18.2 (ref 7–25)
CALCIUM SERPL-MCNC: 10.3 MG/DL (ref 8.6–10.5)
CHLORIDE SERPL-SCNC: 100 MMOL/L (ref 98–107)
CO2 SERPL-SCNC: 24 MMOL/L (ref 22–29)
CREAT SERPL-MCNC: 0.66 MG/DL (ref 0.57–1)
EGFRCR SERPLBLD CKD-EPI 2021: 100.6 ML/MIN/1.73
EXPIRATION DATE: ABNORMAL
GLOBULIN SER CALC-MCNC: 1.6 GM/DL
GLUCOSE SERPL-MCNC: 151 MG/DL (ref 65–99)
HBA1C MFR BLD: 7.8 %
Lab: ABNORMAL
POTASSIUM SERPL-SCNC: 4.5 MMOL/L (ref 3.5–5.2)
PROT SERPL-MCNC: 6.3 G/DL (ref 6–8.5)
SODIUM SERPL-SCNC: 140 MMOL/L (ref 136–145)

## 2022-09-28 PROCEDURE — 83036 HEMOGLOBIN GLYCOSYLATED A1C: CPT | Performed by: FAMILY MEDICINE

## 2022-09-28 PROCEDURE — 99214 OFFICE O/P EST MOD 30 MIN: CPT | Performed by: FAMILY MEDICINE

## 2022-09-28 RX ORDER — SEMAGLUTIDE 1.34 MG/ML
0.25 INJECTION, SOLUTION SUBCUTANEOUS WEEKLY
Qty: 1.5 ML | Refills: 0 | Status: SHIPPED | OUTPATIENT
Start: 2022-09-28 | End: 2022-11-07 | Stop reason: SDUPTHER

## 2022-09-28 NOTE — PROGRESS NOTES
"Chief Complaint  Diabetes and Hypertension    Subjective        Daysi Lugo presents to [facility]  History of Present Illness  Patient is here today to follow-up on her chronic health conditions:    Hypertension.  She is currently taking lisinopril to 10 mg daily and amlodipine 10 mg daily.  Patient states that she has no side effects. BP are well controlled.    Type 2 diabetes.  She was diagnosed by a A1c of 8.0 on September 23, 2021.  She is taking metformin 1000mg BID.  She is tolerating the medication well and denies any side effects.  Patient has noticed higher blood sugar readings lately.  She states she has not been as good with her diet lately      Objective   Vital Signs:  /74   Pulse 87   Temp 97.8 °F (36.6 °C)   Ht 170.2 cm (67\")   Wt 92.3 kg (203 lb 6.4 oz)   SpO2 98%   BMI 31.86 kg/m²   Estimated body mass index is 31.86 kg/m² as calculated from the following:    Height as of this encounter: 170.2 cm (67\").    Weight as of this encounter: 92.3 kg (203 lb 6.4 oz).          Physical Exam  Vitals and nursing note reviewed.   Constitutional:       Appearance: Normal appearance. She is well-developed. She is obese.   HENT:      Head: Normocephalic and atraumatic.      Right Ear: External ear normal.      Left Ear: External ear normal.      Nose: Nose normal.   Eyes:      General: No scleral icterus.     Conjunctiva/sclera: Conjunctivae normal.   Cardiovascular:      Rate and Rhythm: Normal rate and regular rhythm.      Heart sounds: Normal heart sounds.   Pulmonary:      Effort: Pulmonary effort is normal.      Breath sounds: Normal breath sounds.   Musculoskeletal:      Cervical back: Normal range of motion and neck supple.      Right lower leg: No edema.      Left lower leg: No edema.   Lymphadenopathy:      Cervical: No cervical adenopathy.   Skin:     General: Skin is warm and dry.      Findings: No rash.   Neurological:      Mental Status: She is alert and " oriented to person, place, and time.   Psychiatric:         Mood and Affect: Mood normal.         Behavior: Behavior normal.         Thought Content: Thought content normal.         Judgment: Judgment normal.        Result Review :  The following data was reviewed by: Zarina Tineo DO on 09/28/2022:  Common labs    Common Labs 6/27/22 9/15/22 9/15/22 9/28/22     1028 1028    Glucose   139 (A)    BUN   14    Creatinine   0.83    Sodium   139    Potassium   5.3 (A)    Chloride   100    Calcium   10.8 (A)    Albumin   4.50    Total Bilirubin   0.4    Alkaline Phosphatase   110    AST (SGOT)   46 (A)    ALT (SGPT)   62 (A)    WBC  7.41     Hemoglobin  14.9     Hematocrit  44.9     Platelets  156     Hemoglobin A1C 7.1   7.8   (A) Abnormal value       Comments are available for some flowsheets but are not being displayed.                         Assessment and Plan   Diagnoses and all orders for this visit:    1. Type 2 diabetes mellitus without complication, without long-term current use of insulin (HCC) (Primary)  -     POC Glycosylated Hemoglobin (Hb A1C)  -     Semaglutide,0.25 or 0.5MG/DOS, (Ozempic, 0.25 or 0.5 MG/DOSE,) 2 MG/1.5ML solution pen-injector; Inject 0.25 mg under the skin into the appropriate area as directed 1 (One) Time Per Week.  Dispense: 1.5 mL; Refill: 0  -     Comprehensive Metabolic Panel    2. Primary hypertension  -     Comprehensive Metabolic Panel    3. Encounter for long-term (current) use of medications  -     Comprehensive Metabolic Panel    Uncontrolled diabetes.  The patient will start Ozempic.  In 1 month if the patient is tolerating the Ozempic well we will increase her dose.  She was instructed to contact me.  If she has any side effects or other issues she should contact me sooner.    Chronic stable hypertension.  Surveillance labs were obtained today and any medication changes will be made based on lab results and will be called to the patient later this week.         Follow Up    Return in about 3 months (around 12/28/2022) for HTN, Diabetes.  Patient was given instructions and counseling regarding her condition or for health maintenance advice. Please see specific information pulled into the AVS if appropriate.

## 2022-09-29 DIAGNOSIS — I10 PRIMARY HYPERTENSION: ICD-10-CM

## 2022-09-29 RX ORDER — AMLODIPINE BESYLATE 10 MG/1
10 TABLET ORAL DAILY
Qty: 90 TABLET | Refills: 0 | Status: SHIPPED | OUTPATIENT
Start: 2022-09-29 | End: 2022-12-27

## 2022-10-10 ENCOUNTER — OFFICE VISIT (OUTPATIENT)
Dept: PLASTIC SURGERY | Facility: CLINIC | Age: 62
End: 2022-10-10

## 2022-10-10 VITALS
BODY MASS INDEX: 32.22 KG/M2 | SYSTOLIC BLOOD PRESSURE: 134 MMHG | DIASTOLIC BLOOD PRESSURE: 88 MMHG | HEIGHT: 67 IN | OXYGEN SATURATION: 97 % | TEMPERATURE: 98.4 F | WEIGHT: 205.3 LBS | HEART RATE: 78 BPM

## 2022-10-10 DIAGNOSIS — N65.0 DEFORMITY AND DISPROPORTION OF RECONSTRUCTED BREAST: Primary | ICD-10-CM

## 2022-10-10 DIAGNOSIS — N65.1 DEFORMITY AND DISPROPORTION OF RECONSTRUCTED BREAST: Primary | ICD-10-CM

## 2022-10-10 PROCEDURE — 99212 OFFICE O/P EST SF 10 MIN: CPT | Performed by: SURGERY

## 2022-10-10 RX ORDER — ONDANSETRON 4 MG/1
4 TABLET, FILM COATED ORAL EVERY 8 HOURS PRN
Qty: 20 TABLET | Refills: 0 | Status: SHIPPED | OUTPATIENT
Start: 2022-10-10 | End: 2022-10-20

## 2022-10-10 RX ORDER — CLINDAMYCIN HYDROCHLORIDE 150 MG/1
150 CAPSULE ORAL 4 TIMES DAILY
Qty: 40 CAPSULE | Refills: 0 | Status: SHIPPED | OUTPATIENT
Start: 2022-10-10 | End: 2022-10-20

## 2022-10-10 RX ORDER — OXYCODONE HYDROCHLORIDE AND ACETAMINOPHEN 5; 325 MG/1; MG/1
1-2 TABLET ORAL EVERY 6 HOURS PRN
Qty: 28 TABLET | Refills: 0 | Status: SHIPPED | OUTPATIENT
Start: 2022-10-10 | End: 2022-10-20

## 2022-10-14 ENCOUNTER — HOSPITAL ENCOUNTER (OUTPATIENT)
Dept: MAMMOGRAPHY | Facility: HOSPITAL | Age: 62
Discharge: HOME | End: 2022-10-14
Admitting: NURSE PRACTITIONER

## 2022-10-14 DIAGNOSIS — C50.212 MALIGNANT NEOPLASM OF UPPER-INNER QUADRANT OF LEFT BREAST IN FEMALE, ESTROGEN RECEPTOR POSITIVE: ICD-10-CM

## 2022-10-14 DIAGNOSIS — Z17.0 MALIGNANT NEOPLASM OF UPPER-INNER QUADRANT OF LEFT BREAST IN FEMALE, ESTROGEN RECEPTOR POSITIVE: ICD-10-CM

## 2022-10-14 PROCEDURE — 77067 SCR MAMMO BI INCL CAD: CPT

## 2022-10-14 PROCEDURE — 77063 BREAST TOMOSYNTHESIS BI: CPT

## 2022-10-19 ENCOUNTER — APPOINTMENT (OUTPATIENT)
Dept: PREADMISSION TESTING | Facility: HOSPITAL | Age: 62
End: 2022-10-19

## 2022-10-20 ENCOUNTER — TELEPHONE (OUTPATIENT)
Dept: ONCOLOGY | Facility: HOSPITAL | Age: 62
End: 2022-10-20

## 2022-10-20 ENCOUNTER — PRE-ADMISSION TESTING (OUTPATIENT)
Dept: PREADMISSION TESTING | Facility: HOSPITAL | Age: 62
End: 2022-10-20

## 2022-10-20 ENCOUNTER — APPOINTMENT (OUTPATIENT)
Dept: ONCOLOGY | Facility: HOSPITAL | Age: 62
End: 2022-10-20

## 2022-10-20 ENCOUNTER — OFFICE VISIT (OUTPATIENT)
Dept: SURGERY | Facility: CLINIC | Age: 62
End: 2022-10-20

## 2022-10-20 ENCOUNTER — APPOINTMENT (OUTPATIENT)
Dept: LAB | Facility: HOSPITAL | Age: 62
End: 2022-10-20

## 2022-10-20 VITALS
HEART RATE: 92 BPM | TEMPERATURE: 97 F | DIASTOLIC BLOOD PRESSURE: 90 MMHG | OXYGEN SATURATION: 96 % | WEIGHT: 203 LBS | SYSTOLIC BLOOD PRESSURE: 142 MMHG | RESPIRATION RATE: 20 BRPM | HEIGHT: 67 IN | BODY MASS INDEX: 31.86 KG/M2

## 2022-10-20 VITALS
RESPIRATION RATE: 17 BRPM | HEART RATE: 78 BPM | WEIGHT: 203 LBS | BODY MASS INDEX: 31.86 KG/M2 | DIASTOLIC BLOOD PRESSURE: 92 MMHG | OXYGEN SATURATION: 98 % | HEIGHT: 67 IN | SYSTOLIC BLOOD PRESSURE: 154 MMHG

## 2022-10-20 DIAGNOSIS — C50.212 MALIGNANT NEOPLASM OF UPPER-INNER QUADRANT OF LEFT BREAST IN FEMALE, ESTROGEN RECEPTOR POSITIVE: Primary | ICD-10-CM

## 2022-10-20 DIAGNOSIS — Z17.0 MALIGNANT NEOPLASM OF UPPER-INNER QUADRANT OF LEFT BREAST IN FEMALE, ESTROGEN RECEPTOR POSITIVE: Primary | ICD-10-CM

## 2022-10-20 DIAGNOSIS — C50.212 MALIGNANT NEOPLASM OF UPPER-INNER QUADRANT OF LEFT BREAST IN FEMALE, ESTROGEN RECEPTOR POSITIVE: ICD-10-CM

## 2022-10-20 DIAGNOSIS — E83.52 HYPERCALCEMIA: ICD-10-CM

## 2022-10-20 DIAGNOSIS — Z17.0 MALIGNANT NEOPLASM OF UPPER-INNER QUADRANT OF LEFT BREAST IN FEMALE, ESTROGEN RECEPTOR POSITIVE: ICD-10-CM

## 2022-10-20 LAB
ALBUMIN SERPL-MCNC: 4.2 G/DL (ref 3.5–5.2)
ALBUMIN/GLOB SERPL: 2 G/DL
ALP SERPL-CCNC: 104 U/L (ref 39–117)
ALT SERPL W P-5'-P-CCNC: 35 U/L (ref 1–33)
ANION GAP SERPL CALCULATED.3IONS-SCNC: 11.6 MMOL/L (ref 5–15)
AST SERPL-CCNC: 19 U/L (ref 1–32)
BASOPHILS # BLD AUTO: 0.02 10*3/MM3 (ref 0–0.2)
BASOPHILS NFR BLD AUTO: 0.4 % (ref 0–1.5)
BILIRUB SERPL-MCNC: 0.3 MG/DL (ref 0–1.2)
BUN SERPL-MCNC: 13 MG/DL (ref 8–23)
BUN/CREAT SERPL: 17.1 (ref 7–25)
CA-I BLD-MCNC: 5.4 MG/DL (ref 4.6–5.4)
CA-I SERPL ISE-MCNC: 1.34 MMOL/L (ref 1.15–1.35)
CALCIUM SPEC-SCNC: 9.7 MG/DL (ref 8.6–10.5)
CHLORIDE SERPL-SCNC: 102 MMOL/L (ref 98–107)
CO2 SERPL-SCNC: 26.4 MMOL/L (ref 22–29)
CREAT SERPL-MCNC: 0.76 MG/DL (ref 0.57–1)
DEPRECATED RDW RBC AUTO: 41.1 FL (ref 37–54)
EGFRCR SERPLBLD CKD-EPI 2021: 89.8 ML/MIN/1.73
EOSINOPHIL # BLD AUTO: 0.59 10*3/MM3 (ref 0–0.4)
EOSINOPHIL NFR BLD AUTO: 10.5 % (ref 0.3–6.2)
ERYTHROCYTE [DISTWIDTH] IN BLOOD BY AUTOMATED COUNT: 12.8 % (ref 12.3–15.4)
GLOBULIN UR ELPH-MCNC: 2.1 GM/DL
GLUCOSE SERPL-MCNC: 170 MG/DL (ref 65–99)
HCT VFR BLD AUTO: 39.9 % (ref 34–46.6)
HGB BLD-MCNC: 13.8 G/DL (ref 12–15.9)
IMM GRANULOCYTES # BLD AUTO: 0.02 10*3/MM3 (ref 0–0.05)
IMM GRANULOCYTES NFR BLD AUTO: 0.4 % (ref 0–0.5)
LYMPHOCYTES # BLD AUTO: 1.01 10*3/MM3 (ref 0.7–3.1)
LYMPHOCYTES NFR BLD AUTO: 17.9 % (ref 19.6–45.3)
MCH RBC QN AUTO: 31.1 PG (ref 26.6–33)
MCHC RBC AUTO-ENTMCNC: 34.6 G/DL (ref 31.5–35.7)
MCV RBC AUTO: 89.9 FL (ref 79–97)
MONOCYTES # BLD AUTO: 0.4 10*3/MM3 (ref 0.1–0.9)
MONOCYTES NFR BLD AUTO: 7.1 % (ref 5–12)
NEUTROPHILS NFR BLD AUTO: 3.6 10*3/MM3 (ref 1.7–7)
NEUTROPHILS NFR BLD AUTO: 63.7 % (ref 42.7–76)
NRBC BLD AUTO-RTO: 0 /100 WBC (ref 0–0.2)
PLATELET # BLD AUTO: 242 10*3/MM3 (ref 140–450)
PMV BLD AUTO: 10.5 FL (ref 6–12)
POTASSIUM SERPL-SCNC: 4.2 MMOL/L (ref 3.5–5.2)
PROT SERPL-MCNC: 6.3 G/DL (ref 6–8.5)
PTH-INTACT SERPL-MCNC: 26.5 PG/ML (ref 15–65)
RBC # BLD AUTO: 4.44 10*6/MM3 (ref 3.77–5.28)
SODIUM SERPL-SCNC: 140 MMOL/L (ref 136–145)
WBC NRBC COR # BLD: 5.64 10*3/MM3 (ref 3.4–10.8)

## 2022-10-20 PROCEDURE — 82330 ASSAY OF CALCIUM: CPT

## 2022-10-20 PROCEDURE — 36415 COLL VENOUS BLD VENIPUNCTURE: CPT

## 2022-10-20 PROCEDURE — 83970 ASSAY OF PARATHORMONE: CPT

## 2022-10-20 PROCEDURE — 99213 OFFICE O/P EST LOW 20 MIN: CPT | Performed by: SURGERY

## 2022-10-20 PROCEDURE — 85025 COMPLETE CBC W/AUTO DIFF WBC: CPT

## 2022-10-20 PROCEDURE — 80053 COMPREHEN METABOLIC PANEL: CPT

## 2022-10-20 RX ORDER — ONDANSETRON 4 MG/1
4 TABLET, FILM COATED ORAL EVERY 8 HOURS PRN
COMMUNITY
End: 2022-10-31 | Stop reason: SDUPTHER

## 2022-10-20 RX ORDER — CHLORHEXIDINE GLUCONATE 500 MG/1
1 CLOTH TOPICAL
COMMUNITY
End: 2022-11-14

## 2022-10-20 RX ORDER — OXYCODONE HYDROCHLORIDE AND ACETAMINOPHEN 5; 325 MG/1; MG/1
1 TABLET ORAL EVERY 6 HOURS PRN
COMMUNITY
End: 2022-10-31 | Stop reason: SDUPTHER

## 2022-10-20 NOTE — TELEPHONE ENCOUNTER
Patient schedule for RN review to recheck calcium level this afternoon. She had pre admission testing this morning and calcium WNL. R/w Zarina RN and we will cancel patient's appt this afternoon. Pt is to continue what she has been doing and will reevaluate and next MD visit. Advised pt of normal calcium level and that she doesn't need to come in this afternoon for labs, she v/u.    Lab Results   Component Value Date    GLUCOSE 170 (H) 10/20/2022    BUN 13 10/20/2022    CREATININE 0.76 10/20/2022    EGFRIFNONA 64 02/25/2022    EGFRIFAFRI 93 02/23/2022    BCR 17.1 10/20/2022    K 4.2 10/20/2022    CO2 26.4 10/20/2022    CALCIUM 9.7 10/20/2022    PROTENTOTREF 6.3 09/28/2022    ALBUMIN 4.20 10/20/2022    LABIL2 2.9 09/28/2022    AST 19 10/20/2022    ALT 35 (H) 10/20/2022

## 2022-10-20 NOTE — DISCHARGE INSTRUCTIONS
Take the following medications the morning of surgery:    amlodipine    If you are on prescription narcotic pain medication to control your pain you may also take that medication the morning of surgery.    General Instructions:  Do not eat solid food after midnight the night before surgery.  You may drink clear liquids day of surgery but must stop at least one hour before your hospital arrival time.  It is beneficial for you to have a clear drink that contains carbohydrates the day of surgery.  We suggest a 12 to 20 ounce bottle of Gatorade or Powerade for non-diabetic patients or a 12 to 20 ounce bottle of G2 or Powerade Zero for diabetic patients. (Pediatric patients, are not advised to drink a 12 to 20 ounce carbohydrate drink)    Clear liquids are liquids you can see through.  Nothing red in color.     Plain water                               Sports drinks  Sodas                                   Gelatin (Jell-O)  Fruit juices without pulp such as white grape juice and apple juice  Popsicles that contain no fruit or yogurt  Tea or coffee (no cream or milk added)  Gatorade / Powerade  G2 / Powerade Zero    Infants may have breast milk up to four hours before surgery.  Infants drinking formula may drink formula up to six hours before surgery.   Patients who avoid smoking, chewing tobacco and alcohol for 4 weeks prior to surgery have a reduced risk of post-operative complications.  Quit smoking as many days before surgery as you can.  Do not smoke, use chewing tobacco or drink alcohol the day of surgery.   If applicable bring your C-PAP/ BI-PAP machine.  Bring any papers given to you in the doctor’s office.  Wear clean comfortable clothes.  Do not wear contact lenses, false eyelashes or make-up.  Bring a case for your glasses.   Bring crutches or walker if applicable.  Remove all piercings.  Leave jewelry and any other valuables at home.  Hair extensions with metal clips must be removed prior to surgery.  The  Pre-Admission Testing nurse will instruct you to bring medications if unable to obtain an accurate list in Pre-Admission Testing.        If you were given a blood bank ID arm band remember to bring it with you the day of surgery.    Preventing a Surgical Site Infection:  For 2 to 3 days before surgery, avoid shaving with a razor because the razor can irritate skin and make it easier to develop an infection.    Any areas of open skin can increase the risk of a post-operative wound infection by allowing bacteria to enter and travel throughout the body.  Notify your surgeon if you have any skin wounds / rashes even if it is not near the expected surgical site.  The area will need assessed to determine if surgery should be delayed until it is healed.  The night prior to surgery shower using a fresh bar of anti-bacterial soap (such as Dial) and clean washcloth.  Sleep in a clean bed with clean clothing.  Do not allow pets to sleep with you.  Shower on the morning of surgery using a fresh bar of anti-bacterial soap (such as Dial) and clean washcloth.  Dry with a clean towel and dress in clean clothing.  Ask your surgeon if you will be receiving antibiotics prior to surgery.  Make sure you, your family, and all healthcare providers clean their hands with soap and water or an alcohol based hand  before caring for you or your wound.    Day of surgery:10/26/2022   0530  Your arrival time is approximately two hours before your scheduled surgery time.  Upon arrival, a Pre-op nurse and Anesthesiologist will review your health history, obtain vital signs, and answer questions you may have.  The only belongings needed at this time will be a list of your home medications and if applicable your C-PAP/BI-PAP machine.  A Pre-op nurse will start an IV and you may receive medication in preparation for surgery, including something to help you relax.     Please be aware that surgery does come with discomfort.  We want to make  every effort to control your discomfort so please discuss any uncontrolled symptoms with your nurse.   Your doctor will most likely have prescribed pain medications.      If you are going home after surgery you will receive individualized written care instructions before being discharged.  A responsible adult must drive you to and from the hospital on the day of your surgery and stay with you for 24 hours.  Discharge prescriptions can be filled by the hospital pharmacy during regular pharmacy hours.  If you are having surgery late in the day/evening your prescription may be e-prescribed to your pharmacy.  Please verify your pharmacy hours or chose a 24 hour pharmacy to avoid not having access to your prescription because your pharmacy has closed for the day.    If you are staying overnight following surgery, you will be transported to your hospital room following the recovery period.  [facility] has all private rooms.    If you have any questions please call Pre-Admission Testing at [phone].  Deductibles and co-payments are collected on the day of service. Please be prepared to pay the required co-pay, deductible or deposit on the day of service as defined by your plan.    Call your surgeon immediately if you experience any of the following symptoms:  Sore Throat  Shortness of Breath or difficulty breathing  Cough  Chills  Body soreness or muscle pain  Headache  Fever  New loss of taste or smell  Do not arrive for your surgery ill.  Your procedure will need to be rescheduled to another time.  You will need to call your physician before the day of surgery to avoid any unnecessary exposure to hospital staff as well as other patients.   CHLORHEXIDINE CLOTH INSTRUCTIONS  The morning of surgery follow these instructions using the Chlorhexidine cloths you've been given.  These steps reduce bacteria on the body.  Do not use the cloths near your eyes, ears mouth, genitalia or on open wounds.  Throw the  cloths away after use but do not try to flush them down a toilet.      Open and remove one cloth at a time from the package.    Leave the cloth unfolded and begin the bathing.  Massage the skin with the cloths using gentle pressure to remove bacteria.  Do not scrub harshly.   Follow the steps below with one 2% CHG cloth per area (6 total cloths).  One cloth for neck, shoulders and chest.  One cloth for both arms, hands, fingers and underarms (do underarms last).  One cloth for the abdomen followed by groin.  One cloth for right leg and foot including between the toes.  One cloth for left leg and foot including between the toes.  The last cloth is to be used for the back of the neck, back and buttocks.    Allow the CHG to air dry 3 minutes on the skin which will give it time to work and decrease the chance of irritation.  The skin may feel sticky until it is dry.  Do not rinse with water or any other liquid or you will lose the beneficial effects of the CHG.  If mild skin irritation occurs, do rinse the skin to remove the CHG.  Report this to the nurse at time of admission.  Do not apply lotions, creams, ointments, deodorants or perfumes after using the clothes. Dress in clean clothes before coming to the hospital.

## 2022-10-24 NOTE — PROGRESS NOTES
"Post-op Follow-up (1m post op ) Answers for HPI/ROS submitted by the patient on 10/24/2022  Please describe your symptoms.: breast reconstruction  Have you had these symptoms before?: No  How long have you been having these symptoms?: Greater than 2 weeks  What is the primary reason for your visit?: Other           History of Present Illness  Daysi Lugo is a 62 y.o. female who presents to [facility] for Pre-Operative Examination for BREAST RECONSTRUCTION REVISION with skin tailoring, fat graft and induration resection on 10/26/2022.     She is here for 1wk post op follow up, she states her pain level is 7/10 in the area where she had lipo done for fat grafting. She would like refill on Zofran & Percocet today if possible.     Subjective        Hydrocodone and Penicillins  Allergies Reconciled.    Review of Systems   All review of system has been reviewed and it  is negative except the ones note above.     Objective     /72 (BP Location: Right arm)   Pulse 83   Temp 97.7 °F (36.5 °C) (Temporal)   Ht 170.2 cm (67\")   Wt 92.7 kg (204 lb 6.4 oz)   SpO2 94%   BMI 32.01 kg/m²     Body mass index is 32.01 kg/m².    Physical Exam:   Expected bruise and swelling. Incisions c/d/i               Assessment and Plan      Diagnoses and all orders for this visit:    1. 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  Dispense: 10 tablet; Refill: 0    Other orders  -     ondansetron (ZOFRAN) 4 MG tablet; Take 1 tablet by mouth Every 8 (Eight) Hours As Needed for Nausea or Vomiting.  Dispense: 18 tablet; Refill: 0        Plan:   Doing well, refilling nausea and pain meds. Follow up in 1 m        Follow Up     No follow-ups on file.    Patient was given instructions and counseling regarding her condition. Please see " specific information pulled into the AVS if appropriate.     Simon Castro MD  10/31/2022

## 2022-10-25 ENCOUNTER — TELEPHONE (OUTPATIENT)
Dept: PLASTIC SURGERY | Facility: CLINIC | Age: 62
End: 2022-10-25

## 2022-10-26 ENCOUNTER — ANESTHESIA (OUTPATIENT)
Dept: PERIOP | Facility: HOSPITAL | Age: 62
End: 2022-10-26

## 2022-10-26 ENCOUNTER — HOSPITAL ENCOUNTER (OUTPATIENT)
Facility: HOSPITAL | Age: 62
Setting detail: HOSPITAL OUTPATIENT SURGERY
Discharge: HOME | End: 2022-10-26
Attending: SURGERY | Admitting: SURGERY

## 2022-10-26 ENCOUNTER — ANESTHESIA EVENT (OUTPATIENT)
Dept: PERIOP | Facility: HOSPITAL | Age: 62
End: 2022-10-26

## 2022-10-26 VITALS
TEMPERATURE: 97.6 F | OXYGEN SATURATION: 98 % | DIASTOLIC BLOOD PRESSURE: 90 MMHG | HEART RATE: 93 BPM | HEIGHT: 67 IN | BODY MASS INDEX: 31.79 KG/M2 | RESPIRATION RATE: 16 BRPM | SYSTOLIC BLOOD PRESSURE: 123 MMHG

## 2022-10-26 DIAGNOSIS — N65.1 DEFORMITY AND DISPROPORTION OF RECONSTRUCTED BREAST: ICD-10-CM

## 2022-10-26 DIAGNOSIS — N65.0 DEFORMITY AND DISPROPORTION OF RECONSTRUCTED BREAST: ICD-10-CM

## 2022-10-26 LAB
GLUCOSE BLDC GLUCOMTR-MCNC: 160 MG/DL (ref 70–130)
GLUCOSE BLDC GLUCOMTR-MCNC: 189 MG/DL (ref 70–130)

## 2022-10-26 PROCEDURE — 25010000002 FENTANYL CITRATE (PF) 100 MCG/2ML SOLUTION: Performed by: NURSE ANESTHETIST, CERTIFIED REGISTERED

## 2022-10-26 PROCEDURE — 19380 REVJ RECONSTRUCTED BREAST: CPT | Performed by: SURGERY

## 2022-10-26 PROCEDURE — 25010000002 ONDANSETRON PER 1 MG: Performed by: NURSE ANESTHETIST, CERTIFIED REGISTERED

## 2022-10-26 PROCEDURE — 25010000002 NEOSTIGMINE 5 MG/10ML SOLUTION: Performed by: NURSE ANESTHETIST, CERTIFIED REGISTERED

## 2022-10-26 PROCEDURE — 82962 GLUCOSE BLOOD TEST: CPT

## 2022-10-26 PROCEDURE — 15771 GRFG AUTOL FAT LIPO 50 CC/<: CPT | Performed by: SURGERY

## 2022-10-26 PROCEDURE — 25010000002 PROPOFOL 10 MG/ML EMULSION: Performed by: NURSE ANESTHETIST, CERTIFIED REGISTERED

## 2022-10-26 PROCEDURE — 88305 TISSUE EXAM BY PATHOLOGIST: CPT | Performed by: SURGERY

## 2022-10-26 PROCEDURE — 25010000002 MIDAZOLAM PER 1 MG: Performed by: ANESTHESIOLOGY

## 2022-10-26 PROCEDURE — 25010000002 EPINEPHRINE PER 0.1 MG: Performed by: SURGERY

## 2022-10-26 PROCEDURE — 25010000002 DEXAMETHASONE SODIUM PHOSPHATE 20 MG/5ML SOLUTION: Performed by: NURSE ANESTHETIST, CERTIFIED REGISTERED

## 2022-10-26 PROCEDURE — 15772 GRFG AUTOL FAT LIPO EA ADDL: CPT | Performed by: SURGERY

## 2022-10-26 PROCEDURE — 25010000002 MAGNESIUM SULFATE PER 500 MG OF MAGNESIUM: Performed by: NURSE ANESTHETIST, CERTIFIED REGISTERED

## 2022-10-26 PROCEDURE — 25010000002 HYDROMORPHONE 1 MG/ML SOLUTION: Performed by: NURSE ANESTHETIST, CERTIFIED REGISTERED

## 2022-10-26 PROCEDURE — 63710000001 PROMETHAZINE PER 25 MG: Performed by: NURSE ANESTHETIST, CERTIFIED REGISTERED

## 2022-10-26 RX ORDER — OXYCODONE AND ACETAMINOPHEN 7.5; 325 MG/1; MG/1
1 TABLET ORAL EVERY 4 HOURS PRN
Status: DISCONTINUED | OUTPATIENT
Start: 2022-10-26 | End: 2022-10-26 | Stop reason: HOSPADM

## 2022-10-26 RX ORDER — MIDAZOLAM HYDROCHLORIDE 1 MG/ML
1 INJECTION INTRAMUSCULAR; INTRAVENOUS
Status: DISCONTINUED | OUTPATIENT
Start: 2022-10-26 | End: 2022-10-26 | Stop reason: HOSPADM

## 2022-10-26 RX ORDER — ROCURONIUM BROMIDE 10 MG/ML
INJECTION, SOLUTION INTRAVENOUS AS NEEDED
Status: DISCONTINUED | OUTPATIENT
Start: 2022-10-26 | End: 2022-10-26 | Stop reason: SURG

## 2022-10-26 RX ORDER — CLINDAMYCIN PHOSPHATE 900 MG/50ML
900 INJECTION INTRAVENOUS ONCE
Status: COMPLETED | OUTPATIENT
Start: 2022-10-26 | End: 2022-10-26

## 2022-10-26 RX ORDER — MAGNESIUM SULFATE HEPTAHYDRATE 500 MG/ML
INJECTION, SOLUTION INTRAMUSCULAR; INTRAVENOUS AS NEEDED
Status: DISCONTINUED | OUTPATIENT
Start: 2022-10-26 | End: 2022-10-26 | Stop reason: SURG

## 2022-10-26 RX ORDER — NALOXONE HCL 0.4 MG/ML
0.2 VIAL (ML) INJECTION AS NEEDED
Status: DISCONTINUED | OUTPATIENT
Start: 2022-10-26 | End: 2022-10-26 | Stop reason: HOSPADM

## 2022-10-26 RX ORDER — NEOSTIGMINE METHYLSULFATE 0.5 MG/ML
INJECTION, SOLUTION INTRAVENOUS AS NEEDED
Status: DISCONTINUED | OUTPATIENT
Start: 2022-10-26 | End: 2022-10-26 | Stop reason: SURG

## 2022-10-26 RX ORDER — SODIUM CHLORIDE 0.9 % (FLUSH) 0.9 %
3-10 SYRINGE (ML) INJECTION AS NEEDED
Status: DISCONTINUED | OUTPATIENT
Start: 2022-10-26 | End: 2022-10-26 | Stop reason: HOSPADM

## 2022-10-26 RX ORDER — LIDOCAINE HYDROCHLORIDE 20 MG/ML
INJECTION, SOLUTION INFILTRATION; PERINEURAL AS NEEDED
Status: DISCONTINUED | OUTPATIENT
Start: 2022-10-26 | End: 2022-10-26 | Stop reason: SURG

## 2022-10-26 RX ORDER — LIDOCAINE HYDROCHLORIDE 10 MG/ML
0.5 INJECTION, SOLUTION EPIDURAL; INFILTRATION; INTRACAUDAL; PERINEURAL ONCE AS NEEDED
Status: DISCONTINUED | OUTPATIENT
Start: 2022-10-26 | End: 2022-10-26 | Stop reason: HOSPADM

## 2022-10-26 RX ORDER — SODIUM CHLORIDE 0.9 % (FLUSH) 0.9 %
3 SYRINGE (ML) INJECTION EVERY 12 HOURS SCHEDULED
Status: DISCONTINUED | OUTPATIENT
Start: 2022-10-26 | End: 2022-10-26 | Stop reason: HOSPADM

## 2022-10-26 RX ORDER — ONDANSETRON 2 MG/ML
INJECTION INTRAMUSCULAR; INTRAVENOUS AS NEEDED
Status: DISCONTINUED | OUTPATIENT
Start: 2022-10-26 | End: 2022-10-26 | Stop reason: SURG

## 2022-10-26 RX ORDER — PROPOFOL 10 MG/ML
VIAL (ML) INTRAVENOUS AS NEEDED
Status: DISCONTINUED | OUTPATIENT
Start: 2022-10-26 | End: 2022-10-26 | Stop reason: SURG

## 2022-10-26 RX ORDER — LABETALOL HYDROCHLORIDE 5 MG/ML
5 INJECTION, SOLUTION INTRAVENOUS
Status: DISCONTINUED | OUTPATIENT
Start: 2022-10-26 | End: 2022-10-26 | Stop reason: HOSPADM

## 2022-10-26 RX ORDER — FENTANYL CITRATE 50 UG/ML
50 INJECTION, SOLUTION INTRAMUSCULAR; INTRAVENOUS
Status: DISCONTINUED | OUTPATIENT
Start: 2022-10-26 | End: 2022-10-26 | Stop reason: HOSPADM

## 2022-10-26 RX ORDER — GLYCOPYRROLATE 0.2 MG/ML
INJECTION INTRAMUSCULAR; INTRAVENOUS AS NEEDED
Status: DISCONTINUED | OUTPATIENT
Start: 2022-10-26 | End: 2022-10-26 | Stop reason: SURG

## 2022-10-26 RX ORDER — HYDRALAZINE HYDROCHLORIDE 20 MG/ML
5 INJECTION INTRAMUSCULAR; INTRAVENOUS
Status: DISCONTINUED | OUTPATIENT
Start: 2022-10-26 | End: 2022-10-26 | Stop reason: HOSPADM

## 2022-10-26 RX ORDER — FLUMAZENIL 0.1 MG/ML
0.2 INJECTION INTRAVENOUS AS NEEDED
Status: DISCONTINUED | OUTPATIENT
Start: 2022-10-26 | End: 2022-10-26 | Stop reason: HOSPADM

## 2022-10-26 RX ORDER — SODIUM CHLORIDE, SODIUM LACTATE, POTASSIUM CHLORIDE, CALCIUM CHLORIDE 600; 310; 30; 20 MG/100ML; MG/100ML; MG/100ML; MG/100ML
9 INJECTION, SOLUTION INTRAVENOUS CONTINUOUS
Status: DISCONTINUED | OUTPATIENT
Start: 2022-10-26 | End: 2022-10-26 | Stop reason: HOSPADM

## 2022-10-26 RX ORDER — DEXAMETHASONE SODIUM PHOSPHATE 4 MG/ML
INJECTION, SOLUTION INTRA-ARTICULAR; INTRALESIONAL; INTRAMUSCULAR; INTRAVENOUS; SOFT TISSUE AS NEEDED
Status: DISCONTINUED | OUTPATIENT
Start: 2022-10-26 | End: 2022-10-26 | Stop reason: SURG

## 2022-10-26 RX ORDER — PROMETHAZINE HYDROCHLORIDE 25 MG/1
25 TABLET ORAL ONCE AS NEEDED
Status: COMPLETED | OUTPATIENT
Start: 2022-10-26 | End: 2022-10-26

## 2022-10-26 RX ORDER — FENTANYL CITRATE 50 UG/ML
INJECTION, SOLUTION INTRAMUSCULAR; INTRAVENOUS AS NEEDED
Status: DISCONTINUED | OUTPATIENT
Start: 2022-10-26 | End: 2022-10-26 | Stop reason: SURG

## 2022-10-26 RX ORDER — FAMOTIDINE 10 MG/ML
20 INJECTION, SOLUTION INTRAVENOUS ONCE
Status: COMPLETED | OUTPATIENT
Start: 2022-10-26 | End: 2022-10-26

## 2022-10-26 RX ORDER — ONDANSETRON 2 MG/ML
4 INJECTION INTRAMUSCULAR; INTRAVENOUS ONCE AS NEEDED
Status: COMPLETED | OUTPATIENT
Start: 2022-10-26 | End: 2022-10-26

## 2022-10-26 RX ORDER — EPHEDRINE SULFATE 50 MG/ML
5 INJECTION, SOLUTION INTRAVENOUS ONCE AS NEEDED
Status: DISCONTINUED | OUTPATIENT
Start: 2022-10-26 | End: 2022-10-26 | Stop reason: HOSPADM

## 2022-10-26 RX ORDER — PROMETHAZINE HYDROCHLORIDE 25 MG/1
25 SUPPOSITORY RECTAL ONCE AS NEEDED
Status: COMPLETED | OUTPATIENT
Start: 2022-10-26 | End: 2022-10-26

## 2022-10-26 RX ORDER — DIPHENHYDRAMINE HYDROCHLORIDE 50 MG/ML
12.5 INJECTION INTRAMUSCULAR; INTRAVENOUS
Status: DISCONTINUED | OUTPATIENT
Start: 2022-10-26 | End: 2022-10-26 | Stop reason: HOSPADM

## 2022-10-26 RX ORDER — SODIUM CHLORIDE, SODIUM LACTATE, POTASSIUM CHLORIDE, AND CALCIUM CHLORIDE .6; .31; .03; .02 G/100ML; G/100ML; G/100ML; G/100ML
IRRIGANT IRRIGATION AS NEEDED
Status: DISCONTINUED | OUTPATIENT
Start: 2022-10-26 | End: 2022-10-26 | Stop reason: HOSPADM

## 2022-10-26 RX ORDER — HYDROMORPHONE HYDROCHLORIDE 1 MG/ML
0.5 INJECTION, SOLUTION INTRAMUSCULAR; INTRAVENOUS; SUBCUTANEOUS
Status: DISCONTINUED | OUTPATIENT
Start: 2022-10-26 | End: 2022-10-26 | Stop reason: HOSPADM

## 2022-10-26 RX ORDER — IBUPROFEN 600 MG/1
600 TABLET ORAL ONCE AS NEEDED
Status: DISCONTINUED | OUTPATIENT
Start: 2022-10-26 | End: 2022-10-26 | Stop reason: HOSPADM

## 2022-10-26 RX ORDER — DIPHENHYDRAMINE HCL 25 MG
25 CAPSULE ORAL
Status: DISCONTINUED | OUTPATIENT
Start: 2022-10-26 | End: 2022-10-26 | Stop reason: HOSPADM

## 2022-10-26 RX ORDER — BUPIVACAINE HYDROCHLORIDE 2.5 MG/ML
INJECTION, SOLUTION INFILTRATION; PERINEURAL AS NEEDED
Status: DISCONTINUED | OUTPATIENT
Start: 2022-10-26 | End: 2022-10-26 | Stop reason: HOSPADM

## 2022-10-26 RX ADMIN — PROMETHAZINE HYDROCHLORIDE 25 MG: 25 TABLET ORAL at 15:59

## 2022-10-26 RX ADMIN — CLINDAMYCIN PHOSPHATE 900 MG: 900 INJECTION, SOLUTION INTRAVENOUS at 07:22

## 2022-10-26 RX ADMIN — HYDROMORPHONE HYDROCHLORIDE 0.5 MG: 1 INJECTION, SOLUTION INTRAMUSCULAR; INTRAVENOUS; SUBCUTANEOUS at 10:32

## 2022-10-26 RX ADMIN — LIDOCAINE HYDROCHLORIDE 100 MG: 20 INJECTION, SOLUTION INFILTRATION; PERINEURAL at 07:43

## 2022-10-26 RX ADMIN — SODIUM CHLORIDE, POTASSIUM CHLORIDE, SODIUM LACTATE AND CALCIUM CHLORIDE: 600; 310; 30; 20 INJECTION, SOLUTION INTRAVENOUS at 09:39

## 2022-10-26 RX ADMIN — MAGNESIUM SULFATE HEPTAHYDRATE 2 G: 500 INJECTION, SOLUTION INTRAMUSCULAR; INTRAVENOUS at 07:32

## 2022-10-26 RX ADMIN — FENTANYL CITRATE 100 MCG: 50 INJECTION, SOLUTION INTRAMUSCULAR; INTRAVENOUS at 07:32

## 2022-10-26 RX ADMIN — SODIUM CHLORIDE, POTASSIUM CHLORIDE, SODIUM LACTATE AND CALCIUM CHLORIDE 9 ML/HR: 600; 310; 30; 20 INJECTION, SOLUTION INTRAVENOUS at 06:55

## 2022-10-26 RX ADMIN — HYDROMORPHONE HYDROCHLORIDE 0.5 MG: 1 INJECTION, SOLUTION INTRAMUSCULAR; INTRAVENOUS; SUBCUTANEOUS at 10:24

## 2022-10-26 RX ADMIN — GLYCOPYRROLATE 0.4 MCG: 1 INJECTION INTRAMUSCULAR; INTRAVENOUS at 10:16

## 2022-10-26 RX ADMIN — PROPOFOL 50 MG: 10 INJECTION, EMULSION INTRAVENOUS at 07:49

## 2022-10-26 RX ADMIN — MIDAZOLAM 1 MG: 1 INJECTION, SOLUTION INTRAMUSCULAR; INTRAVENOUS at 06:56

## 2022-10-26 RX ADMIN — PROPOFOL 150 MG: 10 INJECTION, EMULSION INTRAVENOUS at 07:43

## 2022-10-26 RX ADMIN — NEOSTIGMINE METHYLSULFATE 2.5 MG: 0.5 INJECTION INTRAVENOUS at 10:16

## 2022-10-26 RX ADMIN — ONDANSETRON 4 MG: 2 INJECTION INTRAMUSCULAR; INTRAVENOUS at 13:02

## 2022-10-26 RX ADMIN — FAMOTIDINE 20 MG: 10 INJECTION INTRAVENOUS at 06:55

## 2022-10-26 RX ADMIN — OXYCODONE HYDROCHLORIDE AND ACETAMINOPHEN 1 TABLET: 7.5; 325 TABLET ORAL at 15:11

## 2022-10-26 RX ADMIN — PROPOFOL 50 MCG/KG/MIN: 10 INJECTION, EMULSION INTRAVENOUS at 08:00

## 2022-10-26 RX ADMIN — ONDANSETRON 4 MG: 2 INJECTION INTRAMUSCULAR; INTRAVENOUS at 10:16

## 2022-10-26 RX ADMIN — ROCURONIUM BROMIDE 50 MG: 10 INJECTION, SOLUTION INTRAVENOUS at 07:45

## 2022-10-26 RX ADMIN — DEXAMETHASONE SODIUM PHOSPHATE 8 MG: 4 INJECTION, SOLUTION INTRAMUSCULAR; INTRAVENOUS at 09:29

## 2022-10-26 NOTE — ANESTHESIA PREPROCEDURE EVALUATION
Anesthesia Evaluation     Patient summary reviewed and Nursing notes reviewed   history of anesthetic complications: prolonged sedation  NPO Solid Status: > 8 hours  NPO Liquid Status: > 6 hours           Airway   Mallampati: II  TM distance: >3 FB  Neck ROM: full  No difficulty expected  Comment: Grade 1 view with MAC 3 in past  Dental - normal exam     Pulmonary - normal exam   (+) a smoker Former,   Cardiovascular - normal exam    ECG reviewed  Rhythm: regular  Rate: normal    (+) hypertension 2 medications or greater,       Neuro/Psych  (+) psychiatric history Depression,    GI/Hepatic/Renal/Endo    (+) obesity,  GERD,  renal disease, diabetes mellitus type 2, thyroid problem thyroid cancer    ROS Comment: Hx right nephrectomy 1991 for renal CA    Musculoskeletal     Abdominal   (+) obese,    Substance History      OB/GYN          Other   arthritis,    history of cancer      Other Comment: Hx left breast CA/renal CA                  Anesthesia Plan    ASA 2     general     (Maybe consider using less inhalational anesthesia and a background Propofol drip for quicker awakening)  intravenous induction     Anesthetic plan, risks, benefits, and alternatives have been provided, discussed and informed consent has been obtained with: patient.    Plan discussed with CRNA.        CODE STATUS:

## 2022-10-26 NOTE — ANESTHESIA POSTPROCEDURE EVALUATION
Patient: Daysi Lugo    Procedure Summary     Date: 10/26/22 Room / Location: [facility] OR [address] [place] [address] MAIN OR    Anesthesia Start: 0727 Anesthesia Stop: 1046    Procedures:       BREAST RECONSTRUCTION REVISION with skin tailoring, fat graft and induration resection. (Bilateral: Breast)      FAT GRAFTING (Bilateral) Diagnosis:       Deformity and disproportion of reconstructed breast      (Deformity and disproportion of reconstructed breast [N65.0, N65.1])    Surgeons: Simon Castro MD Provider: Lucian Ashley MD    Anesthesia Type: general ASA Status: 2          Anesthesia Type: general    Vitals  Vitals Value Taken Time   /74 10/26/22 1416   Temp 36.4 °C (97.6 °F) 10/26/22 1040   Pulse 92 10/26/22 1429   Resp 16 10/26/22 1415   SpO2 91 % 10/26/22 1429   Vitals shown include unvalidated device data.        Post Anesthesia Care and Evaluation    Patient location during evaluation: PACU  Patient participation: complete - patient participated  Level of consciousness: awake and alert  Pain management: adequate    Airway patency: patent  Anesthetic complications: No anesthetic complications    Cardiovascular status: acceptable  Respiratory status: acceptable  Hydration status: acceptable    Comments: --------------------            10/26/22               1415     --------------------   BP:       120/74     Pulse:      92       Resp:       16       Temp:                SpO2:      93%      --------------------

## 2022-10-26 NOTE — ANESTHESIA PROCEDURE NOTES
Airway  Urgency: elective    Difficult airway    General Information and Staff    Patient location during procedure: OR  Anesthesiologist: Lucian Ashley MD  CRNA/CAA: Khloe Oconnor CRNA    Indications and Patient Condition  Indications for airway management: airway protection    Preoxygenated: yes  Mask difficulty assessment: 1 - vent by mask    Final Airway Details  Final airway type: endotracheal airway      Successful airway: ETT  Cuffed: yes   Successful intubation technique: direct laryngoscopy  Facilitating devices/methods: intubating stylet  Blade: CMAC  Blade size: D  ETT size (mm): 7.0  Cormack-Lehane Classification: grade IIb - view of arytenoids or posterior of glottis only  Placement verified by: chest auscultation and capnometry   Cuff volume (mL): 7  Measured from: lips  ETT/EBT  to lips (cm): 21  Number of attempts at approach: 2  Assessment: lips, teeth, and gum same as pre-op    Additional Comments  First intubation with 3 Mac blade unsuccessful with cord grade of 2B: CMAC used with cord grade of 1 with success with EBBS: ETCO2+: Atraumatic intubation

## 2022-10-27 LAB
LAB AP CASE REPORT: NORMAL
PATH REPORT.FINAL DX SPEC: NORMAL
PATH REPORT.GROSS SPEC: NORMAL

## 2022-10-28 DIAGNOSIS — I10 PRIMARY HYPERTENSION: ICD-10-CM

## 2022-10-28 RX ORDER — LISINOPRIL 10 MG/1
10 TABLET ORAL DAILY
Qty: 90 TABLET | Refills: 0 | Status: SHIPPED | OUTPATIENT
Start: 2022-10-28 | End: 2023-01-23

## 2022-10-31 ENCOUNTER — OFFICE VISIT (OUTPATIENT)
Dept: PLASTIC SURGERY | Facility: CLINIC | Age: 62
End: 2022-10-31

## 2022-10-31 VITALS
TEMPERATURE: 97.7 F | WEIGHT: 204.4 LBS | HEART RATE: 83 BPM | OXYGEN SATURATION: 94 % | HEIGHT: 67 IN | BODY MASS INDEX: 32.08 KG/M2 | SYSTOLIC BLOOD PRESSURE: 124 MMHG | DIASTOLIC BLOOD PRESSURE: 72 MMHG

## 2022-10-31 DIAGNOSIS — N65.1 DEFORMITY AND DISPROPORTION OF RECONSTRUCTED BREAST: Primary | ICD-10-CM

## 2022-10-31 DIAGNOSIS — N65.0 DEFORMITY AND DISPROPORTION OF RECONSTRUCTED BREAST: Primary | ICD-10-CM

## 2022-10-31 PROCEDURE — 99024 POSTOP FOLLOW-UP VISIT: CPT | Performed by: SURGERY

## 2022-10-31 RX ORDER — OXYCODONE HYDROCHLORIDE AND ACETAMINOPHEN 5; 325 MG/1; MG/1
1 TABLET ORAL EVERY 6 HOURS PRN
Qty: 10 TABLET | Refills: 0 | Status: SHIPPED | OUTPATIENT
Start: 2022-10-31 | End: 2022-11-14

## 2022-10-31 RX ORDER — ONDANSETRON 4 MG/1
4 TABLET, FILM COATED ORAL EVERY 8 HOURS PRN
Qty: 18 TABLET | Refills: 0 | Status: SHIPPED | OUTPATIENT
Start: 2022-10-31 | End: 2022-11-28

## 2022-11-04 ENCOUNTER — APPOINTMENT (OUTPATIENT)
Dept: PHYSICAL THERAPY | Facility: HOSPITAL | Age: 62
End: 2022-11-04

## 2022-11-07 DIAGNOSIS — E11.9 TYPE 2 DIABETES MELLITUS WITHOUT COMPLICATION, WITHOUT LONG-TERM CURRENT USE OF INSULIN: ICD-10-CM

## 2022-11-07 RX ORDER — SEMAGLUTIDE 1.34 MG/ML
0.25 INJECTION, SOLUTION SUBCUTANEOUS WEEKLY
Qty: 1.5 ML | Refills: 0 | Status: SHIPPED | OUTPATIENT
Start: 2022-11-07 | End: 2022-12-27

## 2022-11-14 ENCOUNTER — OFFICE VISIT (OUTPATIENT)
Dept: PLASTIC SURGERY | Facility: CLINIC | Age: 62
End: 2022-11-14

## 2022-11-14 VITALS
OXYGEN SATURATION: 95 % | TEMPERATURE: 97.8 F | WEIGHT: 204 LBS | SYSTOLIC BLOOD PRESSURE: 135 MMHG | HEART RATE: 79 BPM | BODY MASS INDEX: 32.02 KG/M2 | DIASTOLIC BLOOD PRESSURE: 84 MMHG | HEIGHT: 67 IN

## 2022-11-14 DIAGNOSIS — Z09 POSTOPERATIVE FOLLOW-UP: Primary | ICD-10-CM

## 2022-11-14 PROCEDURE — 99024 POSTOP FOLLOW-UP VISIT: CPT | Performed by: NURSE PRACTITIONER

## 2022-11-14 NOTE — PROGRESS NOTES
"Follow-up (Left breast red and drainage)       History of Present Illness  Daysi Lugo is a 62 y.o. female who presents to [facility] for Pre-Operative Examination for BREAST RECONSTRUCTION REVISION with skin tailoring, fat graft and induration resection on 10/26/2022.     Noticed left breast being red and some drainage.  Drainage appears clear to yellow in color.  No odor noted.    Subjective        Hydrocodone and Penicillins  Allergies Reconciled.    Review of Systems   All review of system has been reviewed and it  is negative except the ones note above.     Objective     /84 (BP Location: Right arm, Patient Position: Sitting)   Pulse 79   Temp 97.8 °F (36.6 °C)   Ht 170.2 cm (67.01\")   Wt 92.5 kg (204 lb)   SpO2 95%   BMI 31.94 kg/m²     Body mass index is 31.94 kg/m².    Physical Exam   Cardiovascular: Normal rate.     Pulmonary/Chest  Effort normal.      Breast  Expected swelling. Incisions c/d/i, left lateral incision with spitting suture and mild erythema, right lateral suture spitting with mild erythema and tenderness, left nipple suture spitting with mild erythema; no pus or warmth               Assessment and Plan      Diagnoses and all orders for this visit:    1. Postoperative follow-up (Primary)        Plan:   Trimmed sutures off bilateral breast incisions. Applied antibiotic ointment to areas. Patient is going to monitor for more redness, warmth or fever. She is aware to call if symptoms worsen and want antibiotics called in. RTC at scheduled appointment.    Scribed by Jeanie Rojo MA, acting as a scribe for LYNNE Parikh, 11/14/22 17:26 EST.  LYNNE Parikh's signature on the note affirms that the note adequately documents the care provided.        Patient was given instructions and counseling regarding her condition. Please see specific information pulled into the AVS if appropriate.     Brandy Ojeda, " APRN  11/14/2022

## 2022-11-23 NOTE — PROGRESS NOTES
"Chief Complaint  Post-op Follow-up (1m post op follow up )    Subjective          History of Present Illness  Daysi Lugo is a 62 y.o. female who presents to [facility] for Postoperative Follow-Up of BREAST RECONSTRUCTION REVISION with skin tailoring, fat graft and induration resection 10/26/22    She is here for 1m post op follow up, she states she has a few areas bilaterally on her incisions that are red and draining. She states she had some sutures working out that Brandy George had to trim a few weeks ago.       Allergies: Hydrocodone and Penicillins  Allergies Reconciled.    Review of Systems   All review of system has been reviewed and it  is negative except the ones note above.     Objective     /82 (BP Location: Right arm)   Pulse 88   Temp 98 °F (36.7 °C) (Temporal)   Ht 170.2 cm (67\")   Wt 90.2 kg (198 lb 12.8 oz)   SpO2 96%   BMI 31.14 kg/m²     Body mass index is 31.14 kg/m².    Physical Exam   Breast: incisions c/d/i    Result Review :                Assessment and Plan      Diagnoses and all orders for this visit:    1. 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 She will wait to heal and think about it. She will let me know next visit.         Follow Up     No follow-ups on file.    Patient was given instructions and counseling regarding her condition. Please see specific information pulled into the AVS if appropriate.     Simon Castro MD  11/28/2022  "

## 2022-11-27 DIAGNOSIS — E11.9 TYPE 2 DIABETES MELLITUS WITHOUT COMPLICATION, WITHOUT LONG-TERM CURRENT USE OF INSULIN: ICD-10-CM

## 2022-11-28 ENCOUNTER — TELEPHONE (OUTPATIENT)
Dept: ONCOLOGY | Facility: CLINIC | Age: 62
End: 2022-11-28

## 2022-11-28 ENCOUNTER — OFFICE VISIT (OUTPATIENT)
Dept: PLASTIC SURGERY | Facility: CLINIC | Age: 62
End: 2022-11-28

## 2022-11-28 VITALS
HEART RATE: 88 BPM | BODY MASS INDEX: 31.2 KG/M2 | OXYGEN SATURATION: 96 % | DIASTOLIC BLOOD PRESSURE: 82 MMHG | SYSTOLIC BLOOD PRESSURE: 138 MMHG | HEIGHT: 67 IN | TEMPERATURE: 98 F | WEIGHT: 198.8 LBS

## 2022-11-28 DIAGNOSIS — N65.0 DEFORMITY AND DISPROPORTION OF RECONSTRUCTED BREAST: Primary | ICD-10-CM

## 2022-11-28 DIAGNOSIS — N65.1 DEFORMITY AND DISPROPORTION OF RECONSTRUCTED BREAST: Primary | ICD-10-CM

## 2022-11-28 PROCEDURE — 99024 POSTOP FOLLOW-UP VISIT: CPT | Performed by: SURGERY

## 2022-11-28 RX ORDER — METFORMIN HYDROCHLORIDE 500 MG/1
TABLET, EXTENDED RELEASE ORAL
Qty: 360 TABLET | Refills: 0 | Status: SHIPPED | OUTPATIENT
Start: 2022-11-28 | End: 2023-02-16

## 2022-11-28 NOTE — TELEPHONE ENCOUNTER
Returned call to Daysi.  She reports she went to see Dr Castro today and reported to her that she has been having nausea and vomiting for about six weeks.  She states Dr Banda suggested she call Dr King's office.  I inquired if Daysi has started any new medications or any changes in her medications in the last six weeks.  She reports she was started on ozempic about 2 months ago.  UpToDate search on ozempic lists nausea and vomiting as a common side effect.  She has been on letrozole for quite some time. Explained this is likely related to the ozempic.  I suggested she call Dr Tineo who has prescribed this for her to help her manage this side effect.  She voiced understanding.

## 2022-12-07 ENCOUNTER — OFFICE VISIT (OUTPATIENT)
Dept: FAMILY MEDICINE CLINIC | Facility: CLINIC | Age: 62
End: 2022-12-07

## 2022-12-21 DIAGNOSIS — I10 PRIMARY HYPERTENSION: ICD-10-CM

## 2022-12-27 DIAGNOSIS — E11.9 TYPE 2 DIABETES MELLITUS WITHOUT COMPLICATION, WITHOUT LONG-TERM CURRENT USE OF INSULIN: ICD-10-CM

## 2022-12-27 RX ORDER — SEMAGLUTIDE 1.34 MG/ML
INJECTION, SOLUTION SUBCUTANEOUS
Qty: 1.5 ML | Refills: 0 | Status: SHIPPED | OUTPATIENT
Start: 2022-12-27 | End: 2023-01-06 | Stop reason: SDUPTHER

## 2022-12-27 RX ORDER — AMLODIPINE BESYLATE 10 MG/1
10 TABLET ORAL DAILY
Qty: 90 TABLET | Refills: 1 | Status: SHIPPED | OUTPATIENT
Start: 2022-12-27

## 2023-01-06 DIAGNOSIS — E11.9 TYPE 2 DIABETES MELLITUS WITHOUT COMPLICATION, WITHOUT LONG-TERM CURRENT USE OF INSULIN: ICD-10-CM

## 2023-01-06 RX ORDER — SEMAGLUTIDE 1.34 MG/ML
0.25 INJECTION, SOLUTION SUBCUTANEOUS WEEKLY
Qty: 1.5 ML | Refills: 3 | Status: SHIPPED | OUTPATIENT
Start: 2023-01-06 | End: 2023-03-27

## 2023-01-23 ENCOUNTER — TELEPHONE (OUTPATIENT)
Dept: SURGERY | Facility: CLINIC | Age: 63
End: 2023-01-23
Payer: COMMERCIAL

## 2023-01-23 ENCOUNTER — OFFICE VISIT (OUTPATIENT)
Dept: SURGERY | Facility: CLINIC | Age: 63
End: 2023-01-23
Payer: COMMERCIAL

## 2023-01-23 VITALS
BODY MASS INDEX: 31.08 KG/M2 | HEIGHT: 67 IN | WEIGHT: 198 LBS | SYSTOLIC BLOOD PRESSURE: 138 MMHG | DIASTOLIC BLOOD PRESSURE: 84 MMHG

## 2023-01-23 DIAGNOSIS — Z80.9 FAMILY HISTORY OF CANCER: ICD-10-CM

## 2023-01-23 DIAGNOSIS — I10 PRIMARY HYPERTENSION: ICD-10-CM

## 2023-01-23 DIAGNOSIS — Z17.0 MALIGNANT NEOPLASM OF UPPER-INNER QUADRANT OF LEFT BREAST IN FEMALE, ESTROGEN RECEPTOR POSITIVE: Primary | ICD-10-CM

## 2023-01-23 DIAGNOSIS — C50.212 MALIGNANT NEOPLASM OF UPPER-INNER QUADRANT OF LEFT BREAST IN FEMALE, ESTROGEN RECEPTOR POSITIVE: Primary | ICD-10-CM

## 2023-01-23 PROCEDURE — 99214 OFFICE O/P EST MOD 30 MIN: CPT | Performed by: NURSE PRACTITIONER

## 2023-01-23 RX ORDER — LISINOPRIL 10 MG/1
10 TABLET ORAL DAILY
Qty: 90 TABLET | Refills: 0 | Status: SHIPPED | OUTPATIENT
Start: 2023-01-23 | End: 2023-03-29 | Stop reason: SINTOL

## 2023-01-23 NOTE — PROGRESS NOTES
[facility]     Referring Provider: No ref. provider found     Chief complaint: Routine follow up breast cancer     HPI:   12/29/21:  Seen by Dr Delong  Ms. Daysi Lugo is a 62 yo woman, seen at the request of Dr. Zarina Tineo, for a new diagnosis of left breast cancer. The patient first noticed a lump in her upper inner left breast in October. It has not changed in size since she first noticed it and she denies any associated pain. Her work-up is detailed in the oncologic history below. Prior to this year, it had been a least 25 years since she had a mammogram. She has a past history of a bilateral breast reduction in 2008.     She was recently diagnosed with diabetes and her PCP is trying to get her blood sugar under control. Her most recent hemoglobin A1c on 12/16/21 was 8.5. She also has a personal history of kidney cancer, sp right nephrectomy. Her maternal grandmother had breast cancer and colon cancer. She denies any family history of ovarian cancer. She saw Dr. King earlier today prior to this appointment and Dr. King sent genetic testing.      2/2/22:  Seen by Dr Delong  She underwent left partial mastectomy and SLNB with oncoplastic closure and right reduction for symmetry on 1/17/22. See surgery & pathology details below in oncologic history. She has been recovering well and has no complaints.     5/4/22:  She returns today for follow up with expected left breast discomfort s/p radiation treatments were completed in 4/2022.  She also right breast discomfort with central thickening, revision by Dr Castro planned in 10/22.  She will start femara today as instructed by Dr Ann.   Her  passed away in 2/22 from colon cancer, she continues to grieve.  He was sick with terminal cancer for 16 months and she was his caregiver.  She has cut back on her work demands, continues to work on her pHD.   On 4/25/22 she was seen by Dr Castro:  Center of the right breast has a hard area. At  time can be painful I explained to patient we will proceed with breast shape improvement with bilateral lateral breast skin tailoring, upper breast fat graft and possible breast induration resection   Her last clinic visit with Dr King was 2/25/22:  Oncotype DX testing shows score of 14, her benefit from chemo is less than 1%.  We will start aromatase enema to Femara. Explained in length side effects of Femara.    Seen in PT/LE clinic in 2/22 and again today:  Currently, negative s/s of lymphedema, infection, seroma, or axillary cording. We did complete a baseline post operative bioimpedance assessment, with current L-Dex score of -5.8 , which is WNL    10/20/22:  Seen by Dr Delong  She returns today for scheduled follow-up. She underwent mammogram prior to her appointment which was benign (see report details below). She remains on Femara and is still tolerating this well. She is scheduled to undergo revision with Dr. Castro next week. She has lumpy areas on both sides that she wants removed. She also complains of occasional pain on both sides.    1/23/23, Interval History:  She returns today for follow up with no breast complaints, she is happy with her revision done in 11/22.  Occasional sharp random pains in bilateral breasts c/w surgery.  Bilateral breast revision was completed 11/22 by Dr Castro    Her last clinic visit with Dr King was in 9/22:  She started Femara February 2022 and is currently tolerating without any hot flashes or joint pains.  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 In the posterior one-third upper inner quadrant of the left breast at the site of palpable concern corresponding to the overlying triangular skin marker, there is an irregular mass that measures on the order of 2.6 x 2.5 x 2.3 cm. Internal coarse calcifications and area of fat necrosis are noted. I see no other dominant masses in either breast. No areas of architectural distortion are appreciated. There is no evidence for skin thickening, nipple retraction or axillary adenopathy.  US:  Targeted sonographic evaluation of the left breast was performed through the area of palpable concern which corresponds to the 10 o'clock position on the order of 5 cm from the nipple. At this location, there is an irregular hypoechoic mass with internal calcifications. The mass measures 2.4 x 1.9 x 1.4 cm. No detectable internal vascularity is appreciated. Posterior acoustic shadowing is noted.  BI-RADS 4: Suspicious.     12/2/2021 Biopsy    Left Breast, US-Guided Biopsy ( [facility]):    1. Left Breast, 10 O'clock, 5 cm from Nipple, Ultrasound-Guided Biopsies for a Mass: INVASIVE MAMMARY                CARCINOMA, NO SPECIAL TYPE (INVASIVE DUCTAL CARCINOMA).               A. Histologic grade: [place] histologic score II (tubules = 3, nuclei = 2, mitosis = 1).               B. Largest contiguous invasive focus measures 10 mm in a core.               C. No definitive in situ component identified.                       D. No lymphovascular nor perineural invasion identified.    ER+ (%, strong)  WI+ (%, strong)  HER2 negative (IHC 1+)  Ki-67 8%     12/23/2021 Imaging    Bilateral Breast MRI ( [facility]):  Within the posterior one-third upper inner quadrant of the left breast centered at the 10 o'clock position on the order of 8 cm from nipple there is an irregular enhancing mass that measures on the order of 2.5 cm in anterior to posterior dimension, 2.8 cm in the mediolateral  dimension and 2.1 cm in the superior-inferior dimension. A signal void is seen within the mass that corresponds to a bowtie-shaped metallic clip. This represents the biopsy-proven site of malignancy.   No other areas of abnormal enhancement or morphology are seen within the left breast. I see no evidence for abnormal left breast skin, nipple or chest wall enhancement.  There is no evidence for left axillary or internal mammary chain adenopathy.    There are no areas of abnormal enhancement or morphology in the right breast. I see no evidence for abnormal right breast skin, nipple or chest wall enhancement and there is no evidence for right axillary or internal mammary chain adenopathy.  BI-RADS 6: Known malignancy.     12/29/2021 Genetic Testing    [employer] Common Hereditary Cancers Panel (47 genes):    Negative     1/17/2022 Surgery    Left ultrasound-guided partial mastectomy and sentinel lymph node biopsy with oncoplastic closure and right reduction for symmetry    1.  Breast, Left, Lumpectomy (88 grams):                A.  Invasive mammary carcinoma, no special type (ductal), [place] histologic grade II        (tubules = 3, nuclei = 2, mitoses = 1), measuring 30 mm maximally with associated         perineural invasion and microcalcifications identified adjacent to bow tie clip and biopsy site change.   B.  Focal  minimal associated atypical ductal hyperplasia; no definitive in situ carcinoma is identified.   C.  All margins are free of tumor (closest margin inferior at 2.8 mm).  D.  Incidental fibroadenoma 2.6 mm maximally.    E.  Focal fat necrosis.  F.  See synoptic template for complete details and final margin measurements.      2.  Breast, Left, Additional, Medial, Inferior and Lateral Margins, Inked and Oriented Excision:   A.  Benign breast parenchyma with focal organizing fat necrosis; the final medial inferior and lateral margins are free of tumor by an additional 10 mm.      3.  Breast, Left  Additional Posterior Margin, Inked and Oriented Excision:                A.  Benign fibroadipose tissue and skeletal muscle; the final posterior margin is free of tumor and         by an additional 3 mm.      4.  Lymph Node, Left Axillary, Pap[place] #1, Excision:                 A.  Benign lymph node (0/1).     5.  Breast, Right, Reduction Mammoplasty (369 grams):                 A.  Benign skin, subcutaneous tissue and breast parenchyma.      6.  Breast, Left, Additional Anterior Margin, Excision:                A.  Benign skin and subcutaneous tissue (tumor measures an estimated 20 mm from the overlying skin surface).     7.  Breast, Left, Reduction Mammoplasty (225 grams):                A.  Benign skin, subcutaneous and breast parenchyma.       Oncotype DX: 14     3/9/2022 - 4/5/2022 Radiation    Radiation OncologyTreatment Course:  Daysi Lugo received 4990 cGy in 20 fractions to LEFT breast.     4/6/2022 -  Hormonal Therapy    Femara     10/14/2022 Imaging    Screening MMG with [facility] ([facility]):  The breasts are heterogeneously dense, which may obscure small masses.   New benign-appearing postsurgical changes are identified in both breasts, including areas of benign-appearing fat necrosis. There are benign-appearing calcifications.  There are no suspicious masses, calcifications, or areas of architectural distortion.  BI-RADS 2: Benign.         Review of Systems:  See interval history.       Medications:    Current Outpatient Medications:   •  amLODIPine (NORVASC) 10 MG tablet, TAKE 1 TABLET BY MOUTH DAILY, Disp: 90 tablet, Rfl: 1  •  Blood Glucose Monitoring Suppl (OneTouch Verio Reflect) w/Device kit, See Admin Instructions., Disp: , Rfl:   •  glucose blood test strip, 1 each by Other route Daily., Disp: 25 each, Rfl: 2  •  Lancets (freestyle) lancets, 1 each by Other route Daily., Disp: 100 each, Rfl: 4  •  letrozole (FEMARA) 2.5 MG tablet, Take 1 tablet by mouth Daily., Disp: 90 tablet, Rfl: 2  •   "lisinopril (PRINIVIL,ZESTRIL) 10 MG tablet, TAKE 1 TABLET BY MOUTH DAILY, Disp: 90 tablet, Rfl: 0  •  metFORMIN ER (GLUCOPHAGE-XR) 500 MG 24 hr tablet, TAKE 2 TABLETS BY MOUTH TWICE DAILY WITH MEALS, Disp: 360 tablet, Rfl: 0  •  Semaglutide,0.25 or 0.5MG/DOS, (Ozempic, 0.25 or 0.5 MG/DOSE,) 2 MG/1.5ML solution pen-injector, Inject 0.25 mg under the skin into the appropriate area as directed 1 (One) Time Per Week., Disp: 1.5 mL, Rfl: 3      Allergies   Allergen Reactions   • Hydrocodone Anaphylaxis     PER PATIENT   • Penicillins Itching       Family History   Problem Relation Age of Onset   • Diabetes type II Mother    • Hypertension Mother    • Thyroid cancer Mother    • Breast cancer Maternal Grandmother    • Colon cancer Maternal Grandmother    • Diabetes Maternal Grandmother    • Stroke Maternal Grandmother    • Alzheimer's disease Maternal Grandfather    • Malig Hyperthermia Neg Hx        PHYSICAL EXAMINATION:   Vitals:    01/23/23 1225   BP: 138/84      /84 (BP Location: Right arm)   Ht 170.2 cm (67\")   Wt 89.8 kg (198 lb)   LMP  (LMP Unknown)   BMI 31.01 kg/m²     I reviewed physical exam, no changes noted    ECOG 0 - Asymptomatic  General: NAD, well appearing  Psych: a&o x3, normal mood and affect  Eyes: EOMI, no scleral icterus  ENMT: neck supple without masses or thyromegaly, mucous membranes moist  MSK: normal gait, normal ROM in bilateral shoulders  Lymph nodes: Left axillary scar; no cervical, supraclavicular or axillary lymphadenopathy  Breast: Good symmetry  Right: On inspection there are well-healed mastopexy scars. There is a firm area which surrounds the NAC medially and feels like fat necrosis. No nipple abnormalities.  Left: On inspection there are well-healed mastopexy scars.  No nipple abnormalities.      Assessment:   1)   63 y.o. F with a diagnosis of left breast cancer 12/2021: Intermediate grade, invasive ductal carcinoma, ER/AZ+, Her2 negative. She underwent left partial " mastectomy and SLNB with oncoplastic closure and right reduction for symmetry on 1/17/22, pT2N0, anatomic stage IIA, prognostic stage IA. She completed radiation on 4/5/22. She is currently on Femara.    Discussion:   Dr Delong explained that fat necrosis is caused by surgery and scar tissue.  I also reassured her that occasional pain is normal after surgery and this should eventually become less frequent.    I advised her to massage daily.along the surgical incisions to lessen scarring, massage techniques demonstrated.    Plan:  -Continue follow-up with Dr. King.  -Continue follow-up with Dr. Castro.  -screening mammogram with tomosynthesis in 10/2023 at [facility] followed by exam  - massage to surgical incisions  -She was instructed to call sooner with any questions, concerns or changes on BSE.    Maru Arambula, LYNNE      CC:  No ref. provider found

## 2023-01-23 NOTE — PROGRESS NOTES
"Chief Complaint  Post-op Follow-up (3m post op follow up )    Subjective          History of Present Illness  Daysi Lugo is a 63 y.o. female who presents to [facility] for Postoperative Follow-Up of BREAST RECONSTRUCTION REVISION with skin tailoring, fat graft and induration resection 10/26/22    She is here for 3m post op follow up, no concerns today     Allergies: Hydrocodone and Penicillins  Allergies Reconciled.    Review of Systems   All review of system has been reviewed and it  is negative except the ones note above.     Objective     /80 (BP Location: Right arm)   Temp 97.6 °F (36.4 °C) (Temporal)   Ht 170.2 cm (67\")   Wt 90.3 kg (199 lb)   BMI 31.17 kg/m²     Body mass index is 31.17 kg/m².    Physical Exam   Breast:     Upper breast still deflated, area of fat necrosis at the medial area to the right nipple.   Result Review :                Assessment and Plan      Diagnoses and all orders for this visit:    1. Deformity and disproportion of reconstructed breast (Primary)        Plan: Patient would like to have a better projection of the breasts. I recommend placement of implant with a large base + fat graft. I will place mesh due to the previous history of radiation in order to decrease capsular contracture.       67118-65 revision of reconstructed breast  37230-00- use of biological mesh  69773-75 - insertion of breast implant on a separate day from mastectomy       OR supplies:  48573-181, 450, 475 LP x2  Sizer: [id number] x1   Mesh: alloderm rectangle 16x20 perforated [id number] x2      Follow Up     No follow-ups on file.    Patient was given instructions and counseling regarding her condition. Please see specific information pulled into the AVS if appropriate.     Simon Castro MD  01/30/2023  "

## 2023-01-30 ENCOUNTER — OFFICE VISIT (OUTPATIENT)
Dept: PLASTIC SURGERY | Facility: CLINIC | Age: 63
End: 2023-01-30
Payer: COMMERCIAL

## 2023-01-30 VITALS
HEIGHT: 67 IN | WEIGHT: 199 LBS | BODY MASS INDEX: 31.23 KG/M2 | DIASTOLIC BLOOD PRESSURE: 80 MMHG | SYSTOLIC BLOOD PRESSURE: 116 MMHG | TEMPERATURE: 97.6 F

## 2023-01-30 DIAGNOSIS — N65.0 DEFORMITY AND DISPROPORTION OF RECONSTRUCTED BREAST: Primary | ICD-10-CM

## 2023-01-30 DIAGNOSIS — N65.1 DEFORMITY AND DISPROPORTION OF RECONSTRUCTED BREAST: Primary | ICD-10-CM

## 2023-01-30 PROCEDURE — 99212 OFFICE O/P EST SF 10 MIN: CPT | Performed by: SURGERY

## 2023-02-06 ENCOUNTER — PREP FOR SURGERY (OUTPATIENT)
Dept: OTHER | Facility: HOSPITAL | Age: 63
End: 2023-02-06
Payer: COMMERCIAL

## 2023-02-06 DIAGNOSIS — N65.0 DEFORMITY AND DISPROPORTION OF RECONSTRUCTED BREAST: Primary | ICD-10-CM

## 2023-02-06 DIAGNOSIS — N65.1 DEFORMITY AND DISPROPORTION OF RECONSTRUCTED BREAST: Primary | ICD-10-CM

## 2023-02-06 RX ORDER — CEFAZOLIN SODIUM 2 G/100ML
2 INJECTION, SOLUTION INTRAVENOUS ONCE
Status: CANCELLED | OUTPATIENT
Start: 2023-02-06 | End: 2023-02-06

## 2023-02-15 RX ORDER — SULFAMETHOXAZOLE AND TRIMETHOPRIM 800; 160 MG/1; MG/1
1 TABLET ORAL 2 TIMES DAILY
Qty: 20 TABLET | Refills: 0 | Status: SHIPPED | OUTPATIENT
Start: 2023-02-15 | End: 2023-03-27

## 2023-02-16 ENCOUNTER — OFFICE VISIT (OUTPATIENT)
Dept: PLASTIC SURGERY | Facility: CLINIC | Age: 63
End: 2023-02-16
Payer: COMMERCIAL

## 2023-02-16 VITALS
OXYGEN SATURATION: 95 % | DIASTOLIC BLOOD PRESSURE: 92 MMHG | SYSTOLIC BLOOD PRESSURE: 152 MMHG | TEMPERATURE: 95.9 F | HEART RATE: 89 BPM | BODY MASS INDEX: 31.77 KG/M2 | HEIGHT: 67 IN | WEIGHT: 202.4 LBS

## 2023-02-16 DIAGNOSIS — E11.9 TYPE 2 DIABETES MELLITUS WITHOUT COMPLICATION, WITHOUT LONG-TERM CURRENT USE OF INSULIN: ICD-10-CM

## 2023-02-16 DIAGNOSIS — N61.1 BREAST ABSCESS: Primary | ICD-10-CM

## 2023-02-16 PROCEDURE — 10060 I&D ABSCESS SIMPLE/SINGLE: CPT | Performed by: NURSE PRACTITIONER

## 2023-02-16 PROCEDURE — 87070 CULTURE OTHR SPECIMN AEROBIC: CPT | Performed by: NURSE PRACTITIONER

## 2023-02-16 PROCEDURE — 87205 SMEAR GRAM STAIN: CPT | Performed by: NURSE PRACTITIONER

## 2023-02-16 RX ORDER — OXYCODONE HYDROCHLORIDE AND ACETAMINOPHEN 5; 325 MG/1; MG/1
1 TABLET ORAL EVERY 6 HOURS PRN
Qty: 12 TABLET | Refills: 0 | Status: SHIPPED | OUTPATIENT
Start: 2023-02-16 | End: 2023-02-20

## 2023-02-16 RX ORDER — METFORMIN HYDROCHLORIDE 500 MG/1
TABLET, EXTENDED RELEASE ORAL
Qty: 360 TABLET | Refills: 0 | Status: SHIPPED | OUTPATIENT
Start: 2023-02-16

## 2023-02-16 NOTE — PROGRESS NOTES
"Chief Complaint  Wound Check (Right breast)    Subjective          History of Present Illness  Daysi Lugo is a 63 y.o. female who presents to [facility] for Postoperative Follow-Up of BREAST RECONSTRUCTION REVISION with skin tailoring, fat graft and induration resection 10/26/22    She is here today for wound check on right breast around nipple.  Patient noted some blisters on her right breast incision on Saturday with swelling and increased tenderness.  It became red and on Monday popped open releasing blood and pus.  Patient started an Bactrim DS yesterday. Breast feels slightly better but still draining a lot of pus.     Allergies: Hydrocodone and Penicillins  Allergies Reconciled.    Review of Systems   All review of system has been reviewed and it  is negative except the ones note above.     Objective     /92 (BP Location: Right arm, Patient Position: Sitting)   Pulse 89   Temp 95.9 °F (35.5 °C) (Temporal)   Ht 170.2 cm (67.01\")   Wt 91.8 kg (202 lb 6.4 oz)   SpO2 95%   BMI 31.69 kg/m²     Body mass index is 31.69 kg/m².    Physical Exam   Cardiovascular: Normal rate.     Pulmonary/Chest  Effort normal.        Breast  Right breast abscess at inferior areolar with 3 mm opening draining purulent drng, mild erythema around area, mild tenderness over area    Result Review :                Assessment and Plan      Diagnoses and all orders for this visit:    1. Breast abscess (Primary)  -     Wound Culture - Wound, Breast, Right  -     Discontinue: oxyCODONE-acetaminophen (Percocet) 5-325 MG per tablet; Take 1 tablet by mouth Every 6 (Six) Hours As Needed for Moderate Pain for up to 12 doses.  Dispense: 12 tablet; Refill: 0        Plan: Discussed risk and benefits of I and D including bleeding, infection, alopecia and scarring. Patient wishes to proceed. Consent obtained. Local injected to site, Lidocaine 1% with epinephrine.  Site prepped with " Betadine  in sterile fashion. Site draped in sterile fashion. 1 inch Linear incision made over abscess with No. 15 blade, cloudy fluid and small amount of pus evacuated, hemostats use to break up possible loculations. Established hemostasis with direct pressure. Site was thoroughly irrigated with Betadine/NS 50/50. Site packed with gauze packing. Site cleaned with sterile normal saline. Bacitracin applied. The patient tolerated the procedure well with no immediate complications.    Instructed to remove packing in 3 days and evaluate, if improved let heal secondarily but if still draining or red then pack for another 3 days. Finish prescribed antibiotic, culture obtained will call with result. Monitor for any worsening of symptoms including fever, pus, warmth, or increase in drng and call office. RTC Monday with Dr. Castro.                   Follow Up     No follow-ups on file.    Patient was given instructions and counseling regarding her condition. Please see specific information pulled into the AVS if appropriate.     Brandy Ojeda, LYNNE  02/16/2023

## 2023-02-17 NOTE — PROGRESS NOTES
"Chief Complaint  Breast Abcess  (Right Breast Abcess )    Subjective          Wound Check      Daysi Lugo is a 63 y.o. female who presents to [facility] RECONSTRU[facility] SURGERY for Postoperative Follow-Up of BREAST RECONSTRUCTION REVISION with skin tailoring, fat graft and induration resection 10/26/22    She is here for follow up on right breast abcess  Allergies: Hydrocodone and Penicillins  Allergies Reconciled.    Review of Systems   All review of system has been reviewed and it  is negative except the ones note above.     Objective     /100 (BP Location: Right arm)   Pulse 91   Temp 97.8 °F (36.6 °C) (Temporal)   Ht 170.2 cm (67\")   Wt 86.1 kg (189 lb 12.8 oz)   SpO2 95%   BMI 29.73 kg/m²     Body mass index is 29.73 kg/m².    Physical Exam   Cardiovascular: Normal rate.     Pulmonary/Chest  Effort normal.        Breast: no sign of erythema. Packing removed. Minimal pus. Small amount of blood drainage.       Result Review :                Assessment and Plan      Diagnoses and all orders for this visit:    1. Breast infection (Primary)        Plan:   Keep site clean. Follow up in 2 weeks for re evaluation.       Follow Up     No follow-ups on file.    Patient was given instructions and counseling regarding her condition. Please see specific information pulled into the AVS if appropriate.     Simon Castro MD  02/20/2023  "

## 2023-02-19 LAB
BACTERIA SPEC AEROBE CULT: NORMAL
GRAM STN SPEC: NORMAL

## 2023-02-20 ENCOUNTER — OFFICE VISIT (OUTPATIENT)
Dept: PLASTIC SURGERY | Facility: CLINIC | Age: 63
End: 2023-02-20
Payer: COMMERCIAL

## 2023-02-20 VITALS
SYSTOLIC BLOOD PRESSURE: 168 MMHG | WEIGHT: 189.8 LBS | DIASTOLIC BLOOD PRESSURE: 100 MMHG | BODY MASS INDEX: 29.79 KG/M2 | OXYGEN SATURATION: 95 % | HEIGHT: 67 IN | TEMPERATURE: 97.8 F | HEART RATE: 91 BPM

## 2023-02-20 DIAGNOSIS — N61.0 BREAST INFECTION: Primary | ICD-10-CM

## 2023-02-20 PROCEDURE — 99024 POSTOP FOLLOW-UP VISIT: CPT | Performed by: SURGERY

## 2023-02-27 ENCOUNTER — TELEPHONE (OUTPATIENT)
Dept: ONCOLOGY | Facility: CLINIC | Age: 63
End: 2023-02-27
Payer: COMMERCIAL

## 2023-02-27 NOTE — TELEPHONE ENCOUNTER
Spoke with pt advised her that DR AMADOR schedule for 3/6/23 is completely booked. She will keep her already scheduled appt.

## 2023-02-27 NOTE — TELEPHONE ENCOUNTER
Caller: Daysi Lugo    Relationship to patient: Self    Best call back number: [phone]    Chief complaint: PT WANTED TO RESCHEDULE, HUB UNABLE TO RESCHEDULE FOR FOLLOW UP 1     Type of visit: LAB AND FOLLOW UP    Requested date: 3-6-23     If rescheduling, when is the original appointment: 3-7-23     Additional notes:PLEASE CALL PATIENT TO RESCHEDULE, SHE WANTED TO COME IN ON 3-6-23 SO SHE ONLY HAS TO MAKE 1 TRIP TO [place]

## 2023-03-06 ENCOUNTER — OFFICE VISIT (OUTPATIENT)
Dept: PLASTIC SURGERY | Facility: CLINIC | Age: 63
End: 2023-03-06
Payer: COMMERCIAL

## 2023-03-06 VITALS
SYSTOLIC BLOOD PRESSURE: 156 MMHG | TEMPERATURE: 97.6 F | HEART RATE: 90 BPM | DIASTOLIC BLOOD PRESSURE: 86 MMHG | WEIGHT: 202.5 LBS | OXYGEN SATURATION: 95 % | BODY MASS INDEX: 31.78 KG/M2 | HEIGHT: 67 IN

## 2023-03-06 DIAGNOSIS — N61.0 BREAST INFECTION: Primary | ICD-10-CM

## 2023-03-06 PROCEDURE — 99212 OFFICE O/P EST SF 10 MIN: CPT | Performed by: SURGERY

## 2023-03-07 ENCOUNTER — LAB (OUTPATIENT)
Dept: LAB | Facility: HOSPITAL | Age: 63
End: 2023-03-07
Payer: COMMERCIAL

## 2023-03-07 ENCOUNTER — OFFICE VISIT (OUTPATIENT)
Dept: ONCOLOGY | Facility: CLINIC | Age: 63
End: 2023-03-07
Payer: COMMERCIAL

## 2023-03-07 VITALS
WEIGHT: 203.6 LBS | HEIGHT: 67 IN | RESPIRATION RATE: 16 BRPM | HEART RATE: 81 BPM | BODY MASS INDEX: 31.96 KG/M2 | TEMPERATURE: 97.7 F | SYSTOLIC BLOOD PRESSURE: 157 MMHG | OXYGEN SATURATION: 96 % | DIASTOLIC BLOOD PRESSURE: 94 MMHG

## 2023-03-07 DIAGNOSIS — C50.212 MALIGNANT NEOPLASM OF UPPER-INNER QUADRANT OF LEFT BREAST IN FEMALE, ESTROGEN RECEPTOR POSITIVE: Primary | ICD-10-CM

## 2023-03-07 DIAGNOSIS — C50.212 MALIGNANT NEOPLASM OF UPPER-INNER QUADRANT OF LEFT BREAST IN FEMALE, ESTROGEN RECEPTOR POSITIVE: ICD-10-CM

## 2023-03-07 DIAGNOSIS — Z17.0 MALIGNANT NEOPLASM OF UPPER-INNER QUADRANT OF LEFT BREAST IN FEMALE, ESTROGEN RECEPTOR POSITIVE: Primary | ICD-10-CM

## 2023-03-07 DIAGNOSIS — Z17.0 MALIGNANT NEOPLASM OF UPPER-INNER QUADRANT OF LEFT BREAST IN FEMALE, ESTROGEN RECEPTOR POSITIVE: ICD-10-CM

## 2023-03-07 LAB
ALBUMIN SERPL-MCNC: 4.4 G/DL (ref 3.5–5.2)
ALBUMIN/GLOB SERPL: 1.8 G/DL (ref 1.1–2.4)
ALP SERPL-CCNC: 124 U/L (ref 38–116)
ALT SERPL W P-5'-P-CCNC: 58 U/L (ref 0–33)
ANION GAP SERPL CALCULATED.3IONS-SCNC: 12.9 MMOL/L (ref 5–15)
AST SERPL-CCNC: 37 U/L (ref 0–32)
BASOPHILS # BLD AUTO: 0.02 10*3/MM3 (ref 0–0.2)
BASOPHILS NFR BLD AUTO: 0.4 % (ref 0–1.5)
BILIRUB SERPL-MCNC: 0.4 MG/DL (ref 0.2–1.2)
BUN SERPL-MCNC: 12 MG/DL (ref 6–20)
BUN/CREAT SERPL: 15.6 (ref 7.3–30)
CALCIUM SPEC-SCNC: 10.2 MG/DL (ref 8.5–10.2)
CHLORIDE SERPL-SCNC: 102 MMOL/L (ref 98–107)
CO2 SERPL-SCNC: 26.1 MMOL/L (ref 22–29)
CREAT SERPL-MCNC: 0.77 MG/DL (ref 0.6–1.1)
DEPRECATED RDW RBC AUTO: 45.1 FL (ref 37–54)
EGFRCR SERPLBLD CKD-EPI 2021: 87.9 ML/MIN/1.73
EOSINOPHIL # BLD AUTO: 0.55 10*3/MM3 (ref 0–0.4)
EOSINOPHIL NFR BLD AUTO: 9.9 % (ref 0.3–6.2)
ERYTHROCYTE [DISTWIDTH] IN BLOOD BY AUTOMATED COUNT: 13.6 % (ref 12.3–15.4)
GLOBULIN UR ELPH-MCNC: 2.5 GM/DL (ref 1.8–3.5)
GLUCOSE SERPL-MCNC: 172 MG/DL (ref 74–124)
HCT VFR BLD AUTO: 42.9 % (ref 34–46.6)
HGB BLD-MCNC: 13.8 G/DL (ref 12–15.9)
IMM GRANULOCYTES # BLD AUTO: 0.03 10*3/MM3 (ref 0–0.05)
IMM GRANULOCYTES NFR BLD AUTO: 0.5 % (ref 0–0.5)
LYMPHOCYTES # BLD AUTO: 1.17 10*3/MM3 (ref 0.7–3.1)
LYMPHOCYTES NFR BLD AUTO: 21.1 % (ref 19.6–45.3)
MCH RBC QN AUTO: 29.2 PG (ref 26.6–33)
MCHC RBC AUTO-ENTMCNC: 32.2 G/DL (ref 31.5–35.7)
MCV RBC AUTO: 90.9 FL (ref 79–97)
MONOCYTES # BLD AUTO: 0.36 10*3/MM3 (ref 0.1–0.9)
MONOCYTES NFR BLD AUTO: 6.5 % (ref 5–12)
NEUTROPHILS NFR BLD AUTO: 3.41 10*3/MM3 (ref 1.7–7)
NEUTROPHILS NFR BLD AUTO: 61.6 % (ref 42.7–76)
NRBC BLD AUTO-RTO: 0 /100 WBC (ref 0–0.2)
PLATELET # BLD AUTO: 239 10*3/MM3 (ref 140–450)
PMV BLD AUTO: 9.9 FL (ref 6–12)
POTASSIUM SERPL-SCNC: 4.7 MMOL/L (ref 3.5–4.7)
PROT SERPL-MCNC: 6.9 G/DL (ref 6.3–8)
RBC # BLD AUTO: 4.72 10*6/MM3 (ref 3.77–5.28)
SODIUM SERPL-SCNC: 141 MMOL/L (ref 134–145)
WBC NRBC COR # BLD: 5.54 10*3/MM3 (ref 3.4–10.8)

## 2023-03-07 PROCEDURE — 36415 COLL VENOUS BLD VENIPUNCTURE: CPT

## 2023-03-07 PROCEDURE — 85025 COMPLETE CBC W/AUTO DIFF WBC: CPT

## 2023-03-07 PROCEDURE — 99214 OFFICE O/P EST MOD 30 MIN: CPT | Performed by: INTERNAL MEDICINE

## 2023-03-07 PROCEDURE — 80053 COMPREHEN METABOLIC PANEL: CPT

## 2023-03-07 NOTE — PROGRESS NOTES
Subjective     REASON FOR follow-up:     1.  PT2N0, anatomic stage IIa, prognostic stage Ia invasive ductal carcinoma of the left breast,  intermediate grade ER positive, MD positive HER-2/juan negative s/p left partial mastectomy with sentinel lymph node biopsy with oncoplastic closure and right reduction for symmetry on 2022.    · Oncotype DX testin, no need for chemotherapy  ·  Femara 2022, started Femara will start towards the end of radiation    2.  S/p right nephrectomy for renal cell carcinoma at age 31        History of Present Illness   Patient is a 62-year-old female with recently diagnosed stage IIa left breast cancer ER/MD positive HER-2/juan negative.  She underwent surgery as of 2022.      Patient's Oncotype DX is back. It is 14. She does not require any chemotherapy. She will require to start on endocrine therapy and being postmenopausal will start her on aromatase inhibitor. She also will require a DEXA scan to evaluate for osteopenia. She is healed up from surgery. She is going to start radiation. Patient is going to receive a total dose of 3990 cGy in 15 fractions followed by boost to the tumor bed 1000 cGy in 5 fractions.    Interval history: Patient is doing well without complaints.  Reviewed screening mammogram from 2023 which is negative.  Patient had undergone CT scan back in 2022 which showed hepatic steatosis and enlarged thyroid gland which could be further assessed by ultrasound if needed.  Patient is asymptomatic from the thyroid gland.    She is tolerating Femara very well.  She has a cough likely related to lisinopril.  It is nonproductive and its intermittent.  She is going to discuss with her primary care about switching lisinopril as it started after that.    Oncologic history:  patient is a 61-year-old female who is a  for [employer]. She recently back in  noticed a mass  in the left breast upper inner quadrant. She was playing with a cat and noticed some pain in the breast at which time she felt the breast and felt this mass. She has had history of nipple discharge on and off for years in the left breast but that has been very minimal. There is no bloody discharge. She has not had mammogram for years, at least 25 years according to her. So when she felt the mass she went to her primary care physician Dr. Zarina Tineo who ordered a diagnostic mammogram and found in abnormality in the left breast at 10 o'clock position which was 2.6 x 2.5 x 2.3 cm. Subsequently she underwent a biopsy which was consistent with invasive ductal carcinoma ER/IN positive HER-2/juan negative.    She does have a family history of malignancy with her grandmother having: And breast cancer on her maternal side. Her maternal great-grandmother had breast cancer. Her mother had thyroid cancer. Patient has had renal cell cancer at age 31 and had to undergo left nephrectomy. She also had a thyroid nodule for which she was seen at [place] by physician. He had placed her on Synthroid and she had a very severe allergic reaction to Synthroid with swelling and he discontinued that. Patient was to be followed in a years time and has not gone.. But she has not had any issues with that    More recently she lost her  3 weeks ago as he was dealing with colon cancer. He was 70 years of age. He did not want any treatment. She had to take care of of him for the last 16 months and if no her symptoms. 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    Details are as follows    November 10, 2021: Bilateral diagnostic mammogram and ultrasound  FINDINGS: Bilateral digital CC and MLO mammographic and Tomosynthesis  images were obtained. No prior examination is available for comparison.  Scattered fibroglandular densities are seen throughout both breasts. In  the  posterior one-third upper inner quadrant of the left breast at the  site of palpable concern corresponding to the overlying triangular skin  marker, there is an irregular mass that measures on the order of 2.6 x  2.5 x 2.3 cm. Internal coarse calcifications and areas of fat necrosis  are noted. I see no other dominant masses in either breast. No areas of  architectural distortion are appreciated. There is no evidence for skin  thickening, nipple retraction or axillary adenopathy.     ULTRASOUND: Targeted sonographic evaluation of the left breast was  performed through the area of palpable concern which corresponds to the  10-o'clock position on the order of 5 cm from the nipple. At this  location, there is an irregular hypoechoic mass with internal  calcifications. The mass measures 2.4 x 1.9 x 1.4 cm. No detectable  internal vascularity is appreciated. Posterior acoustic shadowing is  noted.     IMPRESSION:  1. There is an irregular mass in the left breast at the 10-o'clock  position corresponding to the site of palpable concern. Sonographically,  the mass measures on the order of 2.4 cm in greatest dimension.  Correlation with an ultrasound-guided left breast biopsy is recommended.  2. There are no findings suspicious for malignancy in the right breast.     November 9, 2021: Ultrasound-guided left breast biopsy showed    Final Diagnosis   1. Left Breast, 10 O'clock, 5 cm from Nipple, Ultrasound-Guided Biopsies for a Mass: INVASIVE MAMMARY                CARCINOMA, NO SPECIAL TYPE (INVASIVE DUCTAL CARCINOMA).               A. Histologic grade: Butte histologic score II (tubules = 3, nuclei = 2, mitosis = 1).               B. Largest contiguous invasive focus measures 10 mm in a core.               C. No definitive in situ component identified.                       D. No lymphovascular nor perineural invasion identified.     Estrogen receptor: %  Progesterone receptor: %  HER-2/juan: 1+,  negative  Ki-67: 8%    December 28, 2021: MRI of the breast    FINDINGS:Scattered fibroglandular tissue is seen throughout both  breasts. Minimal background parenchymal enhancement of both breasts is  noted.     Within the posterior one-third upper inner quadrant of the left breast  centered at the 10 o'clock position on the order of 8 cm from nipple  there is an irregular enhancing mass that measures on the order of 2.5  cm in anterior to posterior dimension, 2.8 cm in the mediolateral  dimension and 2.1 cm in the superior-inferior dimension. A signal void  is seen within the mass that corresponds to a bowtie-shaped metallic  clip. This represents the biopsy-proven site of malignancy.     No other areas of abnormal enhancement or morphology are seen within the  left breast. I see no evidence for abnormal left breast skin, nipple or  chest wall enhancement.  There is no evidence for left axillary or  internal mammary chain adenopathy.     There are no areas of abnormal enhancement or morphology in the right  breast. I see no evidence for abnormal right breast skin, nipple or  chest wall enhancement and there is no evidence for right axillary or  internal mammary chain adenopathy.     IMPRESSION:  1. Biopsy-proven malignancy in the left breast at the 10 o'clock  position in the posterior one-third that measures on the order of 2.8 cm  in greatest dimension with a centrally located bowtie shaped metallic  clip. No other suspicious findings are seen within the left breast and  there is no evidence for left axillary or internal mammary chain  adenopathy.  2. There are no findings suspicious for malignancy in the right breast.     BI-RADS category 6: Known malignancy.     This report was finalized on 12/28/2021 7:24 AM by Dr. Sixto Ramos M.D.     Patient has been scheduled for further recommendations. She has appointment with Dr. Beth Delong at 11 AM today.    January 17, 2022: Patient is s/p surgery left  partial mastectomy with sentinel lymph node biopsy      Synoptic Checklist  INVASIVE CARCINOMA OF THE BREAST: Resection (INVASIVE CARCINOMA OF THE BREAST: COMPLETE EXCISION - All Specimens)  Specimen Laterality Left  .  TUMOR  Tumor Site Upper outer quadrant  Clock position  10 o'clock  Tumor Site Distance from nipple (Centimeters): 5 cm  Histologic Type Invasive carcinoma of no special type (ductal)  Histologic Grade (Eldon Histologic Score)  Glandular (Acinar) / Tubular Differentiation Score 3  Nuclear Pleomorphism Score 2  Mitotic Rate Score 1  Overall Grade Grade 2 (scores of 6 or 7)  Tumor Size Greatest dimension of largest invasive focus (Millimeters): 30 mm  Tumor Focality Single focus of invasive carcinoma  Ductal Carcinoma In Situ (DCIS) Not identified  Lobular Carcinoma In Situ (LCIS) Not identified  Tumor Extent  Lymphovascular Invasion Not identified  Dermal Lymphovascular Invasion Not identified  Microcalcifications Present in invasive carcinoma  Present in non-neoplastic tissue  Treatment Effect in the Breast No known presurgical therapy  .  MARGINS  Margin Status for Invasive Carcinoma All margins negative for invasive carcinoma  Distance from Invasive Carcinoma to Closest Margin 12.8 mm  Closest Margin(s) to Invasive Carcinoma Inferior  Distance from Invasive Carcinoma to Posterior Margin 23 mm  Distance from Invasive Carcinoma to Superior Margin 13 mm  Distance from Invasive Carcinoma to Medial Margin  Comment  The patient's corresponding left breast core biopsy material is pulled and reviewed for comparison ([id number]). The  tumors appear similar; no discordance is noted.  Selected slides from this case are shared in internal consultation with Bam Campbell and  who concur.  [facility]/kds  .  REGIONAL LYMPH NODES  Regional Lymph Node Status All regional lymph nodes negative for tumor  Total Number of Lymph Nodes Examined (sentinel and non-sentinel) 1  Number of [place] Nodes Examined  1  .  PATHOLOGIC STAGE CLASSIFICATION (pTNM, AJCC 8th Edition)    pT Category pT2  Regional Lymph Nodes Modifier (sn): [place] node(s) evaluated.  pN Category pN0    ER %  ND %  HER-2/juan 1±  Ki-67 8%    Oncotype DX 14. No plans for chemo    We will start Femara    Past Medical History:   Diagnosis Date   • Anesthesia complication     SLOW TO WAKE UP   • Arthritis    • Breast cancer (HCC) 2021    Invasive ductal carcinoma of left breast    • GERD (gastroesophageal reflux disease)     history   • History of kidney cancer     S/P right nephrectomy in 1991   • Hypertension    • Tumor, thyroid    • Type 2 diabetes mellitus (HCC)         Past Surgical History:   Procedure Laterality Date   • APPENDECTOMY     • BACK SURGERY  1990   • BREAST BIOPSY Right    • BREAST LUMPECTOMY WITH SENTINEL NODE BIOPSY Left 01/17/2022    Procedure: Left ultrasound-guided partial mastectomy and sentinel lymph node biopsy;  Surgeon: Beth Delong MD;  Location: [facility];  Service: General;  Laterality: Left;   • BREAST RECONSTRUCTION Bilateral 01/17/2022    Procedure: Right breast reduction/mastopexy, left breast oncoplastic closure, bilateral chest liposuction;  Surgeon: Simon Castro MD;  Location: [facility];  Service: Plastics;  Laterality: Bilateral;   • BREAST RECONSTRUCTION Bilateral 10/26/2022    Procedure: BREAST RECONSTRUCTION REVISION with skin tailoring, fat graft and induration resection.;  Surgeon: Simon Castro MD;  Location: [facility];  Service: Plastics;  Laterality: Bilateral;   • COLONOSCOPY     • FAT GRAFTING Bilateral 10/26/2022    Procedure: FAT GRAFTING;  Surgeon: Simon Castro MD;  Location: [facility];  Service: Plastics;  Laterality: Bilateral;   • HYSTERECTOMY  1992   • KNEE SURGERY Right 2001    RECONSTRUCTION   • KNEE SURGERY Left 2003    RECONSTRUCTION   • LASIK Bilateral    • NEPHRECTOMY Right 1991   • REDUCTION MAMMAPLASTY  2008   • THYROID  BIOPSY Bilateral    • TONSILLECTOMY          Current Outpatient Medications on File Prior to Visit   Medication Sig Dispense Refill   • amLODIPine (NORVASC) 10 MG tablet TAKE 1 TABLET BY MOUTH DAILY 90 tablet 1   • Blood Glucose Monitoring Suppl (OneTouch Verio Reflect) w/Device kit See Admin Instructions.     • glucose blood test strip 1 each by Other route Daily. 25 each 2   • Lancets (freestyle) lancets 1 each by Other route Daily. 100 each 4   • letrozole (FEMARA) 2.5 MG tablet Take 1 tablet by mouth Daily. 90 tablet 2   • lisinopril (PRINIVIL,ZESTRIL) 10 MG tablet TAKE 1 TABLET BY MOUTH DAILY 90 tablet 0   • metFORMIN ER (GLUCOPHAGE-XR) 500 MG 24 hr tablet TAKE 2 TABLETS BY MOUTH TWICE DAILY WITH MEALS 360 tablet 0   • Semaglutide,0.25 or 0.5MG/DOS, (Ozempic, 0.25 or 0.5 MG/DOSE,) 2 MG/1.5ML solution pen-injector Inject 0.25 mg under the skin into the appropriate area as directed 1 (One) Time Per Week. 1.5 mL 3   • sulfamethoxazole-trimethoprim (Bactrim DS) 800-160 MG per tablet Take 1 tablet by mouth 2 (Two) Times a Day. 20 tablet 0     No current facility-administered medications on file prior to visit.        ALLERGIES:    Allergies   Allergen Reactions   • Hydrocodone Anaphylaxis     PER PATIENT   • Penicillins Itching        Social History     Socioeconomic History   • Marital status:      Spouse name: Lucas   • Number of children: 0   • Highest education level: Bachelor's degree (e.g., BA, AB, BS)   Tobacco Use   • Smoking status: Former     Packs/day: 0.00     Years: 0.00     Pack years: 0.00     Types: Cigarettes     Quit date: 1991     Years since quittin.2   • Smokeless tobacco: Never   Vaping Use   • Vaping Use: Never used   Substance and Sexual Activity   • Alcohol use: Not Currently   • Drug use: Never   • Sexual activity: Not Currently     Partners: Male     Birth control/protection: Post-menopausal, None        Family History   Problem Relation Age of Onset   • Diabetes type  II Mother    • Hypertension Mother    • Thyroid cancer Mother    • Breast cancer Maternal Grandmother    • Colon cancer Maternal Grandmother    • Diabetes Maternal Grandmother    • Stroke Maternal Grandmother    • Alzheimer's disease Maternal Grandfather    • Malig Hyperthermia Neg Hx         Review of Systems   Constitutional: Positive for fatigue. Negative for appetite change, chills, diaphoresis, fever and unexpected weight change.   HENT: Negative for hearing loss, sore throat and trouble swallowing.    Respiratory: Negative for cough, chest tightness, shortness of breath and wheezing.    Cardiovascular: Negative for chest pain, palpitations and leg swelling.   Gastrointestinal: Positive for nausea. Negative for abdominal distention, abdominal pain, constipation, diarrhea and vomiting.   Genitourinary: Negative for dysuria, frequency, hematuria and urgency.   Musculoskeletal: Negative for joint swelling.        No muscle weakness.   Skin: Negative for rash and wound.   Neurological: Positive for headaches. Negative for seizures, syncope, speech difficulty, weakness and numbness.   Hematological: Negative for adenopathy. Does not bruise/bleed easily.   Psychiatric/Behavioral: Positive for sleep disturbance. Negative for behavioral problems, confusion and suicidal ideas.   All other systems reviewed and are negative.  I have reviewed and confirmed the accuracy of the ROS as documented by the MA/LPN/RN Marisa King MD      Family history:  Mother had thyroid cancer. Grandmother had colon cancer and breast cancer on the maternal side and great grandmother had breast cancer on the maternal side. Patient has had thyroid nodule which was biopsied 10 years ago and benign. She had renal cell cancer s/p left nephrectomy.    OB/GYN history  Age of menstruation: 13 years  Age of menopause: 50+ years   0 para 0, no miscarriages  Patient was exposed to birth control pills only the posterior of her marriage and took  "it just for 1 year  Patient did not take any postmenopausal hormone replacement therapy  Patient had hysterectomy 30 years ago.    Social history: She lost her  just recently and is willing upset about that. She works as a  in 25eight. She smoked just a few cigarettes for short very short time when she was in college. She does not drink alcohol. She has no kids.    Past medical history:  Hypertension, followed by her primary care  Diabetes mellitus for which she is on Metformin  Thyroid nodule which on biopsy was benign in the past    Objective     Vitals:    03/07/23 1148   BP: 157/94   Pulse: 81   Resp: 16   Temp: 97.7 °F (36.5 °C)   TempSrc: Temporal   SpO2: 96%   Weight: 92.4 kg (203 lb 9.6 oz)   Height: 170.2 cm (67.01\")   PainSc: 0-No pain     Current Status 3/7/2023   ECOG score 0       Physical Exam      Patient screened positive for depression based on a PHQ-9 score of 0 on 3/7/2023. Follow-up recommendations include: PCP managing depression.      CONSTITUTIONAL:  Vital signs reviewed.  No distress, looks comfortable.  EYES:  Conjunctivae and lids unremarkable.  PERRLA  EARS,NOSE,MOUTH,THROAT:  Ears and nose appear unremarkable.  Lips, teeth, gums appear unremarkable.  RESPIRATORY:  Normal respiratory effort.  Lungs clear to auscultation bilaterally.  BREAST: Right breast: No skin changes, no evidence of breast mass, no nipple discharge, no evidence of any right axillary adenopathy or right supraclavicular adenopathy  Left breast: No evidence of any skin changes, no evidence of any left breast mass and no evidence of left nipple discharge as well as no left axillary adenopathy or left supraclavicular adenopathy.  CARDIOVASCULAR:  Normal S1, S2.  No murmurs rubs or gallops.  No significant lower extremity edema.  GASTROINTESTINAL: Abdomen appears unremarkable.  Nontender.  No hepatomegaly.  No splenomegaly.  LYMPHATIC:  No cervical, supraclavicular, axillary " lymphadenopathy.  SKIN:  Warm.  No rashes.  PSYCHIATRIC:  Normal judgment and insight.  Normal mood and affect.    I have reexamined the patient and the results are consistent with the previously documented exam. Marisa King MD   RECENT LABS:  Hematology WBC   Date Value Ref Range Status   03/07/2023 5.54 3.40 - 10.80 10*3/mm3 Final   03/22/2022 6.6 3.4 - 10.8 x10E3/uL Final     RBC   Date Value Ref Range Status   03/07/2023 4.72 3.77 - 5.28 10*6/mm3 Final   03/22/2022 4.99 3.77 - 5.28 x10E6/uL Final     Hemoglobin   Date Value Ref Range Status   03/07/2023 13.8 12.0 - 15.9 g/dL Final     Hematocrit   Date Value Ref Range Status   03/07/2023 42.9 34.0 - 46.6 % Final     Platelets   Date Value Ref Range Status   03/07/2023 239 140 - 450 10*3/mm3 Final          Assessment & Plan     1.  T2 N0 M0, anatomic stage IIa , prognostic stage Ib invasive ductal carcinoma of the left breast at 10 o'clock position, grade 2, ER 91 x 100%, OK 91/100%, HER-2/juan 1+, Ki-67 8%.  No evidence of lymphovascular space invasion or perineural invasion  · Patient has not had screening mammogram for 25 years  · She felt a mass in September 2021 when she was holding the cat and noticed pain in the left breast upper inner quadrant and then she felt the mass, she was seen by her primary care physician  · Diagnostic mammogram, November 10, 2021: Bilateral showed a 2.5 x 2.6 x 2.3 cm mass in the posterior one third upper inner quadrant of the left breast.  Internal coarse calcification and areas of fat necrosis were noted.  No other dominant no masses were seen.  There was no skin thickening, nipple retraction or axillary adenopathy  · Ultrasound showed a 2.4 x 1.9 x 1.4 cm mass in the 10 o'clock position 5 cm from the nipple  · December 2, 2021 ultrasound-guided left breast biopsy showed invasive ductal carcinoma, grade 2 with a Alfred score of 6, %, %, HER-2/juan 1+, Ki-67 8%  · December 23, 2021: Patient underwent MRI of the  breast which showed a 2.5 x 2.8 x 2.1 cm irregular enhancing mass at 10 o'clock position, 8 cm from the nipple.  There was no evidence of any left axillary or internal mammary chain lymphadenopathy.  Right breast was negative  · I had a lengthy discussion with the patient about the nature of the breast cancer that was present.  We told her that was the most common type of breast cancer and given that it was ER/PA positive HER-2/juan negative without any lymphovascular or perineural invasion and Ki-67 of 8% it does not appear to be a highly aggressive disease.  He states that the lump did not increase in size since she first noticed it.  · We then discussed about options of therapy of surgery upfront, followed by evaluation for Oncotype DX testing to see if she will be eligible for any chemotherapy and also will be eligible for endocrine therapy.  · Patient is to see Dr. Beth Delong  today and we will await her input, I did discuss that likely the best option is for consideration of surgery upfront and will await to hear from Dr. Delong  · However patient has family history of breast cancer in her maternal grandmother and great-grandmother and colon cancer in her maternal grandmother, thyroid cancer in her mother and personal history of renal cell cancer.  · We then discussed about obtaining INVITAE genetic test today and has been ordered in our office for the 9 breast cancer gene with reflux to be 47 gene panel.  · I told her surgical options will be discussed by Dr. Delong.  · January 17, 2022 Left breast lumpectomy, grade 2 invasive ductal carcinoma measuring 30 mm with perineural invasion and microcalcifications identified adjacent to the clip and biopsy site change.  Focal minimal associated atypical ductal hyperplasia but no definite in situ carcinoma is identified all margins are free of tumor closest margin being 2.8 mm.  Incidental fibroadenoma 2.6 mm maximally with focal fat necrosis.  0 of 1 sentinel  lymph node negative.  Right breast mammoplasty was benign  · February 25, 2022: Patient already seen by radiation oncology with plans to start radiation.  · Oncotype DX testing shows score of 14, her benefit from chemo is less than 1%  · February 2022: Started Femara   · March 8, 2023: Tolerating Femara.  Patient had mammogram January 2023 which is negative.      2.  Family history of malignancy:  · However patient has family history of breast cancer in her maternal grandmother and great-grandmother and colon cancer in her maternal grandmother, thyroid cancer in her mother and personal history of renal cell cancer.  · We then discussed about obtaining INVITAE genetic test today and has been ordered in our office for the 9 breast cancer gene with reflux to be 47 gene panel.  · INVITAE genetic test negative for 47 genes    3.  Hypertension currently on lisinopril followed by primary care physician.  Today mildly elevated blood pressure  · Stable    4.  History of diabetes mellitus currently on Metformin    5.  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  History of renal cell cancer s/p left nephrectomy without evidence of recurrence and at age 31    7.  Osteopenia: Reviewed the bone density which is within normal limits    Plan  · Continue Femara  · Continue calcium and vitamin D supplements  · Reviewed mammogram from January 2023 negative  · Follow-up with me in 6 months with labs    Monitoring high risk medication    MD Dr. Zarina Hernandez Dr., Dr.

## 2023-03-09 ENCOUNTER — TELEPHONE (OUTPATIENT)
Dept: PLASTIC SURGERY | Facility: CLINIC | Age: 63
End: 2023-03-09
Payer: COMMERCIAL

## 2023-03-09 NOTE — TELEPHONE ENCOUNTER
Patient called stating her right breast has started draining again, yellow pus, denies odor, redness, warmth and fever. She states no pain, she is not currently on any antibiotics. Asked her to upload picture but she states we would not be able to see anything due to the location. What do you recommend?

## 2023-03-16 RX ORDER — LETROZOLE 2.5 MG/1
2.5 TABLET, FILM COATED ORAL DAILY
Qty: 90 TABLET | Refills: 1 | Status: SHIPPED | OUTPATIENT
Start: 2023-03-16

## 2023-03-20 NOTE — PROGRESS NOTES
"Chief Complaint  Post-op Follow-up (F/u on wound )    Subjective          Wound Check      Daysi Lugo is a 63 y.o. female who presents to [facility]ON[facility]GERY for Postoperative Follow-Up of BREAST RECONSTRUCTION REVISION with skin tailoring, fat graft and induration resection 10/26/22    She is 5m post op and here for follow up on wound.    Pt states her rt breast wound is still leaking and the tape has made her very raw.        Allergies: Hydrocodone and Penicillins  Allergies Reconciled.    Review of Systems   All review of system has been reviewed and it  is negative except the ones note above.     Objective     /96 (BP Location: Right arm, Patient Position: Sitting, Cuff Size: Adult)   Pulse 91   Temp 97 °F (36.1 °C) (Temporal)   Ht 170.2 cm (67.01\")   SpO2 96%   BMI 31.88 kg/m²     Body mass index is 31.88 kg/m².    Physical Exam   Cardiovascular: Normal rate.     Pulmonary/Chest  Effort normal.        Breast:  Incision with small drainage   Result Review :                Assessment and Plan      Diagnoses and all orders for this visit:    1. Breast infection (Primary)        Plan:   Local wound care     Follow Up     No follow-ups on file.    Patient was given instructions and counseling regarding her condition. Please see specific information pulled into the AVS if appropriate.     Simon Castro MD  03/27/2023  "

## 2023-03-27 ENCOUNTER — OFFICE VISIT (OUTPATIENT)
Dept: PLASTIC SURGERY | Facility: CLINIC | Age: 63
End: 2023-03-27
Payer: COMMERCIAL

## 2023-03-27 VITALS
SYSTOLIC BLOOD PRESSURE: 142 MMHG | OXYGEN SATURATION: 96 % | HEIGHT: 67 IN | BODY MASS INDEX: 31.88 KG/M2 | DIASTOLIC BLOOD PRESSURE: 96 MMHG | HEART RATE: 91 BPM | TEMPERATURE: 97 F

## 2023-03-27 DIAGNOSIS — N61.0 BREAST INFECTION: Primary | ICD-10-CM

## 2023-03-27 PROCEDURE — 99212 OFFICE O/P EST SF 10 MIN: CPT | Performed by: SURGERY

## 2023-03-29 ENCOUNTER — OFFICE VISIT (OUTPATIENT)
Dept: FAMILY MEDICINE CLINIC | Facility: CLINIC | Age: 63
End: 2023-03-29
Payer: COMMERCIAL

## 2023-03-29 VITALS
OXYGEN SATURATION: 97 % | HEART RATE: 91 BPM | HEIGHT: 67 IN | DIASTOLIC BLOOD PRESSURE: 88 MMHG | BODY MASS INDEX: 31.86 KG/M2 | WEIGHT: 203 LBS | SYSTOLIC BLOOD PRESSURE: 133 MMHG

## 2023-03-29 DIAGNOSIS — E01.0 THYROMEGALY: ICD-10-CM

## 2023-03-29 DIAGNOSIS — I10 PRIMARY HYPERTENSION: ICD-10-CM

## 2023-03-29 DIAGNOSIS — Z00.00 ENCOUNTER FOR WELLNESS EXAMINATION IN ADULT: Primary | ICD-10-CM

## 2023-03-29 DIAGNOSIS — R05.9 COUGH, UNSPECIFIED TYPE: ICD-10-CM

## 2023-03-29 DIAGNOSIS — Z79.899 ENCOUNTER FOR LONG-TERM (CURRENT) USE OF MEDICATIONS: ICD-10-CM

## 2023-03-29 DIAGNOSIS — Z12.11 SCREEN FOR COLON CANCER: ICD-10-CM

## 2023-03-29 DIAGNOSIS — R80.9 PROTEINURIA, UNSPECIFIED TYPE: ICD-10-CM

## 2023-03-29 DIAGNOSIS — E11.9 TYPE 2 DIABETES MELLITUS WITHOUT COMPLICATION, WITHOUT LONG-TERM CURRENT USE OF INSULIN: ICD-10-CM

## 2023-03-29 LAB
EXPIRATION DATE: ABNORMAL
HBA1C MFR BLD: 8.8 %
Lab: ABNORMAL
POC CREATININE URINE: 100
POC MICROALBUMIN URINE: 80

## 2023-03-29 RX ORDER — VALSARTAN 80 MG/1
80 TABLET ORAL DAILY
Qty: 30 TABLET | Refills: 2 | Status: SHIPPED | OUTPATIENT
Start: 2023-03-29

## 2023-03-29 RX ORDER — SEMAGLUTIDE 0.68 MG/ML
0.25 INJECTION, SOLUTION SUBCUTANEOUS WEEKLY
Qty: 3 ML | Refills: 0 | Status: SHIPPED | OUTPATIENT
Start: 2023-03-29

## 2023-03-29 NOTE — PROGRESS NOTES
Subjective   Daysi Lugo is a 63 y.o. female who presents for annual female wellness exam.  Chief Complaint   Patient presents with   • Annual Exam     Patient is also here today to follow-up on her chronic health conditions:    Hypertension.  She is currently taking lisinopril to 10 mg daily and amlodipine 10 mg daily.  Patient states that she has no side effects. BP are well controlled.    Type 2 diabetes.  She was diagnosed by a A1c of 8.0 on September 23, 2021.  She is taking metformin 1000mg BID.  She is tolerating the medication well and denies any side effects.  Patient has noticed higher blood sugar readings lately.  She states she has not been as good with her diet lately    Thyromegaly.  The patient has had a history of prior thyroid nodules.  She was also started on Synthroid in the past and had an allergic reaction to it so she never went back to the doctor.  Recently however the patient has been having a dry cough and the feeling of swelling in her throat.  Her mother has a history of thyroid cancer.     Menstrual History: 1992 hysterectomy, DUB  Pregnancy History: G0  Sexual History: Abstinence,   Contraception: Hysterectomy  Hormone Replacement Therapy: NA  Diet: as healthy as possible  Exercise: no routine  Do you feel safe? Yes  Have you ever been abused? No    Mammogram: 10/14/2022 per Onc  Pap Smear: Hysterectomy for DUB  Bone Density: 5/5/2022, Normal   Colon Cancer Screening: c-scope about 10 years, normal.  (Maternal GM with Colon CA)    Immunization History   Administered Date(s) Administered   • COVID-19 (PFIZER) PURPLE CAP 04/17/2021, 05/08/2021   • Covid-19 (Pfizer) Gray Cap 03/24/2022   • Flu Vaccine Split Quad 10/09/2019, 10/09/2019, 10/07/2020, 10/07/2020   • FluLaval/Fluzone >6mos 12/06/2021       The following portions of the patient's history were reviewed and updated as appropriate: allergies, current medications, past family history, past medical history, past social  history, past surgical history and problem list.    Past Medical History:   Diagnosis Date   • Anesthesia complication     SLOW TO WAKE UP   • Arthritis    • Breast cancer (HCC) 2021    Invasive ductal carcinoma of left breast    • GERD (gastroesophageal reflux disease)     history   • History of kidney cancer     S/P right nephrectomy in 1991   • Hypertension    • Tumor, thyroid    • Type 2 diabetes mellitus (HCC)        Past Surgical History:   Procedure Laterality Date   • APPENDECTOMY     • BACK SURGERY  1990   • BREAST BIOPSY Right    • BREAST LUMPECTOMY WITH SENTINEL NODE BIOPSY Left 01/17/2022    Procedure: Left ultrasound-guided partial mastectomy and sentinel lymph node biopsy;  Surgeon: Beth Delong MD;  Location: [facility];  Service: General;  Laterality: Left;   • BREAST RECONSTRUCTION Bilateral 01/17/2022    Procedure: Right breast reduction/mastopexy, left breast oncoplastic closure, bilateral chest liposuction;  Surgeon: Simon Castro MD;  Location: [facility];  Service: Plastics;  Laterality: Bilateral;   • BREAST RECONSTRUCTION Bilateral 10/26/2022    Procedure: BREAST RECONSTRUCTION REVISION with skin tailoring, fat graft and induration resection.;  Surgeon: Simon Castro MD;  Location: [facility];  Service: Plastics;  Laterality: Bilateral;   • COLONOSCOPY     • FAT GRAFTING Bilateral 10/26/2022    Procedure: FAT GRAFTING;  Surgeon: Simon Castro MD;  Location: [facility];  Service: Plastics;  Laterality: Bilateral;   • HYSTERECTOMY  1992   • KNEE SURGERY Right 2001    RECONSTRUCTION   • KNEE SURGERY Left 2003    RECONSTRUCTION   • LASIK Bilateral    • NEPHRECTOMY Right 1991   • REDUCTION MAMMAPLASTY  2008   • THYROID BIOPSY Bilateral 2010   • TONSILLECTOMY         Family History   Problem Relation Age of Onset   • Diabetes type II Mother    • Hypertension Mother    • Thyroid cancer Mother    • Breast cancer Maternal Grandmother    • Colon cancer  Maternal Grandmother    • Diabetes Maternal Grandmother    • Stroke Maternal Grandmother    • Alzheimer's disease Maternal Grandfather    • Malig Hyperthermia Neg Hx        Social History     Socioeconomic History   • Marital status:      Spouse name: Lucas   • Number of children: 0   • Highest education level: Bachelor's degree (e.g., BA, AB, BS)   Tobacco Use   • Smoking status: Former     Packs/day: 0.00     Years: 0.00     Pack years: 0.00     Types: Cigarettes     Quit date: 1991     Years since quittin.2   • Smokeless tobacco: Never   Vaping Use   • Vaping Use: Never used   Substance and Sexual Activity   • Alcohol use: Not Currently   • Drug use: Never   • Sexual activity: Not Currently     Partners: Male     Birth control/protection: Post-menopausal, None       Review of Systems   Constitutional: Negative for activity change, appetite change, fatigue and unexpected weight change.   HENT: Negative for congestion, ear pain, nosebleeds, sore throat and tinnitus.    Eyes: Negative for pain, redness and visual disturbance.   Respiratory: Negative for cough, shortness of breath and wheezing.    Cardiovascular: Negative for chest pain, palpitations and leg swelling.   Gastrointestinal: Negative for abdominal pain, blood in stool and nausea.   Endocrine: Negative for polydipsia and polyuria.   Genitourinary: Negative for dysuria, frequency, menstrual problem and vaginal discharge.   Musculoskeletal: Negative for arthralgias, joint swelling and myalgias.   Skin: Negative for rash.   Allergic/Immunologic: Negative for environmental allergies, food allergies and immunocompromised state.   Neurological: Negative for dizziness, speech difficulty, weakness and headaches.   Hematological: Negative for adenopathy. Does not bruise/bleed easily.   Psychiatric/Behavioral: Negative for decreased concentration and dysphoric mood. The patient is not nervous/anxious.        Objective   Vitals:    23 1514    BP: 133/88   Pulse: 91   SpO2: 97%     Body mass index is 31.78 kg/m².  Physical Exam  Vitals and nursing note reviewed.   Constitutional:       Appearance: Normal appearance. She is well-developed.   HENT:      Head: Normocephalic and atraumatic.      Right Ear: External ear normal.      Left Ear: External ear normal.      Nose: Nose normal.   Eyes:      General: No scleral icterus.     Conjunctiva/sclera: Conjunctivae normal.   Neck:      Thyroid: Thyromegaly present. No thyroid mass or thyroid tenderness.   Cardiovascular:      Rate and Rhythm: Normal rate and regular rhythm.      Heart sounds: Normal heart sounds.   Pulmonary:      Effort: Pulmonary effort is normal.      Breath sounds: Normal breath sounds.   Musculoskeletal:      Cervical back: Normal range of motion and neck supple.      Right lower leg: No edema.      Left lower leg: No edema.   Lymphadenopathy:      Cervical: No cervical adenopathy.   Skin:     General: Skin is warm and dry.      Findings: No rash.   Neurological:      Mental Status: She is alert and oriented to person, place, and time.   Psychiatric:         Mood and Affect: Mood normal.         Behavior: Behavior normal.         Thought Content: Thought content normal.         Judgment: Judgment normal.           Assessment & Plan   Diagnoses and all orders for this visit:    1. Encounter for wellness examination in adult (Primary)    2. Type 2 diabetes mellitus without complication, without long-term current use of insulin ([facility])  -     POC Glycosylated Hemoglobin (Hb A1C)  -     POC Microalbumin  -     Comprehensive Metabolic Panel  -     Semaglutide,0.25 or 0.5MG/DOS, (Ozempic, 0.25 or 0.5 MG/DOSE,) 2 MG/3ML solution pen-injector; Inject 0.25 mg under the skin into the appropriate area as directed 1 (One) Time Per Week.  Dispense: 3 mL; Refill: 0    3. Screen for colon cancer  -     Ambulatory Referral to General Surgery    4. Cough, unspecified type    5. Primary hypertension  -      Comprehensive Metabolic Panel  -     valsartan (Diovan) 80 MG tablet; Take 1 tablet by mouth Daily.  Dispense: 30 tablet; Refill: 2    6. Encounter for long-term (current) use of medications  -     TSH  -     Comprehensive Metabolic Panel  -     CBC & Differential  -     Lipid Panel    7. Thyromegaly  -     US Thyroid; Future    8. Proteinuria, unspecified type  -     valsartan (Diovan) 80 MG tablet; Take 1 tablet by mouth Daily.  Dispense: 30 tablet; Refill: 2      RHM.  Order for C-scope entered.  Patient is up-to-date with mammogram through Onc.  DEXA is up-to-date.  Lipids today    Uncontrolled diabetes.  The patient will be started on Ozempic.  She will contact me in 4 weeks for dose adjustment.  Will check TSH and thyroid ultrasound.  Surveillance labs were obtained today and any medication changes will be made based on lab results and will be called to the patient later this week.    Thyromegaly.  Will obtain TSH and thyroid ultrasound.  Follow-up will be based on results.    Dry cough.  Will change from lisinopril to valsartan.  Patient needs to remain on an ACE or ARB due to proteinuria and diabetes.     Borderline control of hypertension.  The patient will continue amlodipine 10 mg daily we will change from lisinopril to valsartan as above.  Follow-up in 3 months or sooner if blood pressures remain elevated.    Discussed the importance of maintaining a healthy weight and getting regular exercise.  Educated patient on the benefits of healthy diet.  Advise follow-up annually for wellness exams.      There are no Patient Instructions on file for this visit.

## 2023-03-30 LAB
ALBUMIN SERPL-MCNC: 4.7 G/DL (ref 3.8–4.8)
ALBUMIN/GLOB SERPL: 2 {RATIO} (ref 1.2–2.2)
ALP SERPL-CCNC: 146 IU/L (ref 44–121)
ALT SERPL-CCNC: 49 IU/L (ref 0–32)
AST SERPL-CCNC: 27 IU/L (ref 0–40)
BASOPHILS # BLD AUTO: 0 X10E3/UL (ref 0–0.2)
BASOPHILS NFR BLD AUTO: 1 %
BILIRUB SERPL-MCNC: 0.5 MG/DL (ref 0–1.2)
BUN SERPL-MCNC: 15 MG/DL (ref 8–27)
BUN/CREAT SERPL: 19 (ref 12–28)
CALCIUM SERPL-MCNC: 10.4 MG/DL (ref 8.7–10.3)
CHLORIDE SERPL-SCNC: 99 MMOL/L (ref 96–106)
CHOLEST SERPL-MCNC: 194 MG/DL (ref 100–199)
CO2 SERPL-SCNC: 23 MMOL/L (ref 20–29)
CREAT SERPL-MCNC: 0.78 MG/DL (ref 0.57–1)
EGFRCR SERPLBLD CKD-EPI 2021: 86 ML/MIN/1.73
EOSINOPHIL # BLD AUTO: 0.8 X10E3/UL (ref 0–0.4)
EOSINOPHIL NFR BLD AUTO: 13 %
ERYTHROCYTE [DISTWIDTH] IN BLOOD BY AUTOMATED COUNT: 14.3 % (ref 11.7–15.4)
GLOBULIN SER CALC-MCNC: 2.4 G/DL (ref 1.5–4.5)
GLUCOSE SERPL-MCNC: 171 MG/DL (ref 70–99)
HCT VFR BLD AUTO: 44.3 % (ref 34–46.6)
HDLC SERPL-MCNC: 36 MG/DL
HGB BLD-MCNC: 15 G/DL (ref 11.1–15.9)
IMM GRANULOCYTES # BLD AUTO: 0 X10E3/UL (ref 0–0.1)
IMM GRANULOCYTES NFR BLD AUTO: 0 %
LDLC SERPL CALC-MCNC: 93 MG/DL (ref 0–99)
LYMPHOCYTES # BLD AUTO: 1.5 X10E3/UL (ref 0.7–3.1)
LYMPHOCYTES NFR BLD AUTO: 22 %
MCH RBC QN AUTO: 29.4 PG (ref 26.6–33)
MCHC RBC AUTO-ENTMCNC: 33.9 G/DL (ref 31.5–35.7)
MCV RBC AUTO: 87 FL (ref 79–97)
MONOCYTES # BLD AUTO: 0.4 X10E3/UL (ref 0.1–0.9)
MONOCYTES NFR BLD AUTO: 6 %
NEUTROPHILS # BLD AUTO: 3.8 X10E3/UL (ref 1.4–7)
NEUTROPHILS NFR BLD AUTO: 58 %
PLATELET # BLD AUTO: 258 X10E3/UL (ref 150–450)
POTASSIUM SERPL-SCNC: 4.6 MMOL/L (ref 3.5–5.2)
PROT SERPL-MCNC: 7.1 G/DL (ref 6–8.5)
RBC # BLD AUTO: 5.11 X10E6/UL (ref 3.77–5.28)
SODIUM SERPL-SCNC: 139 MMOL/L (ref 134–144)
TRIGL SERPL-MCNC: 392 MG/DL (ref 0–149)
TSH SERPL DL<=0.005 MIU/L-ACNC: 3.45 UIU/ML (ref 0.45–4.5)
VLDLC SERPL CALC-MCNC: 65 MG/DL (ref 5–40)
WBC # BLD AUTO: 6.5 X10E3/UL (ref 3.4–10.8)

## 2023-04-03 ENCOUNTER — TELEPHONE (OUTPATIENT)
Dept: SURGERY | Facility: CLINIC | Age: 63
End: 2023-04-03
Payer: OTHER MISCELLANEOUS

## 2023-04-19 ENCOUNTER — PATIENT MESSAGE (OUTPATIENT)
Dept: FAMILY MEDICINE CLINIC | Facility: CLINIC | Age: 63
End: 2023-04-19
Payer: OTHER MISCELLANEOUS

## 2023-04-19 DIAGNOSIS — E11.9 TYPE 2 DIABETES MELLITUS WITHOUT COMPLICATION, WITHOUT LONG-TERM CURRENT USE OF INSULIN: ICD-10-CM

## 2023-04-20 ENCOUNTER — HOSPITAL ENCOUNTER (OUTPATIENT)
Dept: ULTRASOUND IMAGING | Facility: HOSPITAL | Age: 63
Discharge: HOME | End: 2023-04-20
Admitting: FAMILY MEDICINE
Payer: OTHER MISCELLANEOUS

## 2023-04-20 DIAGNOSIS — E01.0 THYROMEGALY: ICD-10-CM

## 2023-04-20 PROCEDURE — 76536 US EXAM OF HEAD AND NECK: CPT

## 2023-04-20 RX ORDER — SEMAGLUTIDE 0.68 MG/ML
0.5 INJECTION, SOLUTION SUBCUTANEOUS WEEKLY
Qty: 3 ML | Refills: 0 | Status: SHIPPED | OUTPATIENT
Start: 2023-04-20

## 2023-04-20 NOTE — TELEPHONE ENCOUNTER
From: Daysi Lugo  To: Zarina Tineo  Sent: 4/19/2023 7:55 PM EDT  Subject: Follow Up to Ozempic Medicine    Dr. Tineo,  I am sending this message to follow up on our discussion concerning the Ozempic dosage. I am ready to refill. Do you want the dosage increased?    CD

## 2023-04-25 NOTE — PROGRESS NOTES
"Chief Complaint  Post-op Follow-up (6m post op follow up )    Subjective          Wound Check      Daysi Lugo is a 63 y.o. female who presents to [facility] RECON[facility] SURGERY for Postoperative Follow-Up of BREAST RECONSTRUCTION REVISION with skin tailoring, fat graft and induration resection 10/26/22    She is here for 6m post op and follow up on wound, she states the right breast is still draining but not open.         Allergies: Hydrocodone and Penicillins  Allergies Reconciled.    Review of Systems   All review of system has been reviewed and it  is negative except the ones note above.     Objective     /86 (BP Location: Right arm)   Pulse 94   Temp 97 °F (36.1 °C) (Temporal)   Ht 170.2 cm (67\")   Wt 90.9 kg (200 lb 8 oz)   SpO2 96%   BMI 31.40 kg/m²     Body mass index is 31.4 kg/m².    Physical Exam   Cardiovascular: Normal rate.     Pulmonary/Chest  Effort normal.        Breast: right nipple discharge with 0.5 cm tunnel   Result Review :                Assessment and Plan      Diagnoses and all orders for this visit:    1. Breast infection (Primary)        Plan: silver nitrate to the wound. Follow up in 1 month     Follow Up     No follow-ups on file.    Patient was given instructions and counseling regarding her condition. Please see specific information pulled into the AVS if appropriate.     Simon Castro MD  05/01/2023  "

## 2023-05-01 ENCOUNTER — OFFICE VISIT (OUTPATIENT)
Dept: PLASTIC SURGERY | Facility: CLINIC | Age: 63
End: 2023-05-01
Payer: COMMERCIAL

## 2023-05-01 VITALS
HEIGHT: 67 IN | WEIGHT: 200.5 LBS | BODY MASS INDEX: 31.47 KG/M2 | HEART RATE: 94 BPM | DIASTOLIC BLOOD PRESSURE: 86 MMHG | SYSTOLIC BLOOD PRESSURE: 122 MMHG | OXYGEN SATURATION: 96 % | TEMPERATURE: 97 F

## 2023-05-01 DIAGNOSIS — N61.0 BREAST INFECTION: Primary | ICD-10-CM

## 2023-05-01 PROCEDURE — 99212 OFFICE O/P EST SF 10 MIN: CPT | Performed by: SURGERY

## 2023-06-19 DIAGNOSIS — E11.9 TYPE 2 DIABETES MELLITUS WITHOUT COMPLICATION, WITHOUT LONG-TERM CURRENT USE OF INSULIN: ICD-10-CM

## 2023-06-19 RX ORDER — SEMAGLUTIDE 0.68 MG/ML
0.5 INJECTION, SOLUTION SUBCUTANEOUS WEEKLY
Qty: 3 ML | Refills: 0 | Status: CANCELLED | OUTPATIENT
Start: 2023-06-19

## 2023-08-13 DIAGNOSIS — E11.9 TYPE 2 DIABETES MELLITUS WITHOUT COMPLICATION, WITHOUT LONG-TERM CURRENT USE OF INSULIN: ICD-10-CM

## 2023-08-14 RX ORDER — SEMAGLUTIDE 1.34 MG/ML
INJECTION, SOLUTION SUBCUTANEOUS
Qty: 3 ML | Refills: 12 | Status: SHIPPED | OUTPATIENT
Start: 2023-08-14

## 2023-09-06 ENCOUNTER — PREP FOR SURGERY (OUTPATIENT)
Dept: OTHER | Facility: HOSPITAL | Age: 63
End: 2023-09-06
Payer: COMMERCIAL

## 2023-09-06 DIAGNOSIS — Z12.11 COLON CANCER SCREENING: Primary | ICD-10-CM

## 2023-09-07 PROBLEM — Z12.11 COLON CANCER SCREENING: Status: ACTIVE | Noted: 2023-09-07

## 2023-09-12 ENCOUNTER — LAB (OUTPATIENT)
Dept: LAB | Facility: HOSPITAL | Age: 63
End: 2023-09-12
Payer: COMMERCIAL

## 2023-09-12 ENCOUNTER — OFFICE VISIT (OUTPATIENT)
Dept: ONCOLOGY | Facility: CLINIC | Age: 63
End: 2023-09-12
Payer: COMMERCIAL

## 2023-09-12 VITALS
HEIGHT: 67 IN | RESPIRATION RATE: 16 BRPM | SYSTOLIC BLOOD PRESSURE: 140 MMHG | BODY MASS INDEX: 29.98 KG/M2 | WEIGHT: 191 LBS | OXYGEN SATURATION: 98 % | DIASTOLIC BLOOD PRESSURE: 88 MMHG | HEART RATE: 92 BPM | TEMPERATURE: 98 F

## 2023-09-12 DIAGNOSIS — C50.212 MALIGNANT NEOPLASM OF UPPER-INNER QUADRANT OF LEFT BREAST IN FEMALE, ESTROGEN RECEPTOR POSITIVE: Primary | ICD-10-CM

## 2023-09-12 DIAGNOSIS — Z17.0 MALIGNANT NEOPLASM OF UPPER-INNER QUADRANT OF LEFT BREAST IN FEMALE, ESTROGEN RECEPTOR POSITIVE: ICD-10-CM

## 2023-09-12 DIAGNOSIS — C50.212 MALIGNANT NEOPLASM OF UPPER-INNER QUADRANT OF LEFT BREAST IN FEMALE, ESTROGEN RECEPTOR POSITIVE: ICD-10-CM

## 2023-09-12 DIAGNOSIS — Z17.0 MALIGNANT NEOPLASM OF UPPER-INNER QUADRANT OF LEFT BREAST IN FEMALE, ESTROGEN RECEPTOR POSITIVE: Primary | ICD-10-CM

## 2023-09-12 LAB
ALBUMIN SERPL-MCNC: 4.4 G/DL (ref 3.5–5.2)
ALBUMIN/GLOB SERPL: 1.9 G/DL
ALP SERPL-CCNC: 122 U/L (ref 39–117)
ALT SERPL W P-5'-P-CCNC: 39 U/L (ref 1–33)
ANION GAP SERPL CALCULATED.3IONS-SCNC: 16.5 MMOL/L (ref 5–15)
AST SERPL-CCNC: 24 U/L (ref 1–32)
BASOPHILS # BLD AUTO: 0.03 10*3/MM3 (ref 0–0.2)
BASOPHILS NFR BLD AUTO: 0.4 % (ref 0–1.5)
BILIRUB SERPL-MCNC: 0.4 MG/DL (ref 0–1.2)
BUN SERPL-MCNC: 12 MG/DL (ref 8–23)
BUN/CREAT SERPL: 15 (ref 7–25)
CALCIUM SPEC-SCNC: 10.1 MG/DL (ref 8.6–10.5)
CHLORIDE SERPL-SCNC: 102 MMOL/L (ref 98–107)
CO2 SERPL-SCNC: 25.5 MMOL/L (ref 22–29)
CREAT SERPL-MCNC: 0.8 MG/DL (ref 0.6–1.1)
DEPRECATED RDW RBC AUTO: 43.7 FL (ref 37–54)
EGFRCR SERPLBLD CKD-EPI 2021: 83.9 ML/MIN/1.73
EOSINOPHIL # BLD AUTO: 0.69 10*3/MM3 (ref 0–0.4)
EOSINOPHIL NFR BLD AUTO: 9.3 % (ref 0.3–6.2)
ERYTHROCYTE [DISTWIDTH] IN BLOOD BY AUTOMATED COUNT: 13 % (ref 12.3–15.4)
GLOBULIN UR ELPH-MCNC: 2.3 GM/DL
GLUCOSE SERPL-MCNC: 131 MG/DL (ref 65–99)
HCT VFR BLD AUTO: 45.8 % (ref 34–46.6)
HGB BLD-MCNC: 15 G/DL (ref 12–15.9)
IMM GRANULOCYTES # BLD AUTO: 0.02 10*3/MM3 (ref 0–0.05)
IMM GRANULOCYTES NFR BLD AUTO: 0.3 % (ref 0–0.5)
LYMPHOCYTES # BLD AUTO: 1.38 10*3/MM3 (ref 0.7–3.1)
LYMPHOCYTES NFR BLD AUTO: 18.7 % (ref 19.6–45.3)
MCH RBC QN AUTO: 30.2 PG (ref 26.6–33)
MCHC RBC AUTO-ENTMCNC: 32.8 G/DL (ref 31.5–35.7)
MCV RBC AUTO: 92.2 FL (ref 79–97)
MONOCYTES # BLD AUTO: 0.3 10*3/MM3 (ref 0.1–0.9)
MONOCYTES NFR BLD AUTO: 4.1 % (ref 5–12)
NEUTROPHILS NFR BLD AUTO: 4.97 10*3/MM3 (ref 1.7–7)
NEUTROPHILS NFR BLD AUTO: 67.2 % (ref 42.7–76)
NRBC BLD AUTO-RTO: 0 /100 WBC (ref 0–0.2)
PLATELET # BLD AUTO: 276 10*3/MM3 (ref 140–450)
PMV BLD AUTO: 10 FL (ref 6–12)
POTASSIUM SERPL-SCNC: 4 MMOL/L (ref 3.5–5.2)
PROT SERPL-MCNC: 6.7 G/DL (ref 6–8.5)
RBC # BLD AUTO: 4.97 10*6/MM3 (ref 3.77–5.28)
SODIUM SERPL-SCNC: 144 MMOL/L (ref 136–145)
WBC NRBC COR # BLD: 7.39 10*3/MM3 (ref 3.4–10.8)

## 2023-09-12 PROCEDURE — 85025 COMPLETE CBC W/AUTO DIFF WBC: CPT

## 2023-09-12 PROCEDURE — 99214 OFFICE O/P EST MOD 30 MIN: CPT | Performed by: INTERNAL MEDICINE

## 2023-09-12 PROCEDURE — 80053 COMPREHEN METABOLIC PANEL: CPT

## 2023-09-12 PROCEDURE — 36415 COLL VENOUS BLD VENIPUNCTURE: CPT

## 2023-09-12 NOTE — PROGRESS NOTES
Subjective     REASON FOR follow-up:     1.  PT2N0, anatomic stage IIa, prognostic stage Ia invasive ductal carcinoma of the left breast,  intermediate grade ER positive, PA positive HER-2/juan negative s/p left partial mastectomy with sentinel lymph node biopsy with oncoplastic closure and right reduction for symmetry on 2022.    Oncotype DX testin, no need for chemotherapy   Femara 2022, started Femara will start towards the end of radiation    2.  S/p right nephrectomy for renal cell carcinoma at age 31        History of Present Illness   Patient is a 62-year-old female with recently diagnosed stage IIa left breast cancer ER/PA positive HER-2/juan negative.  She underwent surgery as of 2022.      Patient's Oncotype DX is back. It is 14. She does not require any chemotherapy. She will require to start on endocrine therapy and being postmenopausal will start her on aromatase inhibitor. She also will require a DEXA scan to evaluate for osteopenia. She is healed up from surgery. She is going to start radiation. Patient is going to receive a total dose of 3990 cGy in 15 fractions followed by boost to the tumor bed 1000 cGy in 5 fractions.    Interval history: Patient is tolerating Femara well.  I reviewed the CT scan from  which shows hepatic steatosis.  Encouraged her on proper nutrition and decrease fried foods and fatty foods.  She does not have much arthralgias.  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  Encouraged her to focus on exercise.    She is to take calcium and vitamin D supplements.  Oncologic history:  patient is a 61-year-old female who is a  for [employer]. She recently back in  noticed a mass in the left breast upper inner quadrant. She was playing with a cat and noticed some pain in the breast at which time she felt the breast and felt this mass. She has had  history of nipple discharge on and off for years in the left breast but that has been very minimal. There is no bloody discharge. She has not had mammogram for years, at least 25 years according to her. So when she felt the mass she went to her primary care physician Dr. Zarina Tineo who ordered a diagnostic mammogram and found in abnormality in the left breast at 10 o'clock position which was 2.6 x 2.5 x 2.3 cm. Subsequently she underwent a biopsy which was consistent with invasive ductal carcinoma ER/AZ positive HER-2/juan negative.    She does have a family history of malignancy with her grandmother having: And breast cancer on her maternal side. Her maternal great-grandmother had breast cancer. Her mother had thyroid cancer. Patient has had renal cell cancer at age 31 and had to undergo left nephrectomy. She also had a thyroid nodule for which she was seen at [facility] by physician. He had placed her on Synthroid and she had a very severe allergic reaction to Synthroid with swelling and he discontinued that. Patient was to be followed in a years time and has not gone.. But she has not had any issues with that    More recently she lost her  3 weeks ago as he was dealing with colon cancer. He was 70 years of age. He did not want any treatment. She had to take care of of him for the last 16 months and if no her symptoms. 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    Details are as follows    November 10, 2021: Bilateral diagnostic mammogram and ultrasound  FINDINGS: Bilateral digital CC and MLO mammographic and Tomosynthesis  images were obtained. No prior examination is available for comparison.  Scattered fibroglandular densities are seen throughout both breasts. In  the posterior one-third upper inner quadrant of the left breast at the  site of palpable concern corresponding to the overlying triangular skin  marker, there is an irregular  mass that measures on the order of 2.6 x  2.5 x 2.3 cm. Internal coarse calcifications and areas of fat necrosis  are noted. I see no other dominant masses in either breast. No areas of  architectural distortion are appreciated. There is no evidence for skin  thickening, nipple retraction or axillary adenopathy.     ULTRASOUND: Targeted sonographic evaluation of the left breast was  performed through the area of palpable concern which corresponds to the  10-o'clock position on the order of 5 cm from the nipple. At this  location, there is an irregular hypoechoic mass with internal  calcifications. The mass measures 2.4 x 1.9 x 1.4 cm. No detectable  internal vascularity is appreciated. Posterior acoustic shadowing is  noted.     IMPRESSION:  1. There is an irregular mass in the left breast at the 10-o'clock  position corresponding to the site of palpable concern. Sonographically,  the mass measures on the order of 2.4 cm in greatest dimension.  Correlation with an ultrasound-guided left breast biopsy is recommended.  2. There are no findings suspicious for malignancy in the right breast.     November 9, 2021: Ultrasound-guided left breast biopsy showed    Final Diagnosis   1. Left Breast, 10 O'clock, 5 cm from Nipple, Ultrasound-Guided Biopsies for a Mass: INVASIVE MAMMARY                CARCINOMA, NO SPECIAL TYPE (INVASIVE DUCTAL CARCINOMA).               A. Histologic grade: [place] histologic score II (tubules = 3, nuclei = 2, mitosis = 1).               B. Largest contiguous invasive focus measures 10 mm in a core.               C. No definitive in situ component identified.                       D. No lymphovascular nor perineural invasion identified.     Estrogen receptor: %  Progesterone receptor: %  HER-2/juan: 1+, negative  Ki-67: 8%    December 28, 2021: MRI of the breast    FINDINGS:Scattered fibroglandular tissue is seen throughout both  breasts. Minimal background parenchymal enhancement  of both breasts is  noted.     Within the posterior one-third upper inner quadrant of the left breast  centered at the 10 o'clock position on the order of 8 cm from nipple  there is an irregular enhancing mass that measures on the order of 2.5  cm in anterior to posterior dimension, 2.8 cm in the mediolateral  dimension and 2.1 cm in the superior-inferior dimension. A signal void  is seen within the mass that corresponds to a bowtie-shaped metallic  clip. This represents the biopsy-proven site of malignancy.     No other areas of abnormal enhancement or morphology are seen within the  left breast. I see no evidence for abnormal left breast skin, nipple or  chest wall enhancement.  There is no evidence for left axillary or  internal mammary chain adenopathy.     There are no areas of abnormal enhancement or morphology in the right  breast. I see no evidence for abnormal right breast skin, nipple or  chest wall enhancement and there is no evidence for right axillary or  internal mammary chain adenopathy.     IMPRESSION:  1. Biopsy-proven malignancy in the left breast at the 10 o'clock  position in the posterior one-third that measures on the order of 2.8 cm  in greatest dimension with a centrally located bowtie shaped metallic  clip. No other suspicious findings are seen within the left breast and  there is no evidence for left axillary or internal mammary chain  adenopathy.  2. There are no findings suspicious for malignancy in the right breast.     BI-RADS category 6: Known malignancy.     This report was finalized on 12/28/2021 7:24 AM by Dr. Sixto Ramos M.D.     Patient has been scheduled for further recommendations. She has appointment with Dr. Beth Delong at 11 AM today.    January 17, 2022: Patient is s/p surgery left partial mastectomy with sentinel lymph node biopsy      Synoptic Checklist  INVASIVE CARCINOMA OF THE BREAST: Resection (INVASIVE CARCINOMA OF THE BREAST: COMPLETE EXCISION - All  Specimens)  Specimen Laterality Left  .  TUMOR  Tumor Site Upper outer quadrant  Clock position  10 o'clock  Tumor Site Distance from nipple (Centimeters): 5 cm  Histologic Type Invasive carcinoma of no special type (ductal)  Histologic Grade ([place] Histologic Score)  Glandular (Acinar) / Tubular Differentiation Score 3  Nuclear Pleomorphism Score 2  Mitotic Rate Score 1  Overall Grade Grade 2 (scores of 6 or 7)  Tumor Size Greatest dimension of largest invasive focus (Millimeters): 30 mm  Tumor Focality Single focus of invasive carcinoma  Ductal Carcinoma In Situ (DCIS) Not identified  Lobular Carcinoma In Situ (LCIS) Not identified  Tumor Extent  Lymphovascular Invasion Not identified  Dermal Lymphovascular Invasion Not identified  Microcalcifications Present in invasive carcinoma  Present in non-neoplastic tissue  Treatment Effect in the Breast No known presurgical therapy  .  MARGINS  Margin Status for Invasive Carcinoma All margins negative for invasive carcinoma  Distance from Invasive Carcinoma to Closest Margin 12.8 mm  Closest Margin(s) to Invasive Carcinoma Inferior  Distance from Invasive Carcinoma to Posterior Margin 23 mm  Distance from Invasive Carcinoma to Superior Margin 13 mm  Distance from Invasive Carcinoma to Medial Margin  Comment  The patient's corresponding left breast core biopsy material is pulled and reviewed for comparison ([id number]). The  tumors appear similar; no discordance is noted.  Selected slides from this case are shared in internal consultation with Bam Campbell and  who concur.  [facility]/kds  .  REGIONAL LYMPH NODES  Regional Lymph Node Status All regional lymph nodes negative for tumor  Total Number of Lymph Nodes Examined (sentinel and non-sentinel) 1  Number of [place] Nodes Examined 1  .  PATHOLOGIC STAGE CLASSIFICATION (pTNM, AJCC 8th Edition)    pT Category pT2  Regional Lymph Nodes Modifier (sn): [place] node(s) evaluated.  pN Category pN0    ER %  ME  %  HER-2/juan 1±  Ki-67 8%    Oncotype DX 14. No plans for chemo    We will start Femara    Past Medical History:   Diagnosis Date    Anesthesia complication     SLOW TO WAKE UP    Arthritis     Breast cancer 2021    Invasive ductal carcinoma of left breast     GERD (gastroesophageal reflux disease)     history    History of kidney cancer     S/P right nephrectomy in 1991    Hypertension     Tumor, thyroid     Type 2 diabetes mellitus         Past Surgical History:   Procedure Laterality Date    APPENDECTOMY      BACK SURGERY  1990    BREAST BIOPSY Right     BREAST LUMPECTOMY WITH SENTINEL NODE BIOPSY Left 01/17/2022    Procedure: Left ultrasound-guided partial mastectomy and sentinel lymph node biopsy;  Surgeon: Beth Delong MD;  Location: [facility];  Service: General;  Laterality: Left;    BREAST RECONSTRUCTION Bilateral 01/17/2022    Procedure: Right breast reduction/mastopexy, left breast oncoplastic closure, bilateral chest liposuction;  Surgeon: Simon Castro MD;  Location: [facility];  Service: Plastics;  Laterality: Bilateral;    BREAST RECONSTRUCTION Bilateral 10/26/2022    Procedure: BREAST RECONSTRUCTION REVISION with skin tailoring, fat graft and induration resection.;  Surgeon: Simon Castro MD;  Location: [facility];  Service: Plastics;  Laterality: Bilateral;    COLONOSCOPY      FAT GRAFTING Bilateral 10/26/2022    Procedure: FAT GRAFTING;  Surgeon: Simon Castro MD;  Location: [facility];  Service: Plastics;  Laterality: Bilateral;    HYSTERECTOMY  1992    KNEE SURGERY Right 2001    RECONSTRUCTION    KNEE SURGERY Left 2003    RECONSTRUCTION    LASIK Bilateral     NEPHRECTOMY Right 1991    REDUCTION MAMMAPLASTY  2008    THYROID BIOPSY Bilateral 2010    TONSILLECTOMY          Current Outpatient Medications on File Prior to Visit   Medication Sig Dispense Refill    amLODIPine (NORVASC) 10 MG tablet Take 1 tablet by mouth Daily. 90 tablet 0     Blood Glucose Monitoring Suppl (OneTouch Verio Reflect) w/Device kit See Admin Instructions.      glucose blood test strip 1 each by Other route Daily. 25 each 2    Lancets (freestyle) lancets 1 each by Other route Daily. 100 each 4    letrozole (FEMARA) 2.5 MG tablet Take 1 tablet by mouth Daily. 90 tablet 0    metFORMIN ER (GLUCOPHAGE-XR) 500 MG 24 hr tablet Take 1 tablet by mouth 2 (Two) Times a Day With Meals. 360 tablet 0    Ozempic, 1 MG/DOSE, 4 MG/3ML solution pen-injector INJECT 1 MG UNDER THE SKIN INTO THE APPROPRIATE AREA AS DIRECTED WEEKLY 3 mL 12    valsartan (Diovan) 80 MG tablet Take 1 tablet by mouth Daily. 30 tablet 2     No current facility-administered medications on file prior to visit.        ALLERGIES:    Allergies   Allergen Reactions    Hydrocodone Anaphylaxis     PER PATIENT    Penicillins Itching        Social History     Socioeconomic History    Marital status:      Spouse name: Lucas    Number of children: 0    Highest education level: Bachelor's degree (e.g., BA, AB, BS)   Tobacco Use    Smoking status: Former     Packs/day: 0.00     Years: 0.00     Pack years: 0.00     Types: Cigarettes     Quit date: 1991     Years since quittin.7     Passive exposure: Never    Smokeless tobacco: Never   Vaping Use    Vaping Use: Never used   Substance and Sexual Activity    Alcohol use: Not Currently    Drug use: Never    Sexual activity: Not Currently     Partners: Male     Birth control/protection: Post-menopausal, None        Family History   Problem Relation Age of Onset    Diabetes type II Mother     Hypertension Mother     Thyroid cancer Mother     Breast cancer Maternal Grandmother     Colon cancer Maternal Grandmother     Diabetes Maternal Grandmother     Stroke Maternal Grandmother     Alzheimer's disease Maternal Grandfather     Malig Hyperthermia Neg Hx         Review of Systems   Constitutional:  Positive for fatigue. Negative for appetite change, chills, diaphoresis, fever  and unexpected weight change.   HENT:  Negative for hearing loss, sore throat and trouble swallowing.    Respiratory:  Negative for cough, chest tightness, shortness of breath and wheezing.    Cardiovascular:  Negative for chest pain, palpitations and leg swelling.   Gastrointestinal:  Positive for nausea. Negative for abdominal distention, abdominal pain, constipation, diarrhea and vomiting.   Genitourinary:  Negative for dysuria, frequency, hematuria and urgency.   Musculoskeletal:  Negative for joint swelling.        No muscle weakness.   Skin:  Negative for rash and wound.   Neurological:  Positive for headaches. Negative for seizures, syncope, speech difficulty, weakness and numbness.   Hematological:  Negative for adenopathy. Does not bruise/bleed easily.   Psychiatric/Behavioral:  Positive for sleep disturbance. Negative for behavioral problems, confusion and suicidal ideas.    All other systems reviewed and are negative.I have reviewed and confirmed the accuracy of the ROS as documented by the MA/LPN/RN Marisa King MD      Family history:  Mother had thyroid cancer. Grandmother had colon cancer and breast cancer on the maternal side and great grandmother had breast cancer on the maternal side. Patient has had thyroid nodule which was biopsied 10 years ago and benign. She had renal cell cancer s/p left nephrectomy.    OB/GYN history  Age of menstruation: 13 years  Age of menopause: 50+ years   0 para 0, no miscarriages  Patient was exposed to birth control pills only the posterior of her marriage and took it just for 1 year  Patient did not take any postmenopausal hormone replacement therapy  Patient had hysterectomy 30 years ago.    Social history: She lost her  just recently and is willing upset about that. She works as a  in [employer]. She smoked just a few cigarettes for short very short time when she was in college. She does not drink alcohol. She has no  "kids.    Past medical history:  Hypertension, followed by her primary care  Diabetes mellitus for which she is on Metformin  Thyroid nodule which on biopsy was benign in the past    Objective     Vitals:    09/12/23 1524   BP: 140/88   Pulse: 92   Resp: 16   Temp: 98 °F (36.7 °C)   TempSrc: Temporal   SpO2: 98%   Weight: 86.6 kg (191 lb)   Height: 170.2 cm (67.01\")   PainSc: 0-No pain         9/12/2023     3:22 PM   Current Status   ECOG score 0       Physical Exam      Patient screened positive for depression based on a PHQ-9 score of 0 on 3/7/2023. Follow-up recommendations include: PCP managing depression.      CONSTITUTIONAL:  Vital signs reviewed.  No distress, looks comfortable.  EYES:  Conjunctivae and lids unremarkable.  PERRLA  EARS,NOSE,MOUTH,THROAT:  Ears and nose appear unremarkable.  Lips, teeth, gums appear unremarkable.  RESPIRATORY:  Normal respiratory effort.  Lungs clear to auscultation bilaterally.  BREAST: Right breast: No skin changes, no evidence of breast mass, no nipple discharge, no evidence of any right axillary adenopathy or right supraclavicular adenopathy  Left breast: No evidence of any skin changes, no evidence of any left breast mass and no evidence of left nipple discharge as well as no left axillary adenopathy or left supraclavicular adenopathy.  CARDIOVASCULAR:  Normal S1, S2.  No murmurs rubs or gallops.  No significant lower extremity edema.  GASTROINTESTINAL: Abdomen appears unremarkable.  Nontender.  No hepatomegaly.  No splenomegaly.  LYMPHATIC:  No cervical, supraclavicular, axillary lymphadenopathy.  SKIN:  Warm.  No rashes.  PSYCHIATRIC:  Normal judgment and insight.  Normal mood and affect.    I have reexamined the patient and the results are consistent with the previously documented exam. Marisa King MD   RECENT LABS:  Hematology WBC   Date Value Ref Range Status   09/12/2023 7.39 3.40 - 10.80 10*3/mm3 Final   07/17/2023 5.7 3.4 - 10.8 x10E3/uL Final     RBC   Date " Value Ref Range Status   09/12/2023 4.97 3.77 - 5.28 10*6/mm3 Final   07/17/2023 4.80 3.77 - 5.28 x10E6/uL Final     Hemoglobin   Date Value Ref Range Status   09/12/2023 15.0 12.0 - 15.9 g/dL Final     Hematocrit   Date Value Ref Range Status   09/12/2023 45.8 34.0 - 46.6 % Final     Platelets   Date Value Ref Range Status   09/12/2023 276 140 - 450 10*3/mm3 Final          Assessment & Plan     1.  T2 N0 M0, anatomic stage IIa , prognostic stage Ib invasive ductal carcinoma of the left breast at 10 o'clock position, grade 2, ER 91 x 100%, MT 91/100%, HER-2/juan 1+, Ki-67 8%.  No evidence of lymphovascular space invasion or perineural invasion  Patient has not had screening mammogram for 25 years  She felt a mass in September 2021 when she was holding the cat and noticed pain in the left breast upper inner quadrant and then she felt the mass, she was seen by her primary care physician  Diagnostic mammogram, November 10, 2021: Bilateral showed a 2.5 x 2.6 x 2.3 cm mass in the posterior one third upper inner quadrant of the left breast.  Internal coarse calcification and areas of fat necrosis were noted.  No other dominant no masses were seen.  There was no skin thickening, nipple retraction or axillary adenopathy  Ultrasound showed a 2.4 x 1.9 x 1.4 cm mass in the 10 o'clock position 5 cm from the nipple  December 2, 2021 ultrasound-guided left breast biopsy showed invasive ductal carcinoma, grade 2 with a [place] score of 6, %, %, HER-2/juan 1+, Ki-67 8%  December 23, 2021: Patient underwent MRI of the breast which showed a 2.5 x 2.8 x 2.1 cm irregular enhancing mass at 10 o'clock position, 8 cm from the nipple.  There was no evidence of any left axillary or internal mammary chain lymphadenopathy.  Right breast was negative  I had a lengthy discussion with the patient about the nature of the breast cancer that was present.  We told her that was the most common type of breast cancer and given that it was  ER/CA positive HER-2/juan negative without any lymphovascular or perineural invasion and Ki-67 of 8% it does not appear to be a highly aggressive disease.  He states that the lump did not increase in size since she first noticed it.  We then discussed about options of therapy of surgery upfront, followed by evaluation for Oncotype DX testing to see if she will be eligible for any chemotherapy and also will be eligible for endocrine therapy.  Patient is to see Dr. Beth Delong  today and we will await her input, I did discuss that likely the best option is for consideration of surgery upfront and will await to hear from Dr. Delong  However patient has family history of breast cancer in her maternal grandmother and great-grandmother and colon cancer in her maternal grandmother, thyroid cancer in her mother and personal history of renal cell cancer.  We then discussed about obtaining INVITAE genetic test today and has been ordered in our office for the 9 breast cancer gene with reflux to be 47 gene panel.  I told her surgical options will be discussed by Dr. Delong.  January 17, 2022 Left breast lumpectomy, grade 2 invasive ductal carcinoma measuring 30 mm with perineural invasion and microcalcifications identified adjacent to the clip and biopsy site change.  Focal minimal associated atypical ductal hyperplasia but no definite in situ carcinoma is identified all margins are free of tumor closest margin being 2.8 mm.  Incidental fibroadenoma 2.6 mm maximally with focal fat necrosis.  0 of 1 sentinel lymph node negative.  Right breast mammoplasty was benign  February 25, 2022: Patient already seen by radiation oncology with plans to start radiation.  Oncotype DX testing shows score of 14, her benefit from chemo is less than 1%  February 2022: Started Femara   September 12, 2023: Continue Femara tolerating very well.      2.  Family history of malignancy:  However patient has family history of breast cancer in her  maternal grandmother and great-grandmother and colon cancer in her maternal grandmother, thyroid cancer in her mother and personal history of renal cell cancer.  We then discussed about obtaining INVITAE genetic test today and has been ordered in our office for the 9 breast cancer gene with reflux to be 47 gene panel.  INVITAE genetic test negative for 47 genes    3.  Hypertension currently on lisinopril followed by primary care physician.  Today mildly elevated blood pressure  Stable    4.  History of diabetes mellitus currently on Metformin    5.  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  History of renal cell cancer s/p left nephrectomy without evidence of recurrence and at age 31    7.  Osteopenia: Reviewed the bone density which is within normal limits    Plan  Continue Femara  Continue calcium and vitamin D supplements  Reviewed mammogram from January 2023 which is negative  Continue exercise, proper nutrition  Follow-up in 6 months with labs at which time we can order DEXA scan which will be due May 2024  Monitoring high risk medication    MD Dr. Zarina Hernandez Dr., Dr.

## 2023-09-13 DIAGNOSIS — R80.9 PROTEINURIA, UNSPECIFIED TYPE: ICD-10-CM

## 2023-09-13 DIAGNOSIS — I10 PRIMARY HYPERTENSION: ICD-10-CM

## 2023-09-14 RX ORDER — AMLODIPINE BESYLATE 10 MG/1
TABLET ORAL
Qty: 90 TABLET | Refills: 0 | Status: SHIPPED | OUTPATIENT
Start: 2023-09-14

## 2023-09-14 RX ORDER — VALSARTAN 80 MG/1
TABLET ORAL
Qty: 30 TABLET | Refills: 3 | Status: SHIPPED | OUTPATIENT
Start: 2023-09-14

## 2023-09-18 ENCOUNTER — ANESTHESIA EVENT (OUTPATIENT)
Dept: PERIOP | Facility: HOSPITAL | Age: 63
End: 2023-09-18
Payer: COMMERCIAL

## 2023-09-18 NOTE — NURSING NOTE
Pt has had L breast surgery and reconstruction on the L side and does not wish to have BP or venipuncture on L side

## 2023-09-19 ENCOUNTER — HOSPITAL ENCOUNTER (OUTPATIENT)
Facility: HOSPITAL | Age: 63
Setting detail: HOSPITAL OUTPATIENT SURGERY
Discharge: HOME | End: 2023-09-19
Attending: SURGERY | Admitting: SURGERY
Payer: COMMERCIAL

## 2023-09-19 ENCOUNTER — ANESTHESIA (OUTPATIENT)
Dept: PERIOP | Facility: HOSPITAL | Age: 63
End: 2023-09-19
Payer: COMMERCIAL

## 2023-09-19 VITALS
SYSTOLIC BLOOD PRESSURE: 135 MMHG | OXYGEN SATURATION: 97 % | DIASTOLIC BLOOD PRESSURE: 88 MMHG | TEMPERATURE: 98.5 F | BODY MASS INDEX: 29.78 KG/M2 | WEIGHT: 190.2 LBS | HEART RATE: 78 BPM | RESPIRATION RATE: 20 BRPM

## 2023-09-19 DIAGNOSIS — Z12.11 COLON CANCER SCREENING: ICD-10-CM

## 2023-09-19 LAB — GLUCOSE BLDC GLUCOMTR-MCNC: 173 MG/DL (ref 70–130)

## 2023-09-19 PROCEDURE — 82948 REAGENT STRIP/BLOOD GLUCOSE: CPT

## 2023-09-19 PROCEDURE — 25010000002 PROPOFOL 200 MG/20ML EMULSION: Performed by: NURSE ANESTHETIST, CERTIFIED REGISTERED

## 2023-09-19 PROCEDURE — 88305 TISSUE EXAM BY PATHOLOGIST: CPT | Performed by: SURGERY

## 2023-09-19 PROCEDURE — 45385 COLONOSCOPY W/LESION REMOVAL: CPT | Performed by: SURGERY

## 2023-09-19 RX ORDER — SODIUM CHLORIDE, SODIUM LACTATE, POTASSIUM CHLORIDE, CALCIUM CHLORIDE 600; 310; 30; 20 MG/100ML; MG/100ML; MG/100ML; MG/100ML
9 INJECTION, SOLUTION INTRAVENOUS CONTINUOUS PRN
Status: DISCONTINUED | OUTPATIENT
Start: 2023-09-19 | End: 2023-09-19 | Stop reason: HOSPADM

## 2023-09-19 RX ORDER — PROPOFOL 10 MG/ML
INJECTION, EMULSION INTRAVENOUS AS NEEDED
Status: DISCONTINUED | OUTPATIENT
Start: 2023-09-19 | End: 2023-09-19 | Stop reason: SURG

## 2023-09-19 RX ORDER — SODIUM CHLORIDE 0.9 % (FLUSH) 0.9 %
10 SYRINGE (ML) INJECTION AS NEEDED
Status: DISCONTINUED | OUTPATIENT
Start: 2023-09-19 | End: 2023-09-19 | Stop reason: HOSPADM

## 2023-09-19 RX ORDER — SODIUM CHLORIDE 9 MG/ML
40 INJECTION, SOLUTION INTRAVENOUS AS NEEDED
Status: DISCONTINUED | OUTPATIENT
Start: 2023-09-19 | End: 2023-09-19 | Stop reason: HOSPADM

## 2023-09-19 RX ORDER — LIDOCAINE HYDROCHLORIDE 20 MG/ML
INJECTION, SOLUTION EPIDURAL; INFILTRATION; INTRACAUDAL; PERINEURAL AS NEEDED
Status: DISCONTINUED | OUTPATIENT
Start: 2023-09-19 | End: 2023-09-19 | Stop reason: SURG

## 2023-09-19 RX ORDER — MAGNESIUM HYDROXIDE 1200 MG/15ML
LIQUID ORAL AS NEEDED
Status: DISCONTINUED | OUTPATIENT
Start: 2023-09-19 | End: 2023-09-19 | Stop reason: HOSPADM

## 2023-09-19 RX ORDER — ONDANSETRON 2 MG/ML
4 INJECTION INTRAMUSCULAR; INTRAVENOUS ONCE AS NEEDED
Status: DISCONTINUED | OUTPATIENT
Start: 2023-09-19 | End: 2023-09-19 | Stop reason: HOSPADM

## 2023-09-19 RX ORDER — SODIUM CHLORIDE 0.9 % (FLUSH) 0.9 %
10 SYRINGE (ML) INJECTION EVERY 12 HOURS SCHEDULED
Status: DISCONTINUED | OUTPATIENT
Start: 2023-09-19 | End: 2023-09-19 | Stop reason: HOSPADM

## 2023-09-19 RX ADMIN — PROPOFOL INJECTABLE EMULSION 400 MG: 10 INJECTION, EMULSION INTRAVENOUS at 08:56

## 2023-09-19 RX ADMIN — LIDOCAINE HYDROCHLORIDE 100 MG: 20 INJECTION, SOLUTION EPIDURAL; INFILTRATION; INTRACAUDAL; PERINEURAL at 08:56

## 2023-09-19 RX ADMIN — SODIUM CHLORIDE, POTASSIUM CHLORIDE, SODIUM LACTATE AND CALCIUM CHLORIDE 9 ML/HR: 600; 310; 30; 20 INJECTION, SOLUTION INTRAVENOUS at 08:35

## 2023-09-19 NOTE — OP NOTE
Colonoscopy Procedure Note      Pre-operative Diagnosis: Colon cancer screening    Post-operative Diagnosis: Right colon polyp, sigmoid colon polyp, diverticulosis    Procedure: Colonoscopy with polypectomy using hot snare    Surgeon: Jessica    Anesthetic: Jose Miguel Sanchez CRNA    Estimated Blood Loss: Minimal    Complications: None    Indications: Colon cancer screening    Findings/Treatments:   Right colon polyp-removed with hot snare  Sigmoid colon polyp-with hot snare       Scope Withdrawal Time:  6 minutes      Recommendations: Await pathology      Procedure Details     After discussing the benefits and risks of the procedure with the patient, not limited to but including:  Bleeding, infection, perforation, aspiration; informed consent was signed.  The patient was taken into the endoscopy room at [facility] and placed in the left lateral decubitus position.  MAC anesthesia was given with appropriate cardiopulmonary monitoring.  A rectal exam was performed.  Sphincter tone was normal.  The colonoscope was then inserted and carefully advanced to the cecum while visualizing the mucosa.  The cecum was identified by the ileocecal valve and the orifice of the appendix.  Once in the cecum the scope was withdrawn to the right colon where a large polyp was removed hot snare.  The scope was then slowly withdrawn while carefully evaluating the mucosa and deflating air.  There was a large sigmoid colon polyp with hot snare.  There was also diverticulosis throughout the colon.  Once in the rectum the scope was retroflexed and straightened and withdrawn.  CD was then taken to the recovery area in stable postoperative condition having tolerated her procedure well.    Sadia Lopez DO

## 2023-09-19 NOTE — ANESTHESIA POSTPROCEDURE EVALUATION
Patient: Daysi Lugo    Procedure Summary       Date: 09/19/23 Room / Location: [facility] ENDOSCOPY 1 /  [facility] OR    Anesthesia Start: 0852 Anesthesia Stop: 0916    Procedure: COLONOSCOPY WITH POLYPECTOMY Diagnosis:       Colon cancer screening      (Colon cancer screening [Z12.11])    Surgeons: Sadia Lopez DO Provider: Jose Miguel Sutton CRNA    Anesthesia Type: MAC ASA Status: 2            Anesthesia Type: MAC    Vitals  Vitals Value Taken Time   /88 09/19/23 0945   Temp     Pulse 78 09/19/23 0950   Resp 20 09/19/23 0945   SpO2 97 % 09/19/23 0950           Post Anesthesia Care and Evaluation    Patient location during evaluation: bedside  Patient participation: complete - patient participated  Level of consciousness: awake and alert  Pain score: 0  Pain management: adequate    Airway patency: patent  Anesthetic complications: No anesthetic complications  PONV Status: none  Cardiovascular status: acceptable  Respiratory status: acceptable  Hydration status: acceptable  No anesthesia care post op

## 2023-09-19 NOTE — ANESTHESIA PREPROCEDURE EVALUATION
Anesthesia Evaluation     Patient summary reviewed and Nursing notes reviewed   history of anesthetic complications:  prolonged sedation  NPO Solid Status: > 8 hours  NPO Liquid Status: > 8 hours           Airway   Mallampati: III  TM distance: >3 FB  Neck ROM: full  No difficulty expected  Dental - normal exam     Pulmonary - normal exam    breath sounds clear to auscultation  (+) a smoker (quit over 30 years ago) Former Abstained day of surgery,  (-) shortness of breath, recent URI  Cardiovascular   Exercise tolerance: good (4-7 METS)    Rhythm: regular  Rate: normal    (+) hypertension 2 medications or greater  (-) angina, DVT    ROS comment: BP meds taken today    Neuro/Psych  (+) psychiatric history Depression  (-) seizures, CVA  GI/Hepatic/Renal/Endo    (+) GERD well controlled, renal disease (cancer), diabetes mellitus type 2 well controlled, thyroid problem (nodules)     Musculoskeletal     (+) arthralgias (sana knee and both ankles)  (-) back pain, neck pain, neck stiffness  Abdominal   (+) obese    Abdomen: soft.   Substance History   (-) alcohol use, drug use     OB/GYN negative ob/gyn ROS         Other   arthritis (sana knees left ankle),   history of cancer                    Anesthesia Plan    ASA 2     MAC   total IV anesthesia  intravenous induction     Anesthetic plan, risks, benefits, and alternatives have been provided, discussed and informed consent has been obtained with: patient and spouse/significant other.    Use of blood products discussed with patient and spouse/significant other  Consented to blood products.    Plan discussed with CRNA.      CODE STATUS:

## 2023-09-19 NOTE — H&P
General Surgery      Patient Care Team:  Zarina Tineo DO as PCP - General (Family Medicine)  Marisa King MD as Consulting Physician (Hematology and Oncology)  Simon Castro MD as Consulting Physician (Plastic Surgery)  Beth Delong MD as Consulting Physician (Breast Surgery)  Lila Ann MD as Consulting Physician (Radiation Oncology)  Zarina Tineo DO as Referring Physician (Family Medicine)    CHIEF COMPLAINT: Colon cancer screening    HISTORY OF PRESENT ILLNESS:    This very pleasant 63-year-old female is here for colonoscopy.  Her last scope was 30 years ago.  She is unsure why she had a colonoscopy 30 years ago but says she believes polyps were removed.  She does have a family history of colon cancer.  She has no complaints.      Past Medical History:   Diagnosis Date    Anesthesia complication     SLOW TO WAKE UP    Arthritis     Breast cancer 2021    Invasive ductal carcinoma of left breast     GERD (gastroesophageal reflux disease)     history    History of kidney cancer     S/P right nephrectomy in 1991    Hypertension     Tumor, thyroid     Type 2 diabetes mellitus      Past Surgical History:   Procedure Laterality Date    APPENDECTOMY      BACK SURGERY  1990    BREAST BIOPSY Right     BREAST LUMPECTOMY WITH SENTINEL NODE BIOPSY Left 01/17/2022    Procedure: Left ultrasound-guided partial mastectomy and sentinel lymph node biopsy;  Surgeon: Beth Delong MD;  Location: [facility];  Service: General;  Laterality: Left;    BREAST RECONSTRUCTION Bilateral 01/17/2022    Procedure: Right breast reduction/mastopexy, left breast oncoplastic closure, bilateral chest liposuction;  Surgeon: Simon Castro MD;  Location: [facility];  Service: Plastics;  Laterality: Bilateral;    BREAST RECONSTRUCTION Bilateral 10/26/2022    Procedure: BREAST RECONSTRUCTION REVISION with skin tailoring, fat graft and induration resection.;  Surgeon: Simon Castro  MD;  Location: [facility] MAIN OR;  Service: Plastics;  Laterality: Bilateral;    COLONOSCOPY      FAT GRAFTING Bilateral 10/26/2022    Procedure: FAT GRAFTING;  Surgeon: Simon Castro MD;  Location: [facility] MAIN OR;  Service: Plastics;  Laterality: Bilateral;    HYSTERECTOMY  1992    KNEE SURGERY Right 2001    RECONSTRUCTION    KNEE SURGERY Left 2003    RECONSTRUCTION    LASIK Bilateral     NEPHRECTOMY Right 1991    REDUCTION MAMMAPLASTY  2008    THYROID BIOPSY Bilateral 2010    TONSILLECTOMY       Family History   Problem Relation Age of Onset    Diabetes type II Mother     Hypertension Mother     Thyroid cancer Mother     Breast cancer Maternal Grandmother     Colon cancer Maternal Grandmother     Diabetes Maternal Grandmother     Stroke Maternal Grandmother     Alzheimer's disease Maternal Grandfather     Malig Hyperthermia Neg Hx      Social History     Tobacco Use    Smoking status: Former     Packs/day: 0.00     Years: 0.00     Pack years: 0.00     Types: Cigarettes     Quit date: 1991     Years since quittin.7     Passive exposure: Never    Smokeless tobacco: Never   Vaping Use    Vaping Use: Never used   Substance Use Topics    Alcohol use: Not Currently    Drug use: Never     Medications Prior to Admission   Medication Sig Dispense Refill Last Dose    amLODIPine (NORVASC) 10 MG tablet TAKE 1 TABLET DAILY 90 tablet 0 2023    metFORMIN ER (GLUCOPHAGE-XR) 500 MG 24 hr tablet Take 1 tablet by mouth 2 (Two) Times a Day With Meals. 360 tablet 0 2023    valsartan (DIOVAN) 80 MG tablet TAKE 1 TABLET DAILY 30 tablet 3 2023    Blood Glucose Monitoring Suppl (OneTouch Verio Reflect) w/Device kit See Admin Instructions.   Unknown    glucose blood test strip 1 each by Other route Daily. 25 each 2 Unknown    Lancets (freestyle) lancets 1 each by Other route Daily. 100 each 4 Unknown    Ozempic, 1 MG/DOSE, 4 MG/3ML solution pen-injector INJECT 1 MG UNDER THE SKIN INTO THE APPROPRIATE AREA AS  DIRECTED WEEKLY 3 mL 12 9/4/2023     Allergies:  Hydrocodone and Penicillins    REVIEW OF SYSTEMS:  Please see the above history of present illness for pertinent positives and negatives.  The remainder of the patient's systems have been reviewed and are negative.     Vital Signs  Temp:  [98.5 °F (36.9 °C)] 98.5 °F (36.9 °C)    Flowsheet Rows      Flowsheet Row First Filed Value   Admission Height --   Admission Weight 86.3 kg (190 lb 3.2 oz) Documented at 09/19/2023 0821             Physical Exam:  Physical Exam   Constitutional: Patient appears well-developed and well-nourished and in no acute distress   HEENT:   Head: Normocephalic and atraumatic.   Eyes:  Pupils are equal, round, and reactive to light.  Mouth and Throat: Patient has moist mucous membranes. Oropharynx is clear of any erythema or exudate.     Musculoskeletal: Normal posture.  Extremities: No edema.   Neurological: Patient is alert and oriented.  Psychological:   Mood and behavior appropriate.  Skin: Skin is warm and dry.    Debilities/Disabilities Identified: None    Emotional Behavior: Appropriate           Results Review:    I reviewed the patient's new clinical results.  Lab Results (most recent)       None            Imaging Results (Most Recent)       None              ECG/EMG Results (most recent)       None              Assessment & Plan     Colon cancer screening  I discussed with the patient the benefits and risks of performing endoscopy.  Benefits and risks were not limited to but including bleeding, infection, perforation, complications of anesthesia, aspiration.  The patient appeared to understand and is willing to proceed.    Thank you for allowing me to participate in the care of this interesting patient.      Sadia Lopez DO  09/19/23  08:31 EDT

## 2023-09-20 LAB
LAB AP CASE REPORT: NORMAL
PATH REPORT.FINAL DX SPEC: NORMAL
PATH REPORT.GROSS SPEC: NORMAL

## 2023-09-20 RX ORDER — LETROZOLE 2.5 MG/1
TABLET, FILM COATED ORAL
Refills: 3 | OUTPATIENT
Start: 2023-09-20

## 2023-09-21 ENCOUNTER — TELEPHONE (OUTPATIENT)
Dept: ONCOLOGY | Facility: CLINIC | Age: 63
End: 2023-09-21
Payer: COMMERCIAL

## 2023-09-21 RX ORDER — LETROZOLE 2.5 MG/1
2.5 TABLET, FILM COATED ORAL DAILY
Qty: 30 TABLET | Refills: 2 | Status: SHIPPED | OUTPATIENT
Start: 2023-09-21

## 2023-09-21 RX ORDER — LETROZOLE 2.5 MG/1
2.5 TABLET, FILM COATED ORAL DAILY
Qty: 30 TABLET | Refills: 2 | Status: SHIPPED | OUTPATIENT
Start: 2023-09-21 | End: 2023-09-21 | Stop reason: SDUPTHER

## 2023-09-21 NOTE — TELEPHONE ENCOUNTER
Called the patient to let her know that I seen that the pre admissions nurse for her colonoscopy accidentally d/c the femara on 9/18 and that I would be sending in a new prescription. Patient did not answer but a v/m was left.

## 2023-09-21 NOTE — TELEPHONE ENCOUNTER
"  Caller: Daysi Lugo \"CD\"    Relationship: Self    Best call back number: [phone]     What is the best time to reach you: ASAP    Who are you requesting to speak with (clinical staff, provider,  specific staff member): CLINICAL    What was the call regarding: PT WAS TRYING TO GET HER LETROZOLE 2.5 MG REFILLED, BUT PHARMACY SAID IT WAS DENIED.  IT IS NOT SHOWING ON HER MYCHART OR LIST OF MEDS AT ALL.  PLEASE CALL TO ADVISE IF THIS RX CAN BE SENT/REFILLED FOR HER.        "

## 2023-10-02 LAB
LAB AP CASE REPORT: NORMAL
PATH REPORT.ADDENDUM SPEC: NORMAL
PATH REPORT.FINAL DX SPEC: NORMAL
PATH REPORT.GROSS SPEC: NORMAL

## 2023-10-16 ENCOUNTER — HOSPITAL ENCOUNTER (OUTPATIENT)
Dept: MAMMOGRAPHY | Facility: HOSPITAL | Age: 63
Discharge: HOME | End: 2023-10-16
Admitting: NURSE PRACTITIONER
Payer: COMMERCIAL

## 2023-10-16 DIAGNOSIS — C50.212 MALIGNANT NEOPLASM OF UPPER-INNER QUADRANT OF LEFT BREAST IN FEMALE, ESTROGEN RECEPTOR POSITIVE: ICD-10-CM

## 2023-10-16 DIAGNOSIS — Z17.0 MALIGNANT NEOPLASM OF UPPER-INNER QUADRANT OF LEFT BREAST IN FEMALE, ESTROGEN RECEPTOR POSITIVE: ICD-10-CM

## 2023-10-16 DIAGNOSIS — Z80.9 FAMILY HISTORY OF CANCER: ICD-10-CM

## 2023-10-16 PROCEDURE — 77067 SCR MAMMO BI INCL CAD: CPT

## 2023-10-16 PROCEDURE — 77063 BREAST TOMOSYNTHESIS BI: CPT

## 2023-10-18 ENCOUNTER — OFFICE VISIT (OUTPATIENT)
Dept: FAMILY MEDICINE CLINIC | Facility: CLINIC | Age: 63
End: 2023-10-18
Payer: COMMERCIAL

## 2023-10-18 VITALS
DIASTOLIC BLOOD PRESSURE: 86 MMHG | TEMPERATURE: 97.8 F | HEIGHT: 67 IN | WEIGHT: 194.8 LBS | SYSTOLIC BLOOD PRESSURE: 146 MMHG | HEART RATE: 76 BPM | OXYGEN SATURATION: 96 % | BODY MASS INDEX: 30.57 KG/M2

## 2023-10-18 DIAGNOSIS — Z86.39 HISTORY OF THYROID NODULE: ICD-10-CM

## 2023-10-18 DIAGNOSIS — Z23 FLU VACCINE NEED: ICD-10-CM

## 2023-10-18 DIAGNOSIS — I10 PRIMARY HYPERTENSION: ICD-10-CM

## 2023-10-18 DIAGNOSIS — E01.0 THYROMEGALY: ICD-10-CM

## 2023-10-18 DIAGNOSIS — E11.9 TYPE 2 DIABETES MELLITUS WITHOUT COMPLICATION, WITHOUT LONG-TERM CURRENT USE OF INSULIN: Primary | ICD-10-CM

## 2023-10-18 DIAGNOSIS — R80.9 PROTEINURIA, UNSPECIFIED TYPE: ICD-10-CM

## 2023-10-18 DIAGNOSIS — Z79.899 ENCOUNTER FOR LONG-TERM (CURRENT) USE OF MEDICATIONS: ICD-10-CM

## 2023-10-18 DIAGNOSIS — M77.11 LATERAL EPICONDYLITIS OF RIGHT ELBOW: ICD-10-CM

## 2023-10-18 LAB
ALBUMIN SERPL-MCNC: 5.1 G/DL (ref 3.5–5.2)
ALBUMIN/GLOB SERPL: 2.8 G/DL
ALP SERPL-CCNC: 117 U/L (ref 39–117)
ALT SERPL-CCNC: 39 U/L (ref 1–33)
AST SERPL-CCNC: 23 U/L (ref 1–32)
BASOPHILS # BLD AUTO: 0.03 10*3/MM3 (ref 0–0.2)
BASOPHILS NFR BLD AUTO: 0.5 % (ref 0–1.5)
BILIRUB SERPL-MCNC: 0.5 MG/DL (ref 0–1.2)
BUN SERPL-MCNC: 12 MG/DL (ref 8–23)
BUN/CREAT SERPL: 15.6 (ref 7–25)
CALCIUM SERPL-MCNC: 11 MG/DL (ref 8.6–10.5)
CHLORIDE SERPL-SCNC: 103 MMOL/L (ref 98–107)
CO2 SERPL-SCNC: 29.3 MMOL/L (ref 22–29)
CREAT SERPL-MCNC: 0.77 MG/DL (ref 0.57–1)
EGFRCR SERPLBLD CKD-EPI 2021: 87.9 ML/MIN/1.73
EOSINOPHIL # BLD AUTO: 0.53 10*3/MM3 (ref 0–0.4)
EOSINOPHIL NFR BLD AUTO: 8.2 % (ref 0.3–6.2)
ERYTHROCYTE [DISTWIDTH] IN BLOOD BY AUTOMATED COUNT: 13.2 % (ref 12.3–15.4)
EXPIRATION DATE: ABNORMAL
GLOBULIN SER CALC-MCNC: 1.8 GM/DL
GLUCOSE SERPL-MCNC: 133 MG/DL (ref 65–99)
HBA1C MFR BLD: 6.7 % (ref 4.5–5.7)
HCT VFR BLD AUTO: 42.4 % (ref 34–46.6)
HGB BLD-MCNC: 14.7 G/DL (ref 12–15.9)
IMM GRANULOCYTES # BLD AUTO: 0.02 10*3/MM3 (ref 0–0.05)
IMM GRANULOCYTES NFR BLD AUTO: 0.3 % (ref 0–0.5)
LYMPHOCYTES # BLD AUTO: 1.33 10*3/MM3 (ref 0.7–3.1)
LYMPHOCYTES NFR BLD AUTO: 20.5 % (ref 19.6–45.3)
Lab: ABNORMAL
MCH RBC QN AUTO: 30.9 PG (ref 26.6–33)
MCHC RBC AUTO-ENTMCNC: 34.7 G/DL (ref 31.5–35.7)
MCV RBC AUTO: 89.3 FL (ref 79–97)
MONOCYTES # BLD AUTO: 0.37 10*3/MM3 (ref 0.1–0.9)
MONOCYTES NFR BLD AUTO: 5.7 % (ref 5–12)
NEUTROPHILS # BLD AUTO: 4.2 10*3/MM3 (ref 1.7–7)
NEUTROPHILS NFR BLD AUTO: 64.8 % (ref 42.7–76)
NRBC BLD AUTO-RTO: 0 /100 WBC (ref 0–0.2)
PLATELET # BLD AUTO: 258 10*3/MM3 (ref 140–450)
POTASSIUM SERPL-SCNC: 5 MMOL/L (ref 3.5–5.2)
PROT SERPL-MCNC: 6.9 G/DL (ref 6–8.5)
RBC # BLD AUTO: 4.75 10*6/MM3 (ref 3.77–5.28)
SODIUM SERPL-SCNC: 143 MMOL/L (ref 136–145)
TSH SERPL DL<=0.005 MIU/L-ACNC: 3.27 UIU/ML (ref 0.27–4.2)
WBC # BLD AUTO: 6.48 10*3/MM3 (ref 3.4–10.8)

## 2023-10-18 RX ORDER — METFORMIN HYDROCHLORIDE 500 MG/1
500 TABLET, EXTENDED RELEASE ORAL 2 TIMES DAILY WITH MEALS
Qty: 360 TABLET | Refills: 0 | Status: SHIPPED | OUTPATIENT
Start: 2023-10-18

## 2023-10-18 RX ORDER — SEMAGLUTIDE 1.34 MG/ML
1 INJECTION, SOLUTION SUBCUTANEOUS WEEKLY
Qty: 3 ML | Refills: 12 | Status: SHIPPED | OUTPATIENT
Start: 2023-10-18

## 2023-10-18 RX ORDER — AMLODIPINE BESYLATE 10 MG/1
10 TABLET ORAL DAILY
Qty: 90 TABLET | Refills: 1 | Status: SHIPPED | OUTPATIENT
Start: 2023-10-18

## 2023-10-18 RX ORDER — VALSARTAN 160 MG/1
160 TABLET ORAL DAILY
Qty: 90 TABLET | Refills: 1 | Status: SHIPPED | OUTPATIENT
Start: 2023-10-18

## 2023-10-18 RX ORDER — ROSUVASTATIN CALCIUM 5 MG/1
5 TABLET, COATED ORAL DAILY
Qty: 30 TABLET | Refills: 3 | Status: SHIPPED | OUTPATIENT
Start: 2023-10-18

## 2023-10-18 NOTE — PROGRESS NOTES
Chief Complaint  Diabetes and Hypertension    Subjective        Daysi Lugo presents to [facility]  History of Present Illness  Patient is here today to follow-up on her chronic health conditions:    Hypertension.  She is currently taking valsartan 80 mg daily and amlodipine 10 mg daily.  Patient states that she has no side effects. BP are well controlled.    Type 2 diabetes.  She was diagnosed by a A1c of 8.0 on September 23, 2021.  She is taking metformin 1000mg BID and Ozempic 1 mg weekly. She is tolerating the medication well and denies any side effects.      Thyromegaly.  The patient has had a history of prior thyroid nodules.  She was also started on Synthroid in the past and had an allergic reaction to it so she never went back to the doctor.  Her mother has a history of thyroid cancer, unsure of what type.  Ultrasound performed March 2023 showed an enlarged gland with multiple thyroid nodules (triads 3).    Patient with right elbow pain and tenderness to palpation intermittently for the past couple weeks.  Patient is a right-hand-dominant.  No injury or trauma.  It gradually started hurting.  Denies any swelling, redness, or bruising.  No pain at rest but pain with gripping a pencil or mug.  Patient states it does seem to be improving in the past few days.  Diabetes  She has type 2 diabetes mellitus. No MedicAlert identification noted. The initial diagnosis of diabetes was made 2 years ago. Hypoglycemia symptoms include headaches. Pertinent negatives for hypoglycemia include no confusion, speech difficulty, sweats or tremors. Associated symptoms include polydipsia. Pertinent negatives for diabetes include no blurred vision, no foot paresthesias, no foot ulcerations, no polyuria and no weight loss. Pertinent negatives for hypoglycemia complications include no blackouts, no hospitalization, no nocturnal hypoglycemia, no required assistance and no required glucagon injection.  "Symptoms are stable. She is compliant with treatment most of the time. She is following a generally healthy, high fiber and low fat/cholesterol diet. When asked about meal planning, she reported none. She participates in exercise intermittently. She monitors blood glucose at home 3-4 x per week. Blood glucose monitoring compliance is good. Her overall blood glucose range is 140-180 mg/dl. She does not see a podiatrist.Eye exam is not current.   Hypertension  Associated symptoms include headaches. Pertinent negatives include no blurred vision or sweats.       Objective   Vital Signs:  /86   Pulse 76   Temp 97.8 °F (36.6 °C)   Ht 170.2 cm (67\")   Wt 88.4 kg (194 lb 12.8 oz)   SpO2 96%   BMI 30.51 kg/m²   Estimated body mass index is 30.51 kg/m² as calculated from the following:    Height as of this encounter: 170.2 cm (67\").    Weight as of this encounter: 88.4 kg (194 lb 12.8 oz).               Physical Exam  Vitals and nursing note reviewed.   Constitutional:       Appearance: Normal appearance. She is well-developed and overweight.   HENT:      Head: Normocephalic and atraumatic.      Right Ear: External ear normal.      Left Ear: External ear normal.      Nose: Nose normal.   Eyes:      General: No scleral icterus.     Conjunctiva/sclera: Conjunctivae normal.   Cardiovascular:      Rate and Rhythm: Normal rate and regular rhythm.      Heart sounds: Normal heart sounds.   Pulmonary:      Effort: Pulmonary effort is normal.      Breath sounds: Normal breath sounds.   Musculoskeletal:         General: No swelling, deformity or signs of injury. Normal range of motion.      Cervical back: Normal range of motion and neck supple.      Right lower leg: No edema.      Left lower leg: No edema.      Comments: Tenderness to palpation over the right lateral epicondyle condyle.  No erythema, ecchymosis, or edema.   Skin:     General: Skin is warm and dry.      Findings: No rash.   Neurological:      Mental Status: " She is alert and oriented to person, place, and time.   Psychiatric:         Mood and Affect: Mood normal.         Behavior: Behavior normal.         Thought Content: Thought content normal.         Judgment: Judgment normal.        Result Review :  The following data was reviewed by: Zarina Tineo DO on 10/18/2023:  Common labs          7/17/2023    10:03 7/17/2023    10:53 9/12/2023    14:59 10/18/2023    10:06   Common Labs   Glucose  109  131     BUN  16  12     Creatinine  0.80  0.80     Sodium  140  144     Potassium  4.9  4.0     Chloride  103  102     Calcium  10.1  10.1     Total Protein  6.7      Albumin  4.7  4.4     Total Bilirubin  0.4  0.4     Alkaline Phosphatase  121  122     AST (SGOT)  26  24     ALT (SGPT)  46  39     WBC  5.7  7.39     Hemoglobin  14.2  15.0     Hematocrit  42.8  45.8     Platelets  254  276     Hemoglobin A1C 6.6    6.7      TSH          3/29/2023    16:16 7/17/2023    10:53   TSH   TSH 3.450  3.050                   Assessment and Plan   Diagnoses and all orders for this visit:    1. Type 2 diabetes mellitus without complication, without long-term current use of insulin (Primary)  -     POC Glycosylated Hemoglobin (Hb A1C)  -     Semaglutide, 1 MG/DOSE, (Ozempic, 1 MG/DOSE,) 4 MG/3ML solution pen-injector; Inject 1 mg under the skin into the appropriate area as directed 1 (One) Time Per Week.  Dispense: 3 mL; Refill: 12  -     metFORMIN ER (GLUCOPHAGE-XR) 500 MG 24 hr tablet; Take 1 tablet by mouth 2 (Two) Times a Day With Meals.  Dispense: 360 tablet; Refill: 0  -     rosuvastatin (Crestor) 5 MG tablet; Take 1 tablet by mouth Daily.  Dispense: 30 tablet; Refill: 3  -     Comprehensive Metabolic Panel    2. Flu vaccine need  -     Fluzone >6 Months ([id number])    3. Primary hypertension  -     valsartan (DIOVAN) 160 MG tablet; Take 1 tablet by mouth Daily.  Dispense: 90 tablet; Refill: 1  -     amLODIPine (NORVASC) 10 MG tablet; Take 1 tablet by mouth Daily.  Dispense: 90  tablet; Refill: 1    4. Thyromegaly  -     TSH    5. Proteinuria, unspecified type  -     valsartan (DIOVAN) 160 MG tablet; Take 1 tablet by mouth Daily.  Dispense: 90 tablet; Refill: 1    6. History of thyroid nodule  -     TSH    7. Encounter for long-term (current) use of medications  -     CBC & Differential  -     Comprehensive Metabolic Panel  -     TSH    8. Lateral epicondylitis of right elbow    Patient is here today to follow-up on chronic stable type 2 diabetes, thyromegaly with nodules and proteinuria.  Surveillance labs were obtained today and any medication changes will be made based on lab results and will be called to the patient later this week.     Patient is diabetic and over the age of 40 therefore the current recommendation is to start a statin.  I have sent in a prescription for rosuvastatin 5 mg daily.  Patient will let me know if there is any problems.    Patient is also here to follow-up on uncontrolled hypertension.  I will increase the valsartan to 160 mg daily(max dose due to only one kidney)  She will continue amlodipine 10 mg daily.  If additional blood pressure control is necessary we will use another class of medication.  Surveillance labs were obtained today and any medication changes will be made based on lab results and will be called to the patient later this week.     Patient with right lateral epicondylitis.  Advised she use ice only no heat.  Informed the patient of cause is likely overuse and how to avoid further injury.  Patient may also wear a elbow splint.  If symptoms or not improving over time she was advised to follow-up.         Follow Up   Return in about 3 months (around 1/18/2024) for Diabetes, HTN, Lipids, Fasting.  Patient was given instructions and counseling regarding her condition or for health maintenance advice. Please see specific information pulled into the AVS if appropriate.         Answers submitted by the patient for this visit:  Primary Reason for Visit  (Submitted on 10/11/2023)  What is the primary reason for your visit?: Diabetes

## 2023-10-18 NOTE — PROGRESS NOTES
[facility]     Referring Provider: No ref. provider found     Chief complaint: Routine follow up breast cancer     HPI:   12/29/21:  Seen by Dr Delong  Ms. Daysi Lugo is a 60 yo woman, seen at the request of Dr. Zarina Tineo, for a new diagnosis of left breast cancer. The patient first noticed a lump in her upper inner left breast in October. It has not changed in size since she first noticed it and she denies any associated pain. Her work-up is detailed in the oncologic history below. Prior to this year, it had been a least 25 years since she had a mammogram. She has a past history of a bilateral breast reduction in 2008.     She was recently diagnosed with diabetes and her PCP is trying to get her blood sugar under control. Her most recent hemoglobin A1c on 12/16/21 was 8.5. She also has a personal history of kidney cancer, sp right nephrectomy. Her maternal grandmother had breast cancer and colon cancer. She denies any family history of ovarian cancer. She saw Dr. King earlier today prior to this appointment and Dr. King sent genetic testing.      2/2/22:  Seen by Dr Delong  She underwent left partial mastectomy and SLNB with oncoplastic closure and right reduction for symmetry on 1/17/22. See surgery & pathology details below in oncologic history. She has been recovering well and has no complaints.     5/4/22:  seen by Dr Delong  She returns today for follow up with expected left breast discomfort s/p radiation treatments were completed in 4/2022.  She also right breast discomfort with central thickening, revision by Dr Castro planned in 10/22.  She will start femara today as instructed by Dr Ann.   Her  passed away in 2/22 from colon cancer, she continues to grieve.  He was sick with terminal cancer for 16 months and she was his caregiver.  She has cut back on her work demands, continues to work on her pHD.   On 4/25/22 she was seen by Dr Castro:  Center of the right breast  has a hard area. At time can be painful I explained to patient we will proceed with breast shape improvement with bilateral lateral breast skin tailoring, upper breast fat graft and possible breast induration resection   Her last clinic visit with Dr King was 2/25/22:  Oncotype DX testing shows score of 14, her benefit from chemo is less than 1%.  We will start aromatase enema to Femara. Explained in length side effects of Femara.    Seen in PT/LE clinic in 2/22 and again today:  Currently, negative s/s of lymphedema, infection, seroma, or axillary cording. We did complete a baseline post operative bioimpedance assessment, with current L-Dex score of -5.8 , which is WNL    10/20/22:  Seen by Dr Delong  She returns today for scheduled follow-up. She underwent mammogram prior to her appointment which was benign (see report details below). She remains on Femara and is still tolerating this well. She is scheduled to undergo revision with Dr. Castro next week. She has lumpy areas on both sides that she wants removed. She also complains of occasional pain on both sides.    1/23/23:  She returns today for follow up with no breast complaints, she is happy with her revision done in 11/22.  Occasional sharp random pains in bilateral breasts c/w surgery.  Bilateral breast revision was completed 11/22 by Dr Castro    Her last clinic visit with Dr King was in 9/22:  She started Femara February 2022 and is currently tolerating without any hot flashes or joint pains.  Patient has not not lost weight and she is tolerating Femara well without arthralgias    10/24/23, Interval History:  She returns today for follow up with a left breast lump she has noticed for about 3-4- weeks.    Her last clinic visit with Dr King was in 9/23:  Patient is tolerating Femara well.  I reviewed the CT scan from 2022 which shows hepatic steatosis.  Encouraged her on proper nutrition and decrease fried foods and fatty foods.  She does  not have much arthralgias.  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  Encouraged her to focus on exercise.     Screening mammogram with tomosynthesis 10/16/23 was stable, BiRads 2.  (see full report below)        Oncology/Hematology History   Malignant neoplasm of upper-inner quadrant of left breast in female, estrogen receptor positive   11/8/2021 Initial Diagnosis    Malignant neoplasm of upper-inner quadrant of left breast in female, estrogen receptor positive (HCC)     11/9/2021 Imaging    Bilateral Diagnostic MMG with [facility] & Left Breast US ( [facility]):  MMG:  Scattered fibroglandular densities. In the posterior one-third upper inner quadrant of the left breast at the site of palpable concern corresponding to the overlying triangular skin marker, there is an irregular mass that measures on the order of 2.6 x 2.5 x 2.3 cm. Internal coarse calcifications and area of fat necrosis are noted. I see no other dominant masses in either breast. No areas of architectural distortion are appreciated. There is no evidence for skin thickening, nipple retraction or axillary adenopathy.  US:  Targeted sonographic evaluation of the left breast was performed through the area of palpable concern which corresponds to the 10 o'clock position on the order of 5 cm from the nipple. At this location, there is an irregular hypoechoic mass with internal calcifications. The mass measures 2.4 x 1.9 x 1.4 cm. No detectable internal vascularity is appreciated. Posterior acoustic shadowing is noted.  BI-RADS 4: Suspicious.     12/2/2021 Biopsy    Left Breast, US-Guided Biopsy ( [facility]):    1. Left Breast, 10 O'clock, 5 cm from Nipple, Ultrasound-Guided Biopsies for a Mass: INVASIVE MAMMARY                CARCINOMA, NO SPECIAL TYPE (INVASIVE DUCTAL CARCINOMA).               A. Histologic grade: [place] histologic score II (tubules = 3, nuclei = 2, mitosis = 1).               B. Largest contiguous invasive  focus measures 10 mm in a core.               C. No definitive in situ component identified.                       D. No lymphovascular nor perineural invasion identified.    ER+ (%, strong)  CA+ (%, strong)  HER2 negative (IHC 1+)  Ki-67 8%     12/23/2021 Imaging    Bilateral Breast MRI ([facility]):  Within the posterior one-third upper inner quadrant of the left breast centered at the 10 o'clock position on the order of 8 cm from nipple there is an irregular enhancing mass that measures on the order of 2.5 cm in anterior to posterior dimension, 2.8 cm in the mediolateral dimension and 2.1 cm in the superior-inferior dimension. A signal void is seen within the mass that corresponds to a bowtie-shaped metallic clip. This represents the biopsy-proven site of malignancy.   No other areas of abnormal enhancement or morphology are seen within the left breast. I see no evidence for abnormal left breast skin, nipple or chest wall enhancement.  There is no evidence for left axillary or internal mammary chain adenopathy.    There are no areas of abnormal enhancement or morphology in the right breast. I see no evidence for abnormal right breast skin, nipple or chest wall enhancement and there is no evidence for right axillary or internal mammary chain adenopathy.  BI-RADS 6: Known malignancy.     12/29/2021 Genetic Testing    [employer] Common Hereditary Cancers Panel (47 genes):    Negative     1/17/2022 Surgery    Left ultrasound-guided partial mastectomy and sentinel lymph node biopsy with oncoplastic closure and right reduction for symmetry    1.  Breast, Left, Lumpectomy (88 grams):                A.  Invasive mammary carcinoma, no special type (ductal), [place] histologic grade II        (tubules = 3, nuclei = 2, mitoses = 1), measuring 30 mm maximally with associated         perineural invasion and microcalcifications identified adjacent to bow tie clip and biopsy site change.   B.  Focal  minimal associated  atypical ductal hyperplasia; no definitive in situ carcinoma is identified.   C.  All margins are free of tumor (closest margin inferior at 2.8 mm).  D.  Incidental fibroadenoma 2.6 mm maximally.    E.  Focal fat necrosis.  F.  See synoptic template for complete details and final margin measurements.      2.  Breast, Left, Additional, Medial, Inferior and Lateral Margins, Inked and Oriented Excision:   A.  Benign breast parenchyma with focal organizing fat necrosis; the final medial inferior and lateral margins are free of tumor by an additional 10 mm.      3.  Breast, Left Additional Posterior Margin, Inked and Oriented Excision:                A.  Benign fibroadipose tissue and skeletal muscle; the final posterior margin is free of tumor and         by an additional 3 mm.      4.  Lymph Node, Left Axillary, [place] #1, Excision:                 A.  Benign lymph node (0/1).     5.  Breast, Right, Reduction Mammoplasty (369 grams):                 A.  Benign skin, subcutaneous tissue and breast parenchyma.      6.  Breast, Left, Additional Anterior Margin, Excision:                A.  Benign skin and subcutaneous tissue (tumor measures an estimated 20 mm from the overlying skin surface).     7.  Breast, Left, Reduction Mammoplasty (225 grams):                A.  Benign skin, subcutaneous and breast parenchyma.       Oncotype DX: 14     3/9/2022 - 4/5/2022 Radiation    Radiation OncologyTreatment Course:  Daysi Lugo received 4990 cGy in 20 fractions to LEFT breast.     4/6/2022 -  Hormonal Therapy    Femara     10/14/2022 Imaging    Screening MMG with [facility] ([facility]):  The breasts are heterogeneously dense, which may obscure small masses.   New benign-appearing postsurgical changes are identified in both breasts, including areas of benign-appearing fat necrosis. There are benign-appearing calcifications.  There are no suspicious masses, calcifications, or areas of architectural distortion.  BI-RADS 2: Benign.      10/16/2023 Imaging    Bilateral screening mammogram with tomosynthesis at [facility]  FINDINGS: Bilateral digital CC and MLO mammographic and digital Tomosynthesis images were obtained. Comparison is made to prior studies dated 10/14/2022 and 12/2/2021. Scattered fibroglandular densities are seen throughout both breasts in a pattern which is unchanged. I see no new or dominant masses, areas of architectural distortion or skin  thickening. Areas of fat necrosis and benign calcifications are noted in both breasts. There is no evidence for axillary lymphadenopathy or nipple retraction.   IMPRESSION:  1. There is no evidence for malignancy or significant change in either breast. Routine followup mammography is recommended.   BI-RADS category 2: Benign.           Review of Systems:  See interval history.       Medications:    Current Outpatient Medications:     amLODIPine (NORVASC) 10 MG tablet, Take 1 tablet by mouth Daily., Disp: 90 tablet, Rfl: 1    Blood Glucose Monitoring Suppl (OneTouch Verio Reflect) w/Device kit, See Admin Instructions., Disp: , Rfl:     glucose blood test strip, 1 each by Other route Daily., Disp: 25 each, Rfl: 2    Lancets (freestyle) lancets, 1 each by Other route Daily., Disp: 100 each, Rfl: 4    letrozole (FEMARA) 2.5 MG tablet, Take 1 tablet by mouth Daily., Disp: 30 tablet, Rfl: 2    metFORMIN ER (GLUCOPHAGE-XR) 500 MG 24 hr tablet, Take 1 tablet by mouth 2 (Two) Times a Day With Meals., Disp: 360 tablet, Rfl: 0    rosuvastatin (Crestor) 5 MG tablet, Take 1 tablet by mouth Daily., Disp: 30 tablet, Rfl: 3    Semaglutide, 1 MG/DOSE, (Ozempic, 1 MG/DOSE,) 4 MG/3ML solution pen-injector, Inject 1 mg under the skin into the appropriate area as directed 1 (One) Time Per Week., Disp: 3 mL, Rfl: 12    valsartan (DIOVAN) 160 MG tablet, Take 1 tablet by mouth Daily., Disp: 90 tablet, Rfl: 1      Allergies   Allergen Reactions    Hydrocodone Anaphylaxis     PER PATIENT    Penicillins Itching       Family  "History   Problem Relation Age of Onset    Diabetes type II Mother     Hypertension Mother     Thyroid cancer Mother     Breast cancer Maternal Grandmother     Colon cancer Maternal Grandmother     Diabetes Maternal Grandmother     Stroke Maternal Grandmother     Alzheimer's disease Maternal Grandfather     Malig Hyperthermia Neg Hx        PHYSICAL EXAMINATION:   Vitals:    10/24/23 1348   BP: 140/80        /80 (BP Location: Right arm)   Ht 170.2 cm (67\")   Wt 88 kg (194 lb)   LMP  (LMP Unknown)   BMI 30.38 kg/m²     I reviewed physical exam, no changes noted    ECOG 0 - Asymptomatic  General: NAD, well appearing  Psych: a&o x3, normal mood and affect  Eyes: EOMI, no scleral icterus  ENMT: neck supple without masses or thyromegaly, mucous membranes moist  MSK: normal gait, normal ROM in bilateral shoulders  Lymph nodes: Left axillary scar; no cervical, supraclavicular or axillary lymphadenopathy  Breast: Good symmetry  Right: On inspection there are well-healed mastopexy scars. There is a firm area which surrounds the NAC medially and feels like fat necrosis.  No nipple abnormalities.  Left: On inspection there are well-healed mastopexy scars.  No nipple abnormalities. At the area of concern, 1200, 2 CFN a mass, 2 cm is noted.      Assessment:   1)   63 y.o. F with a diagnosis of left breast cancer 12/2021: Intermediate grade, invasive ductal carcinoma, ER/CO+, Her2 negative. She underwent left partial mastectomy and SLNB with oncoplastic closure and right reduction for symmetry on 1/17/22, pT2N0, anatomic stage IIA, prognostic stage IA. She completed radiation on 4/5/22. She is currently on Femara.    2)  left breast mass    Discussion:   We discussed the new palpable area in the left breast.  It is likely fat necrosis but diagnostic imaging will assist in that assessment.  We will schedule a left diagnostic mammogram and limited US in the near future at [facility].     Dr Delong explained that fat necrosis is " caused by surgery and scar tissue.  I also reassured her that occasional pain is normal after surgery and this should eventually become less frequent.    I advised her to massage daily.along the surgical incisions to lessen scarring, massage techniques demonstrated.    Plan:  -Continue follow-up with Dr. King.  -Continue follow-up with Dr. Castro.  -left diagnostic mammogram with tomosynthesis, left limited US in the near future at [facility] , I will call her with those results and follow up recommendations  - massage to surgical incisions  -She was instructed to call sooner with any questions, concerns or changes on BSE.    Maru Arambula, APRN      CC:  No ref. provider found

## 2023-10-19 NOTE — PROGRESS NOTES
SPOKE WITH PATIENT AWARE OF RESULTS. PATIENT HAS BEEN TAKING 600 MG OF CALCUIM RECOMMEND BY DR. QUIJANO

## 2023-10-24 ENCOUNTER — OFFICE VISIT (OUTPATIENT)
Dept: SURGERY | Facility: CLINIC | Age: 63
End: 2023-10-24
Payer: COMMERCIAL

## 2023-10-24 ENCOUNTER — DOCUMENTATION (OUTPATIENT)
Dept: SURGERY | Facility: CLINIC | Age: 63
End: 2023-10-24
Payer: COMMERCIAL

## 2023-10-24 VITALS
BODY MASS INDEX: 30.45 KG/M2 | DIASTOLIC BLOOD PRESSURE: 80 MMHG | WEIGHT: 194 LBS | HEIGHT: 67 IN | SYSTOLIC BLOOD PRESSURE: 140 MMHG

## 2023-10-24 DIAGNOSIS — Z17.0 MALIGNANT NEOPLASM OF UPPER-INNER QUADRANT OF LEFT BREAST IN FEMALE, ESTROGEN RECEPTOR POSITIVE: Primary | ICD-10-CM

## 2023-10-24 DIAGNOSIS — N63.20 MASS OF LEFT BREAST, UNSPECIFIED QUADRANT: ICD-10-CM

## 2023-10-24 DIAGNOSIS — C50.212 MALIGNANT NEOPLASM OF UPPER-INNER QUADRANT OF LEFT BREAST IN FEMALE, ESTROGEN RECEPTOR POSITIVE: Primary | ICD-10-CM

## 2023-10-24 DIAGNOSIS — Z80.9 FAMILY HISTORY OF CANCER: ICD-10-CM

## 2023-10-24 PROCEDURE — 99214 OFFICE O/P EST MOD 30 MIN: CPT | Performed by: NURSE PRACTITIONER

## 2023-12-11 ENCOUNTER — HOSPITAL ENCOUNTER (OUTPATIENT)
Dept: MAMMOGRAPHY | Facility: HOSPITAL | Age: 63
Discharge: HOME | End: 2023-12-11
Payer: COMMERCIAL

## 2023-12-11 ENCOUNTER — HOSPITAL ENCOUNTER (OUTPATIENT)
Dept: ULTRASOUND IMAGING | Facility: HOSPITAL | Age: 63
Discharge: HOME | End: 2023-12-11
Payer: COMMERCIAL

## 2023-12-11 DIAGNOSIS — Z17.0 MALIGNANT NEOPLASM OF UPPER-INNER QUADRANT OF LEFT BREAST IN FEMALE, ESTROGEN RECEPTOR POSITIVE: ICD-10-CM

## 2023-12-11 DIAGNOSIS — C50.212 MALIGNANT NEOPLASM OF UPPER-INNER QUADRANT OF LEFT BREAST IN FEMALE, ESTROGEN RECEPTOR POSITIVE: ICD-10-CM

## 2023-12-11 DIAGNOSIS — N63.20 MASS OF LEFT BREAST, UNSPECIFIED QUADRANT: ICD-10-CM

## 2023-12-11 PROCEDURE — 76642 ULTRASOUND BREAST LIMITED: CPT

## 2023-12-12 ENCOUNTER — TELEPHONE (OUTPATIENT)
Dept: SURGERY | Facility: CLINIC | Age: 63
End: 2023-12-12
Payer: COMMERCIAL

## 2023-12-12 DIAGNOSIS — C50.212 MALIGNANT NEOPLASM OF UPPER-INNER QUADRANT OF LEFT BREAST IN FEMALE, ESTROGEN RECEPTOR POSITIVE: Primary | ICD-10-CM

## 2023-12-12 DIAGNOSIS — Z17.0 MALIGNANT NEOPLASM OF UPPER-INNER QUADRANT OF LEFT BREAST IN FEMALE, ESTROGEN RECEPTOR POSITIVE: Primary | ICD-10-CM

## 2023-12-12 NOTE — TELEPHONE ENCOUNTER
Results of left limited US from 12/11/23 reported to patient.  The area of concern is c/w surgical changes, fat necrosis .  Routine follow up is recommended.    A bilateral screening mammogram with tomosynthesis followed by exam in 12 months will be arranged.    IMPRESSION:  Palpable benign-appearing fat necrosis at 11:00 in the left breast.  Further management should be based on clinical assessment. Recommend  annual screening mammogram in October 2024.     BI-RADS Category 2: Benign

## 2023-12-13 DIAGNOSIS — I10 PRIMARY HYPERTENSION: ICD-10-CM

## 2023-12-13 DIAGNOSIS — E11.9 TYPE 2 DIABETES MELLITUS WITHOUT COMPLICATION, WITHOUT LONG-TERM CURRENT USE OF INSULIN: ICD-10-CM

## 2023-12-13 DIAGNOSIS — R80.9 PROTEINURIA, UNSPECIFIED TYPE: ICD-10-CM

## 2023-12-13 RX ORDER — METFORMIN HYDROCHLORIDE 500 MG/1
500 TABLET, EXTENDED RELEASE ORAL 2 TIMES DAILY WITH MEALS
Qty: 360 TABLET | Refills: 1 | Status: SHIPPED | OUTPATIENT
Start: 2023-12-13

## 2023-12-13 RX ORDER — AMLODIPINE BESYLATE 10 MG/1
10 TABLET ORAL DAILY
Qty: 90 TABLET | Refills: 1 | Status: SHIPPED | OUTPATIENT
Start: 2023-12-13

## 2023-12-13 RX ORDER — LETROZOLE 2.5 MG/1
2.5 TABLET, FILM COATED ORAL DAILY
Qty: 30 TABLET | Refills: 2 | Status: SHIPPED | OUTPATIENT
Start: 2023-12-13

## 2023-12-13 RX ORDER — ROSUVASTATIN CALCIUM 5 MG/1
5 TABLET, COATED ORAL DAILY
Qty: 30 TABLET | Refills: 1 | Status: SHIPPED | OUTPATIENT
Start: 2023-12-13

## 2023-12-13 RX ORDER — VALSARTAN 160 MG/1
160 TABLET ORAL DAILY
Qty: 90 TABLET | Refills: 1 | Status: SHIPPED | OUTPATIENT
Start: 2023-12-13

## 2024-01-30 ENCOUNTER — OFFICE VISIT (OUTPATIENT)
Dept: FAMILY MEDICINE CLINIC | Facility: CLINIC | Age: 64
End: 2024-01-30
Payer: COMMERCIAL

## 2024-01-30 VITALS
WEIGHT: 193.4 LBS | DIASTOLIC BLOOD PRESSURE: 89 MMHG | HEART RATE: 82 BPM | BODY MASS INDEX: 30.35 KG/M2 | HEIGHT: 67 IN | OXYGEN SATURATION: 97 % | SYSTOLIC BLOOD PRESSURE: 138 MMHG

## 2024-01-30 DIAGNOSIS — I10 PRIMARY HYPERTENSION: ICD-10-CM

## 2024-01-30 DIAGNOSIS — Z79.899 ENCOUNTER FOR LONG-TERM (CURRENT) USE OF MEDICATIONS: ICD-10-CM

## 2024-01-30 DIAGNOSIS — R80.9 PROTEINURIA, UNSPECIFIED TYPE: ICD-10-CM

## 2024-01-30 DIAGNOSIS — E11.9 TYPE 2 DIABETES MELLITUS WITHOUT COMPLICATION, WITHOUT LONG-TERM CURRENT USE OF INSULIN: Primary | ICD-10-CM

## 2024-01-30 LAB
EXPIRATION DATE: ABNORMAL
EXPIRATION DATE: ABNORMAL
HBA1C MFR BLD: 7 % (ref 4.5–5.7)
Lab: ABNORMAL
Lab: ABNORMAL
POC CREATININE URINE: 8.8
POC MICROALBUMIN URINE: 80

## 2024-01-30 RX ORDER — ROSUVASTATIN CALCIUM 5 MG/1
5 TABLET, COATED ORAL DAILY
Qty: 90 TABLET | Refills: 1 | Status: SHIPPED | OUTPATIENT
Start: 2024-01-30

## 2024-01-30 RX ORDER — VALSARTAN 160 MG/1
160 TABLET ORAL DAILY
Qty: 90 TABLET | Refills: 1 | Status: SHIPPED | OUTPATIENT
Start: 2024-01-30

## 2024-01-30 RX ORDER — SEMAGLUTIDE 1.34 MG/ML
1 INJECTION, SOLUTION SUBCUTANEOUS WEEKLY
Qty: 9 ML | Refills: 1 | Status: SHIPPED | OUTPATIENT
Start: 2024-01-30

## 2024-01-30 RX ORDER — METFORMIN HYDROCHLORIDE 500 MG/1
500 TABLET, EXTENDED RELEASE ORAL 2 TIMES DAILY WITH MEALS
Qty: 360 TABLET | Refills: 1 | Status: SHIPPED | OUTPATIENT
Start: 2024-01-30

## 2024-01-30 RX ORDER — AMLODIPINE BESYLATE 10 MG/1
10 TABLET ORAL DAILY
Qty: 90 TABLET | Refills: 1 | Status: SHIPPED | OUTPATIENT
Start: 2024-01-30

## 2024-01-30 NOTE — PROGRESS NOTES
Chief Complaint  Diabetes    Subjective        Daysi Lugo presents to [facility]  History of Present Illness  Patient is here today to follow-up on her chronic health conditions:    Hypertension.  She is currently taking valsartan 160 mg daily and amlodipine 10 mg daily.  Patient states that she has no side effects. BP are well controlled.    Type 2 diabetes.  She was diagnosed by a A1c of 8.0 on September 23, 2021.  She is taking metformin 1000mg BID and Ozempic 1 mg weekly. She is tolerating the medication well and denies any side effects.      Thyromegaly.  The patient has had a history of prior thyroid nodules.  She was also started on Synthroid in the past and had an allergic reaction to it so she never went back to the doctor.  Her mother has a history of thyroid cancer, unsure of what type.  Ultrasound performed March 2023 showed an enlarged gland with multiple thyroid nodules (triads 3).  Diabetes  She has type 2 diabetes mellitus. No MedicAlert identification noted. The initial diagnosis of diabetes was made 2 years ago. Hypoglycemia symptoms include headaches and sweats. Pertinent negatives for hypoglycemia include no confusion, speech difficulty or tremors. Associated symptoms include polydipsia and polyuria. Pertinent negatives for diabetes include no blurred vision, no foot paresthesias, no foot ulcerations and no weight loss. Pertinent negatives for hypoglycemia complications include no blackouts, no hospitalization, no nocturnal hypoglycemia, no required assistance and no required glucagon injection. Symptoms are stable. She is compliant with treatment most of the time. She is following a generally healthy diet. She monitors blood glucose at home 5+ x per day. Blood glucose monitoring compliance is good. Her highest blood glucose is 140-180 mg/dl. Her overall blood glucose range is 130-140 mg/dl. She does not see a podiatrist. Eye exam is current.       Objective  "  Vital Signs:  /89   Pulse 82   Ht 170.2 cm (67\")   Wt 87.7 kg (193 lb 6.4 oz)   SpO2 97%   BMI 30.29 kg/m²   Estimated body mass index is 30.29 kg/m² as calculated from the following:    Height as of this encounter: 170.2 cm (67\").    Weight as of this encounter: 87.7 kg (193 lb 6.4 oz).               Physical Exam  Vitals and nursing note reviewed.   Constitutional:       Appearance: Normal appearance. She is well-developed.   HENT:      Head: Normocephalic and atraumatic.   Eyes:      General: No scleral icterus.     Conjunctiva/sclera: Conjunctivae normal.   Cardiovascular:      Rate and Rhythm: Normal rate and regular rhythm.      Heart sounds: Normal heart sounds.   Pulmonary:      Effort: Pulmonary effort is normal.      Breath sounds: Normal breath sounds.   Musculoskeletal:         General: Normal range of motion.      Cervical back: Normal range of motion and neck supple.   Skin:     General: Skin is warm and dry.      Capillary Refill: Capillary refill takes less than 2 seconds.      Findings: No rash.   Neurological:      Mental Status: She is alert and oriented to person, place, and time.   Psychiatric:         Mood and Affect: Mood normal.         Behavior: Behavior normal.         Thought Content: Thought content normal.         Judgment: Judgment normal.        Result Review :    The following data was reviewed by: Zarina Tineo DO on 01/30/2024:  Common labs          9/12/2023    14:59 10/18/2023    10:06 10/18/2023    10:38 1/30/2024    09:55   Common Labs   Glucose 131   133     BUN 12   12     Creatinine 0.80   0.77     Sodium 144   143     Potassium 4.0   5.0     Chloride 102   103     Calcium 10.1   11.0     Total Protein   6.9     Albumin 4.4   5.1     Total Bilirubin 0.4   0.5     Alkaline Phosphatase 122   117     AST (SGOT) 24   23     ALT (SGPT) 39   39     WBC 7.39   6.48     Hemoglobin 15.0   14.7     Hematocrit 45.8   42.4     Platelets 276   258     Hemoglobin A1C  6.7   " 7.0      TSH          3/29/2023    16:16 7/17/2023    10:53 10/18/2023    10:38   TSH   TSH 3.450  3.050  3.270                   Assessment and Plan     Diagnoses and all orders for this visit:    1. Type 2 diabetes mellitus without complication, without long-term current use of insulin (Primary)  -     POC Glycosylated Hemoglobin (Hb A1C)  -     Comprehensive Metabolic Panel  -     Semaglutide, 1 MG/DOSE, (Ozempic, 1 MG/DOSE,) 4 MG/3ML solution pen-injector; Inject 1 mg under the skin into the appropriate area as directed 1 (One) Time Per Week.  Dispense: 9 mL; Refill: 1  -     rosuvastatin (Crestor) 5 MG tablet; Take 1 tablet by mouth Daily.  Dispense: 90 tablet; Refill: 1  -     metFORMIN ER (GLUCOPHAGE-XR) 500 MG 24 hr tablet; Take 1 tablet by mouth 2 (Two) Times a Day With Meals.  Dispense: 360 tablet; Refill: 1    2. Primary hypertension  -     Comprehensive Metabolic Panel  -     valsartan (DIOVAN) 160 MG tablet; Take 1 tablet by mouth Daily.  Dispense: 90 tablet; Refill: 1  -     amLODIPine (NORVASC) 10 MG tablet; Take 1 tablet by mouth Daily.  Dispense: 90 tablet; Refill: 1    3. Proteinuria, unspecified type  -     POC Microalbumin  -     valsartan (DIOVAN) 160 MG tablet; Take 1 tablet by mouth Daily.  Dispense: 90 tablet; Refill: 1    4. Encounter for long-term (current) use of medications  -     CBC & Differential  -     Comprehensive Metabolic Panel  -     TSH  -     Lipid Panel    Patient is here today to follow-up on chronic stable type 2 diabetes, hypertension, and proteinuria.  Surveillance labs were obtained today and any medication changes will be made based on lab results and will be called to the patient later this week.          Follow Up     Return in about 3 months (around 4/30/2024) for Diabetes, HTN.  Patient was given instructions and counseling regarding her condition or for health maintenance advice. Please see specific information pulled into the AVS if appropriate.         Answers  submitted by the patient for this visit:  Primary Reason for Visit (Submitted on 1/25/2024)  What is the primary reason for your visit?: Diabetes

## 2024-01-31 LAB
ALBUMIN SERPL-MCNC: 4.6 G/DL (ref 3.9–4.9)
ALBUMIN/GLOB SERPL: 2.1 {RATIO} (ref 1.2–2.2)
ALP SERPL-CCNC: 145 IU/L (ref 44–121)
ALT SERPL-CCNC: 29 IU/L (ref 0–32)
AST SERPL-CCNC: 21 IU/L (ref 0–40)
BASOPHILS # BLD AUTO: 0 X10E3/UL (ref 0–0.2)
BASOPHILS NFR BLD AUTO: 0 %
BILIRUB SERPL-MCNC: 0.5 MG/DL (ref 0–1.2)
BUN SERPL-MCNC: 17 MG/DL (ref 8–27)
BUN/CREAT SERPL: 19 (ref 12–28)
CALCIUM SERPL-MCNC: 10.4 MG/DL (ref 8.7–10.3)
CHLORIDE SERPL-SCNC: 102 MMOL/L (ref 96–106)
CHOLEST SERPL-MCNC: 127 MG/DL (ref 100–199)
CO2 SERPL-SCNC: 24 MMOL/L (ref 20–29)
CREAT SERPL-MCNC: 0.91 MG/DL (ref 0.57–1)
EGFRCR SERPLBLD CKD-EPI 2021: 71 ML/MIN/1.73
EOSINOPHIL # BLD AUTO: 0.5 X10E3/UL (ref 0–0.4)
EOSINOPHIL NFR BLD AUTO: 7 %
ERYTHROCYTE [DISTWIDTH] IN BLOOD BY AUTOMATED COUNT: 12.8 % (ref 11.7–15.4)
GLOBULIN SER CALC-MCNC: 2.2 G/DL (ref 1.5–4.5)
GLUCOSE SERPL-MCNC: 153 MG/DL (ref 70–99)
HCT VFR BLD AUTO: 44.6 % (ref 34–46.6)
HDLC SERPL-MCNC: 38 MG/DL
HGB BLD-MCNC: 15.1 G/DL (ref 11.1–15.9)
IMM GRANULOCYTES # BLD AUTO: 0 X10E3/UL (ref 0–0.1)
IMM GRANULOCYTES NFR BLD AUTO: 0 %
LDLC SERPL CALC-MCNC: 56 MG/DL (ref 0–99)
LYMPHOCYTES # BLD AUTO: 1.3 X10E3/UL (ref 0.7–3.1)
LYMPHOCYTES NFR BLD AUTO: 18 %
MCH RBC QN AUTO: 30.5 PG (ref 26.6–33)
MCHC RBC AUTO-ENTMCNC: 33.9 G/DL (ref 31.5–35.7)
MCV RBC AUTO: 90 FL (ref 79–97)
MONOCYTES # BLD AUTO: 0.4 X10E3/UL (ref 0.1–0.9)
MONOCYTES NFR BLD AUTO: 5 %
NEUTROPHILS # BLD AUTO: 5.3 X10E3/UL (ref 1.4–7)
NEUTROPHILS NFR BLD AUTO: 70 %
PLATELET # BLD AUTO: 260 X10E3/UL (ref 150–450)
POTASSIUM SERPL-SCNC: 4.8 MMOL/L (ref 3.5–5.2)
PROT SERPL-MCNC: 6.8 G/DL (ref 6–8.5)
RBC # BLD AUTO: 4.95 X10E6/UL (ref 3.77–5.28)
SODIUM SERPL-SCNC: 142 MMOL/L (ref 134–144)
TRIGL SERPL-MCNC: 199 MG/DL (ref 0–149)
TSH SERPL DL<=0.005 MIU/L-ACNC: 2.93 UIU/ML (ref 0.45–4.5)
VLDLC SERPL CALC-MCNC: 33 MG/DL (ref 5–40)
WBC # BLD AUTO: 7.6 X10E3/UL (ref 3.4–10.8)

## 2024-02-01 ENCOUNTER — TELEPHONE (OUTPATIENT)
Dept: PLASTIC SURGERY | Facility: CLINIC | Age: 64
End: 2024-02-01
Payer: COMMERCIAL

## 2024-02-01 NOTE — TELEPHONE ENCOUNTER
Caller: MAYTE EMMANUEL    Relationship: SELF    Best call back number: [phone]     What is the best time to reach you: ANYTIME    Who are you requesting to speak with (clinical staff, provider,  specific staff member): SURGERY SCHEDULER    Do you know the name of the person who called: MAYTE    What was the call regarding: PT IS NEEDING TO RESCHEDULE THE SURGERY CURRENTLY SET FOR 4/24/24 - WANTS TO MOVE IT TO NOVEMBER IF POSSIBLE    Is it okay if the provider responds through [employer]: YES - ALSO OKAY TO LEAVE DETAILED VM

## 2024-02-02 NOTE — TELEPHONE ENCOUNTER
Left voicemail, please return call regarding rescheduling surgery, we do have a date available for November.    -[facility] please transfer call to offfice if patient returns call.

## 2024-02-05 ENCOUNTER — LAB (OUTPATIENT)
Dept: FAMILY MEDICINE CLINIC | Facility: CLINIC | Age: 64
End: 2024-02-05
Payer: COMMERCIAL

## 2024-02-05 DIAGNOSIS — R80.9 PROTEINURIA, UNSPECIFIED TYPE: Primary | ICD-10-CM

## 2024-02-05 NOTE — TELEPHONE ENCOUNTER
Patient returned call and is fine with surgery at [facility] on 11/25/24, pre-op has been rescheduled at Dr. Castro's [place] clinic. Patient aware.

## 2024-02-06 DIAGNOSIS — Z85.528 HISTORY OF RENAL CELL CARCINOMA: ICD-10-CM

## 2024-02-06 DIAGNOSIS — E11.9 TYPE 2 DIABETES MELLITUS WITHOUT COMPLICATION, WITHOUT LONG-TERM CURRENT USE OF INSULIN: ICD-10-CM

## 2024-02-06 DIAGNOSIS — I10 PRIMARY HYPERTENSION: ICD-10-CM

## 2024-02-06 DIAGNOSIS — R80.9 PROTEINURIA, UNSPECIFIED TYPE: Primary | ICD-10-CM

## 2024-02-06 LAB
PROT 24H UR-MRATE: 302 MG/24 HR (ref 30–150)
PROT UR-MCNC: 20.1 MG/DL

## 2024-02-15 ENCOUNTER — HOSPITAL ENCOUNTER (OUTPATIENT)
Dept: ULTRASOUND IMAGING | Facility: HOSPITAL | Age: 64
Discharge: HOME | End: 2024-02-15
Admitting: FAMILY MEDICINE
Payer: COMMERCIAL

## 2024-02-15 DIAGNOSIS — Z85.528 HISTORY OF RENAL CELL CARCINOMA: ICD-10-CM

## 2024-02-15 DIAGNOSIS — R80.9 PROTEINURIA, UNSPECIFIED TYPE: ICD-10-CM

## 2024-02-15 DIAGNOSIS — E11.9 TYPE 2 DIABETES MELLITUS WITHOUT COMPLICATION, WITHOUT LONG-TERM CURRENT USE OF INSULIN: ICD-10-CM

## 2024-02-15 DIAGNOSIS — I10 PRIMARY HYPERTENSION: ICD-10-CM

## 2024-02-15 PROCEDURE — 76775 US EXAM ABDO BACK WALL LIM: CPT

## 2024-03-04 RX ORDER — LETROZOLE 2.5 MG/1
2.5 TABLET, FILM COATED ORAL DAILY
Qty: 90 TABLET | Refills: 1 | Status: SHIPPED | OUTPATIENT
Start: 2024-03-04

## 2024-03-28 ENCOUNTER — LAB (OUTPATIENT)
Dept: LAB | Facility: HOSPITAL | Age: 64
End: 2024-03-28
Payer: COMMERCIAL

## 2024-03-28 ENCOUNTER — OFFICE VISIT (OUTPATIENT)
Dept: ONCOLOGY | Facility: CLINIC | Age: 64
End: 2024-03-28
Payer: COMMERCIAL

## 2024-03-28 VITALS
HEART RATE: 84 BPM | SYSTOLIC BLOOD PRESSURE: 143 MMHG | BODY MASS INDEX: 31.08 KG/M2 | OXYGEN SATURATION: 96 % | DIASTOLIC BLOOD PRESSURE: 87 MMHG | RESPIRATION RATE: 18 BRPM | HEIGHT: 67 IN | WEIGHT: 198 LBS | TEMPERATURE: 97.8 F

## 2024-03-28 DIAGNOSIS — Z17.0 MALIGNANT NEOPLASM OF UPPER-INNER QUADRANT OF LEFT BREAST IN FEMALE, ESTROGEN RECEPTOR POSITIVE: ICD-10-CM

## 2024-03-28 DIAGNOSIS — E83.52 HYPERCALCEMIA: ICD-10-CM

## 2024-03-28 DIAGNOSIS — Z17.0 MALIGNANT NEOPLASM OF UPPER-INNER QUADRANT OF LEFT BREAST IN FEMALE, ESTROGEN RECEPTOR POSITIVE: Primary | ICD-10-CM

## 2024-03-28 DIAGNOSIS — C50.212 MALIGNANT NEOPLASM OF UPPER-INNER QUADRANT OF LEFT BREAST IN FEMALE, ESTROGEN RECEPTOR POSITIVE: Primary | ICD-10-CM

## 2024-03-28 DIAGNOSIS — C50.212 MALIGNANT NEOPLASM OF UPPER-INNER QUADRANT OF LEFT BREAST IN FEMALE, ESTROGEN RECEPTOR POSITIVE: ICD-10-CM

## 2024-03-28 LAB
ALBUMIN SERPL-MCNC: 4.8 G/DL (ref 3.5–5.2)
ALBUMIN/GLOB SERPL: 1.8 G/DL
ALP SERPL-CCNC: 145 U/L (ref 39–117)
ALT SERPL W P-5'-P-CCNC: 31 U/L (ref 1–33)
ANION GAP SERPL CALCULATED.3IONS-SCNC: 12.4 MMOL/L (ref 5–15)
AST SERPL-CCNC: 25 U/L (ref 1–32)
BASOPHILS # BLD AUTO: 0.02 10*3/MM3 (ref 0–0.2)
BASOPHILS NFR BLD AUTO: 0.3 % (ref 0–1.5)
BILIRUB SERPL-MCNC: 0.4 MG/DL (ref 0–1.2)
BUN SERPL-MCNC: 22 MG/DL (ref 8–23)
BUN/CREAT SERPL: 24.7 (ref 7–25)
CALCIUM SPEC-SCNC: 10.5 MG/DL (ref 8.6–10.5)
CHLORIDE SERPL-SCNC: 104 MMOL/L (ref 98–107)
CO2 SERPL-SCNC: 28.6 MMOL/L (ref 22–29)
CREAT SERPL-MCNC: 0.89 MG/DL (ref 0.57–1)
DEPRECATED RDW RBC AUTO: 42.9 FL (ref 37–54)
EGFRCR SERPLBLD CKD-EPI 2021: 73.4 ML/MIN/1.73
EOSINOPHIL # BLD AUTO: 0.48 10*3/MM3 (ref 0–0.4)
EOSINOPHIL NFR BLD AUTO: 7.3 % (ref 0.3–6.2)
ERYTHROCYTE [DISTWIDTH] IN BLOOD BY AUTOMATED COUNT: 12.8 % (ref 12.3–15.4)
GLOBULIN UR ELPH-MCNC: 2.6 GM/DL
GLUCOSE SERPL-MCNC: 156 MG/DL (ref 65–99)
HCT VFR BLD AUTO: 44.3 % (ref 34–46.6)
HGB BLD-MCNC: 14.7 G/DL (ref 12–15.9)
IMM GRANULOCYTES # BLD AUTO: 0.01 10*3/MM3 (ref 0–0.05)
IMM GRANULOCYTES NFR BLD AUTO: 0.2 % (ref 0–0.5)
LYMPHOCYTES # BLD AUTO: 1.59 10*3/MM3 (ref 0.7–3.1)
LYMPHOCYTES NFR BLD AUTO: 24 % (ref 19.6–45.3)
MCH RBC QN AUTO: 30.4 PG (ref 26.6–33)
MCHC RBC AUTO-ENTMCNC: 33.2 G/DL (ref 31.5–35.7)
MCV RBC AUTO: 91.5 FL (ref 79–97)
MONOCYTES # BLD AUTO: 0.32 10*3/MM3 (ref 0.1–0.9)
MONOCYTES NFR BLD AUTO: 4.8 % (ref 5–12)
NEUTROPHILS NFR BLD AUTO: 4.2 10*3/MM3 (ref 1.7–7)
NEUTROPHILS NFR BLD AUTO: 63.4 % (ref 42.7–76)
NRBC BLD AUTO-RTO: 0 /100 WBC (ref 0–0.2)
PLATELET # BLD AUTO: 241 10*3/MM3 (ref 140–450)
PMV BLD AUTO: 10.4 FL (ref 6–12)
POTASSIUM SERPL-SCNC: 4.7 MMOL/L (ref 3.5–5.2)
PROT SERPL-MCNC: 7.4 G/DL (ref 6–8.5)
RBC # BLD AUTO: 4.84 10*6/MM3 (ref 3.77–5.28)
SODIUM SERPL-SCNC: 145 MMOL/L (ref 136–145)
WBC NRBC COR # BLD AUTO: 6.62 10*3/MM3 (ref 3.4–10.8)

## 2024-03-28 PROCEDURE — 36415 COLL VENOUS BLD VENIPUNCTURE: CPT

## 2024-03-28 PROCEDURE — 85025 COMPLETE CBC W/AUTO DIFF WBC: CPT

## 2024-03-28 PROCEDURE — 99214 OFFICE O/P EST MOD 30 MIN: CPT | Performed by: INTERNAL MEDICINE

## 2024-03-28 PROCEDURE — 80053 COMPREHEN METABOLIC PANEL: CPT

## 2024-03-28 NOTE — PROGRESS NOTES
Subjective     REASON FOR follow-up:     1.  PT2N0, anatomic stage IIa, prognostic stage Ia invasive ductal carcinoma of the left breast,  intermediate grade ER positive, [place] positive HER-2/juan negative s/p left partial mastectomy with sentinel lymph node biopsy with oncoplastic closure and right reduction for symmetry on 2022.    Oncotype DX testin, no need for chemotherapy   Femara 2022, started Femara will start towards the end of radiation    2.  S/p right nephrectomy for renal cell carcinoma at age 31        History of Present Illness   Patient is a 62-year-old female with recently diagnosed stage IIa left breast cancer ER/[place] positive HER-2/juan negative.  She underwent surgery as of 2022.      Patient's Oncotype DX is back. It is 14. She does not require any chemotherapy. She will require to start on endocrine therapy and being postmenopausal will start her on aromatase inhibitor. She also will require a DEXA scan to evaluate for osteopenia. She is healed up from surgery. She is going to start radiation. Patient is going to receive a total dose of 3990 cGy in 15 fractions followed by boost to the tumor bed 1000 cGy in 5 fractions.    Interval history: Patient is tolerating Femara well.  I reviewed the CT scan from  which shows hepatic steatosis.  Encouraged her on proper nutrition and decrease fried foods and fatty foods.  She does not have much arthralgias.  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  Encouraged her to focus on exercise.    She is to take calcium and vitamin D supplements.    2024: Patient currently on Femara.  She has issues with arthralgias and stiffness.        Oncologic history:  patient is a 61-year-old female who is a  for [employer]. She recently back in  noticed a mass in the left breast upper inner quadrant. She was playing with a cat  and noticed some pain in the breast at which time she felt the breast and felt this mass. She has had history of nipple discharge on and off for years in the left breast but that has been very minimal. There is no bloody discharge. She has not had mammogram for years, at least 25 years according to her. So when she felt the mass she went to her primary care physician Dr. Zarina Tineo who ordered a diagnostic mammogram and found in abnormality in the left breast at 10 o'clock position which was 2.6 x 2.5 x 2.3 cm. Subsequently she underwent a biopsy which was consistent with invasive ductal carcinoma ER/AK positive HER-2/juan negative.    She does have a family history of malignancy with her grandmother having: And breast cancer on her maternal side. Her maternal great-grandmother had breast cancer. Her mother had thyroid cancer. Patient has had renal cell cancer at age 31 and had to undergo left nephrectomy. She also had a thyroid nodule for which she was seen at [facility] by physician. He had placed her on Synthroid and she had a very severe allergic reaction to Synthroid with swelling and he discontinued that. Patient was to be followed in a years time and has not gone.. But she has not had any issues with that    More recently she lost her  3 weeks ago as he was dealing with colon cancer. He was 70 years of age. He did not want any treatment. She had to take care of of him for the last 16 months and if no her symptoms. 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    Details are as follows    November 10, 2021: Bilateral diagnostic mammogram and ultrasound  FINDINGS: Bilateral digital CC and MLO mammographic and Tomosynthesis  images were obtained. No prior examination is available for comparison.  Scattered fibroglandular densities are seen throughout both breasts. In  the posterior one-third upper inner quadrant of the left breast at  the  site of palpable concern corresponding to the overlying triangular skin  marker, there is an irregular mass that measures on the order of 2.6 x  2.5 x 2.3 cm. Internal coarse calcifications and areas of fat necrosis  are noted. I see no other dominant masses in either breast. No areas of  architectural distortion are appreciated. There is no evidence for skin  thickening, nipple retraction or axillary adenopathy.     ULTRASOUND: Targeted sonographic evaluation of the left breast was  performed through the area of palpable concern which corresponds to the  10-o'clock position on the order of 5 cm from the nipple. At this  location, there is an irregular hypoechoic mass with internal  calcifications. The mass measures 2.4 x 1.9 x 1.4 cm. No detectable  internal vascularity is appreciated. Posterior acoustic shadowing is  noted.     IMPRESSION:  1. There is an irregular mass in the left breast at the 10-o'clock  position corresponding to the site of palpable concern. Sonographically,  the mass measures on the order of 2.4 cm in greatest dimension.  Correlation with an ultrasound-guided left breast biopsy is recommended.  2. There are no findings suspicious for malignancy in the right breast.     November 9, 2021: Ultrasound-guided left breast biopsy showed    Final Diagnosis   1. Left Breast, 10 O'clock, 5 cm from Nipple, Ultrasound-Guided Biopsies for a Mass: INVASIVE MAMMARY                CARCINOMA, NO SPECIAL TYPE (INVASIVE DUCTAL CARCINOMA).               A. Histologic grade: Eldon histologic score II (tubules = 3, nuclei = 2, mitosis = 1).               B. Largest contiguous invasive focus measures 10 mm in a core.               C. No definitive in situ component identified.                       D. No lymphovascular nor perineural invasion identified.     Estrogen receptor: %  Progesterone receptor: %  HER-2/juan: 1+, negative  Ki-67: 8%    December 28, 2021: MRI of the  breast    FINDINGS:Scattered fibroglandular tissue is seen throughout both  breasts. Minimal background parenchymal enhancement of both breasts is  noted.     Within the posterior one-third upper inner quadrant of the left breast  centered at the 10 o'clock position on the order of 8 cm from nipple  there is an irregular enhancing mass that measures on the order of 2.5  cm in anterior to posterior dimension, 2.8 cm in the mediolateral  dimension and 2.1 cm in the superior-inferior dimension. A signal void  is seen within the mass that corresponds to a bowtie-shaped metallic  clip. This represents the biopsy-proven site of malignancy.     No other areas of abnormal enhancement or morphology are seen within the  left breast. I see no evidence for abnormal left breast skin, nipple or  chest wall enhancement.  There is no evidence for left axillary or  internal mammary chain adenopathy.     There are no areas of abnormal enhancement or morphology in the right  breast. I see no evidence for abnormal right breast skin, nipple or  chest wall enhancement and there is no evidence for right axillary or  internal mammary chain adenopathy.     IMPRESSION:  1. Biopsy-proven malignancy in the left breast at the 10 o'clock  position in the posterior one-third that measures on the order of 2.8 cm  in greatest dimension with a centrally located bowtie shaped metallic  clip. No other suspicious findings are seen within the left breast and  there is no evidence for left axillary or internal mammary chain  adenopathy.  2. There are no findings suspicious for malignancy in the right breast.     BI-RADS category 6: Known malignancy.     This report was finalized on 12/28/2021 7:24 AM by Dr. Sixto Ramos M.D.     Patient has been scheduled for further recommendations. She has appointment with Dr. Beth Delong at 11 AM today.    January 17, 2022: Patient is s/p surgery left partial mastectomy with sentinel lymph node  biopsy      Synoptic Checklist  INVASIVE CARCINOMA OF THE BREAST: Resection (INVASIVE CARCINOMA OF THE BREAST: COMPLETE EXCISION - All Specimens)  Specimen Laterality Left  .  TUMOR  Tumor Site Upper outer quadrant  Clock position  10 o'clock  Tumor Site Distance from nipple (Centimeters): 5 cm  Histologic Type Invasive carcinoma of no special type (ductal)  Histologic Grade ([place] Histologic Score)  Glandular (Acinar) / Tubular Differentiation Score 3  Nuclear Pleomorphism Score 2  Mitotic Rate Score 1  Overall Grade Grade 2 (scores of 6 or 7)  Tumor Size Greatest dimension of largest invasive focus (Millimeters): 30 mm  Tumor Focality Single focus of invasive carcinoma  Ductal Carcinoma In Situ (DCIS) Not identified  Lobular Carcinoma In Situ (LCIS) Not identified  Tumor Extent  Lymphovascular Invasion Not identified  Dermal Lymphovascular Invasion Not identified  Microcalcifications Present in invasive carcinoma  Present in non-neoplastic tissue  Treatment Effect in the Breast No known presurgical therapy  .  MARGINS  Margin Status for Invasive Carcinoma All margins negative for invasive carcinoma  Distance from Invasive Carcinoma to Closest Margin 12.8 mm  Closest Margin(s) to Invasive Carcinoma Inferior  Distance from Invasive Carcinoma to Posterior Margin 23 mm  Distance from Invasive Carcinoma to Superior Margin 13 mm  Distance from Invasive Carcinoma to Medial Margin  Comment  The patient's corresponding left breast core biopsy material is pulled and reviewed for comparison ([id number]). The  tumors appear similar; no discordance is noted.  Selected slides from this case are shared in internal consultation with Bam Campbell and  who concur.  [facility]/kds  .  REGIONAL LYMPH NODES  Regional Lymph Node Status All regional lymph nodes negative for tumor  Total Number of Lymph Nodes Examined (sentinel and non-sentinel) 1  Number of [place] Nodes Examined 1  .  PATHOLOGIC STAGE CLASSIFICATION (pTNM, AJCC 8th  Edition)    pT Category pT2  Regional Lymph Nodes Modifier (sn): [place] node(s) evaluated.  pN Category pN0    ER %  [place] %  HER-2/juan 1±  Ki-67 8%    Oncotype DX 14. No plans for chemo    We will start Femara    Past Medical History:   Diagnosis Date    Anesthesia complication     SLOW TO WAKE UP    Arthritis     Breast cancer 2021    Invasive ductal carcinoma of left breast     GERD (gastroesophageal reflux disease)     history    History of kidney cancer     S/P right nephrectomy in 1991    Hypertension     Tumor, thyroid     Type 2 diabetes mellitus         Past Surgical History:   Procedure Laterality Date    APPENDECTOMY      BACK SURGERY  1990    BREAST BIOPSY Right     BREAST LUMPECTOMY WITH SENTINEL NODE BIOPSY Left 01/17/2022    Procedure: Left ultrasound-guided partial mastectomy and sentinel lymph node biopsy;  Surgeon: Beth Delong MD;  Location: [facility];  Service: General;  Laterality: Left;    BREAST RECONSTRUCTION Bilateral 01/17/2022    Procedure: Right breast reduction/mastopexy, left breast oncoplastic closure, bilateral chest liposuction;  Surgeon: Simon Castro MD;  Location: [facility];  Service: Plastics;  Laterality: Bilateral;    BREAST RECONSTRUCTION Bilateral 10/26/2022    Procedure: BREAST RECONSTRUCTION REVISION with skin tailoring, fat graft and induration resection.;  Surgeon: Simon Castro MD;  Location: [facility];  Service: Plastics;  Laterality: Bilateral;    COLONOSCOPY      COLONOSCOPY W/ POLYPECTOMY N/A 9/19/2023    Procedure: COLONOSCOPY WITH POLYPECTOMY;  Surgeon: Sadia Lopez DO;  Location: [facility];  Service: Gastroenterology;  Laterality: N/A;  diverticulosis  right colon polyp x1 (hot snare)  sigmoid polyp x1 (hot snare)    FAT GRAFTING Bilateral 10/26/2022    Procedure: FAT GRAFTING;  Surgeon: Simon Castro MD;  Location: [facility];  Service: Plastics;  Laterality: Bilateral;    HYSTERECTOMY  1992     KNEE SURGERY Right 2001    RECONSTRUCTION    KNEE SURGERY Left 2003    RECONSTRUCTION    LASIK Bilateral     NEPHRECTOMY Right 1991    REDUCTION MAMMAPLASTY  2008    THYROID BIOPSY Bilateral 2010    TONSILLECTOMY          Current Outpatient Medications on File Prior to Visit   Medication Sig Dispense Refill    amLODIPine (NORVASC) 10 MG tablet Take 1 tablet by mouth Daily. 90 tablet 1    Blood Glucose Monitoring Suppl (OneTouch Verio Reflect) w/Device kit See Admin Instructions.      glucose blood test strip 1 each by Other route Daily. 25 each 2    Lancets (freestyle) lancets 1 each by Other route Daily. 100 each 4    letrozole (FEMARA) 2.5 MG tablet TAKE 1 TABLET DAILY 90 tablet 1    metFORMIN ER (GLUCOPHAGE-XR) 500 MG 24 hr tablet Take 1 tablet by mouth 2 (Two) Times a Day With Meals. 360 tablet 1    rosuvastatin (Crestor) 5 MG tablet Take 1 tablet by mouth Daily. 90 tablet 1    Semaglutide, 1 MG/DOSE, (Ozempic, 1 MG/DOSE,) 4 MG/3ML solution pen-injector Inject 1 mg under the skin into the appropriate area as directed 1 (One) Time Per Week. 9 mL 1    valsartan (DIOVAN) 160 MG tablet Take 1 tablet by mouth Daily. 90 tablet 1     No current facility-administered medications on file prior to visit.        ALLERGIES:    Allergies   Allergen Reactions    Hydrocodone Anaphylaxis     PER PATIENT    Penicillins Itching        Social History     Socioeconomic History    Marital status:      Spouse name: Lucas    Number of children: 0    Highest education level: Bachelor's degree (e.g., BA, AB, BS)   Tobacco Use    Smoking status: Former     Current packs/day: 0.00     Average packs/day: 0.3 packs/day for 21.0 years (5.3 ttl pk-yrs)     Types: Cigarettes     Start date:      Quit date: 1991     Years since quittin.2     Passive exposure: Never    Smokeless tobacco: Never   Vaping Use    Vaping status: Never Used   Substance and Sexual Activity    Alcohol use: Not Currently    Drug use: Never     Sexual activity: Not Currently     Partners: Male     Birth control/protection: Post-menopausal, None        Family History   Problem Relation Age of Onset    Diabetes type II Mother     Hypertension Mother     Thyroid cancer Mother     Breast cancer Maternal Grandmother     Colon cancer Maternal Grandmother     Diabetes Maternal Grandmother     Stroke Maternal Grandmother     Alzheimer's disease Maternal Grandfather     Malig Hyperthermia Neg Hx         Review of Systems   Constitutional:  Positive for fatigue. Negative for appetite change, chills, diaphoresis, fever and unexpected weight change.   HENT:  Negative for hearing loss, sore throat and trouble swallowing.    Respiratory:  Negative for cough, chest tightness, shortness of breath and wheezing.    Cardiovascular:  Negative for chest pain, palpitations and leg swelling.   Gastrointestinal:  Positive for nausea. Negative for abdominal distention, abdominal pain, constipation, diarrhea and vomiting.   Genitourinary:  Negative for dysuria, frequency, hematuria and urgency.   Musculoskeletal:  Negative for joint swelling.        No muscle weakness.   Skin:  Negative for rash and wound.   Neurological:  Positive for headaches. Negative for seizures, syncope, speech difficulty, weakness and numbness.   Hematological:  Negative for adenopathy. Does not bruise/bleed easily.   Psychiatric/Behavioral:  Positive for sleep disturbance. Negative for behavioral problems, confusion and suicidal ideas.    All other systems reviewed and are negative.  I have reviewed and confirmed the accuracy of the ROS as documented by the MA/LPN/RN Marisa King MD      Family history:  Mother had thyroid cancer. Grandmother had colon cancer and breast cancer on the maternal side and great grandmother had breast cancer on the maternal side. Patient has had thyroid nodule which was biopsied 10 years ago and benign. She had renal cell cancer s/p left nephrectomy.    OB/GYN history  Age  "of menstruation: 13 years  Age of menopause: 50+ years   0 para 0, no miscarriages  Patient was exposed to birth control pills only the posterior of her marriage and took it just for 1 year  Patient did not take any postmenopausal hormone replacement therapy  Patient had hysterectomy 30 years ago.    Social history: She lost her  just recently and is willing upset about that. She works as a  in [place]. She smoked just a few cigarettes for short very short time when she was in college. She does not drink alcohol. She has no kids.    Past medical history:  Hypertension, followed by her primary care  Diabetes mellitus for which she is on Metformin  Thyroid nodule which on biopsy was benign in the past    Objective     Vitals:    24 1355   BP: 143/87   Pulse: 84   Resp: 18   Temp: 97.8 °F (36.6 °C)   TempSrc: Temporal   SpO2: 96%   Weight: 89.8 kg (198 lb)   Height: 170.2 cm (67.01\")   PainSc:   7   PainLoc: Wrist  Comment: left           3/28/2024     1:51 PM   Current Status   ECOG score 0       Physical Exam      Patient screened positive for depression based on a PHQ-9 score of 0 on 2024. Follow-up recommendations include: PCP managing depression.      CONSTITUTIONAL:  Vital signs reviewed.  No distress, looks comfortable.  EYES:  Conjunctivae and lids unremarkable.  PERRLA  EARS,NOSE,MOUTH,THROAT:  Ears and nose appear unremarkable.  Lips, teeth, gums appear unremarkable.  RESPIRATORY:  Normal respiratory effort.  Lungs clear to auscultation bilaterally.  BREAST: Right breast: No skin changes, no evidence of breast mass, no nipple discharge, no evidence of any right axillary adenopathy or right supraclavicular adenopathy  Left breast: No evidence of any skin changes, no evidence of any left breast mass and no evidence of left nipple discharge as well as no left axillary adenopathy or left supraclavicular adenopathy.  CARDIOVASCULAR:  Normal S1, S2.  No " murmurs rubs or gallops.  No significant lower extremity edema.  GASTROINTESTINAL: Abdomen appears unremarkable.  Nontender.  No hepatomegaly.  No splenomegaly.  LYMPHATIC:  No cervical, supraclavicular, axillary lymphadenopathy.  SKIN:  Warm.  No rashes.  PSYCHIATRIC:  Normal judgment and insight.  Normal mood and affect.    I have reexamined the patient and the results are consistent with the previously documented exam. Marisa King MD   RECENT LABS:  Hematology WBC   Date Value Ref Range Status   03/28/2024 6.62 3.40 - 10.80 10*3/mm3 Final   01/30/2024 7.6 3.4 - 10.8 x10E3/uL Final     RBC   Date Value Ref Range Status   03/28/2024 4.84 3.77 - 5.28 10*6/mm3 Final   01/30/2024 4.95 3.77 - 5.28 x10E6/uL Final     Hemoglobin   Date Value Ref Range Status   03/28/2024 14.7 12.0 - 15.9 g/dL Final     Hematocrit   Date Value Ref Range Status   03/28/2024 44.3 34.0 - 46.6 % Final     Platelets   Date Value Ref Range Status   03/28/2024 241 140 - 450 10*3/mm3 Final          Assessment & Plan     1.  T2 N0 M0, anatomic stage IIa , prognostic stage Ib invasive ductal carcinoma of the left breast at 10 o'clock position, grade 2, ER 91 x 100%, NV 91/100%, HER-2/juan 1+, Ki-67 8%.  No evidence of lymphovascular space invasion or perineural invasion  Patient has not had screening mammogram for 25 years  She felt a mass in September 2021 when she was holding the cat and noticed pain in the left breast upper inner quadrant and then she felt the mass, she was seen by her primary care physician  Diagnostic mammogram, November 10, 2021: Bilateral showed a 2.5 x 2.6 x 2.3 cm mass in the posterior one third upper inner quadrant of the left breast.  Internal coarse calcification and areas of fat necrosis were noted.  No other dominant no masses were seen.  There was no skin thickening, nipple retraction or axillary adenopathy  Ultrasound showed a 2.4 x 1.9 x 1.4 cm mass in the 10 o'clock position 5 cm from the nipple  December 2,  2021 ultrasound-guided left breast biopsy showed invasive ductal carcinoma, grade 2 with a Eldon score of 6, %, %, HER-2/juan 1+, Ki-67 8%  December 23, 2021: Patient underwent MRI of the breast which showed a 2.5 x 2.8 x 2.1 cm irregular enhancing mass at 10 o'clock position, 8 cm from the nipple.  There was no evidence of any left axillary or internal mammary chain lymphadenopathy.  Right breast was negative  I had a lengthy discussion with the patient about the nature of the breast cancer that was present.  We told her that was the most common type of breast cancer and given that it was ER/[place] positive HER-2/juan negative without any lymphovascular or perineural invasion and Ki-67 of 8% it does not appear to be a highly aggressive disease.  He states that the lump did not increase in size since she first noticed it.  We then discussed about options of therapy of surgery upfront, followed by evaluation for Oncotype DX testing to see if she will be eligible for any chemotherapy and also will be eligible for endocrine therapy.  Patient is to see Dr. Beth Delong  today and we will await her input, I did discuss that likely the best option is for consideration of surgery upfront and will await to hear from Dr. Delong  However patient has family history of breast cancer in her maternal grandmother and great-grandmother and colon cancer in her maternal grandmother, thyroid cancer in her mother and personal history of renal cell cancer.  We then discussed about obtaining INVITAE genetic test today and has been ordered in our office for the 9 breast cancer gene with reflux to be 47 gene panel.  I told her surgical options will be discussed by Dr. Delong.  January 17, 2022 Left breast lumpectomy, grade 2 invasive ductal carcinoma measuring 30 mm with perineural invasion and microcalcifications identified adjacent to the clip and biopsy site change.  Focal minimal associated atypical ductal hyperplasia  but no definite in situ carcinoma is identified all margins are free of tumor closest margin being 2.8 mm.  Incidental fibroadenoma 2.6 mm maximally with focal fat necrosis.  0 of 1 sentinel lymph node negative.  Right breast mammoplasty was benign  February 25, 2022: Patient already seen by radiation oncology with plans to start radiation.  Oncotype DX testing shows score of 14, her benefit from chemo is less than 1%  February 2022: Started Femara   September 12, 2023: Continue Femara tolerating very well.  March 28, 2024: Currently on Femara but noticing worsening arthralgias and stiffness      2.  Family history of malignancy:  However patient has family history of breast cancer in her maternal grandmother and great-grandmother and colon cancer in her maternal grandmother, thyroid cancer in her mother and personal history of renal cell cancer.  We then discussed about obtaining INVITAE genetic test today and has been ordered in our office for the 9 breast cancer gene with reflux to be 47 gene panel.  INVITAE genetic test negative for 47 genes    3.  Hypertension currently on lisinopril followed by primary care physician.  Today mildly elevated blood pressure  Stable    4.  History of diabetes mellitus currently on Metformin    5.  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  History of renal cell cancer s/p left nephrectomy without evidence of recurrence and at age 31    7.  Osteopenia: Reviewed the bone density which is within normal limits    Plan  Hold Femara secondary to arthralgias and stiffness  Continue calcium and vitamin D supplements  Reviewed mammogram from October 2023 which is negative   continue exercise, proper nutrition  Follow-up with me in 6 weeks with labs .  We can consider switching to Aromasin    Monitoring high  risk medication    MD Dr. Zarina Hernandez Dr., Dr.

## 2024-04-02 ENCOUNTER — PREP FOR SURGERY (OUTPATIENT)
Dept: OTHER | Facility: HOSPITAL | Age: 64
End: 2024-04-02
Payer: COMMERCIAL

## 2024-04-02 ENCOUNTER — TELEPHONE (OUTPATIENT)
Dept: PLASTIC SURGERY | Facility: CLINIC | Age: 64
End: 2024-04-02
Payer: COMMERCIAL

## 2024-04-02 DIAGNOSIS — N65.0 DEFORMITY AND DISPROPORTION OF RECONSTRUCTED BREAST: Primary | ICD-10-CM

## 2024-04-02 DIAGNOSIS — N65.1 DEFORMITY AND DISPROPORTION OF RECONSTRUCTED BREAST: Primary | ICD-10-CM

## 2024-04-02 NOTE — TELEPHONE ENCOUNTER
Patient aware of [facility] closure and surgery. Patient aware of options; reschedule appointments to [facility] and surgery to [facility] or Dr. Castro can order a referral to another plastic surgeon in [place].    Patient would like to go ahead and reschedule for [facility] at this time but would like a message with the plastic surgeons Dr. Castro recommends.    Pre and post op appointments have been rescheduled for the [facility]. ([address] [place] [address])    Surgery at [facility] on 11/25/24 ([address] [place] [address])    -Sent [employer] message with plastic surgeons that Dr. Castro can order a referral for should patient decide to be referred.

## 2024-04-02 NOTE — TELEPHONE ENCOUNTER
Left voicemail, please return call regarding changes for upcoming appointments and surgery with Dr. Castro.

## 2024-04-10 ENCOUNTER — TELEPHONE (OUTPATIENT)
Dept: ONCOLOGY | Facility: CLINIC | Age: 64
End: 2024-04-10
Payer: COMMERCIAL

## 2024-04-10 NOTE — TELEPHONE ENCOUNTER
"  Caller: Daysi Lugo \"CD\"    Relationship to patient: Self    Best call back number: [phone]    Type of visit: LAB AND F/U     Requested date: PUSH OUT TO NEXT WEEK    If rescheduling, when is the original appointment: 5/9/24  "

## 2024-05-01 ENCOUNTER — OFFICE VISIT (OUTPATIENT)
Dept: FAMILY MEDICINE CLINIC | Facility: CLINIC | Age: 64
End: 2024-05-01
Payer: COMMERCIAL

## 2024-05-01 VITALS
HEIGHT: 67 IN | SYSTOLIC BLOOD PRESSURE: 126 MMHG | HEART RATE: 76 BPM | BODY MASS INDEX: 31.3 KG/M2 | OXYGEN SATURATION: 100 % | WEIGHT: 199.4 LBS | DIASTOLIC BLOOD PRESSURE: 82 MMHG

## 2024-05-01 DIAGNOSIS — E78.2 MIXED HYPERLIPIDEMIA: ICD-10-CM

## 2024-05-01 DIAGNOSIS — E11.9 TYPE 2 DIABETES MELLITUS WITHOUT COMPLICATION, WITHOUT LONG-TERM CURRENT USE OF INSULIN: ICD-10-CM

## 2024-05-01 DIAGNOSIS — E11.65 TYPE 2 DIABETES MELLITUS WITH HYPERGLYCEMIA, WITHOUT LONG-TERM CURRENT USE OF INSULIN: Primary | ICD-10-CM

## 2024-05-01 DIAGNOSIS — I10 PRIMARY HYPERTENSION: ICD-10-CM

## 2024-05-01 DIAGNOSIS — Z79.899 ENCOUNTER FOR LONG-TERM (CURRENT) USE OF MEDICATIONS: ICD-10-CM

## 2024-05-01 DIAGNOSIS — E04.2 MULTIPLE THYROID NODULES: ICD-10-CM

## 2024-05-01 DIAGNOSIS — M25.432 WRIST SWELLING, LEFT: ICD-10-CM

## 2024-05-01 DIAGNOSIS — M25.532 LEFT WRIST PAIN: ICD-10-CM

## 2024-05-01 LAB
EXPIRATION DATE: ABNORMAL
HBA1C MFR BLD: 7.3 % (ref 4.5–5.7)
Lab: ABNORMAL
TSH SERPL DL<=0.005 MIU/L-ACNC: 3.15 UIU/ML (ref 0.27–4.2)
URATE SERPL-MCNC: 6.2 MG/DL (ref 2.4–5.7)

## 2024-05-01 PROCEDURE — 83036 HEMOGLOBIN GLYCOSYLATED A1C: CPT | Performed by: FAMILY MEDICINE

## 2024-05-01 PROCEDURE — 99214 OFFICE O/P EST MOD 30 MIN: CPT | Performed by: FAMILY MEDICINE

## 2024-05-01 RX ORDER — SEMAGLUTIDE 2.68 MG/ML
2 INJECTION, SOLUTION SUBCUTANEOUS WEEKLY
Qty: 9 ML | Refills: 0 | Status: SHIPPED | OUTPATIENT
Start: 2024-05-01

## 2024-05-01 RX ORDER — SEMAGLUTIDE 2.68 MG/ML
2 INJECTION, SOLUTION SUBCUTANEOUS WEEKLY
Qty: 9 ML | Refills: 0 | Status: SHIPPED | OUTPATIENT
Start: 2024-05-01 | End: 2024-05-01 | Stop reason: SDUPTHER

## 2024-05-01 NOTE — TELEPHONE ENCOUNTER
"  Caller: Daysi Lugo \"CD\"    Relationship: Self    Best call back number: [phone]    Requested Prescriptions:   Requested Prescriptions     Pending Prescriptions Disp Refills    Semaglutide, 2 MG/DOSE, (Ozempic, 2 MG/DOSE,) 8 MG/3ML solution pen-injector 9 mL 0     Sig: Inject 2 mg under the skin into the appropriate area as directed 1 (One) Time Per Week.        Pharmacy where request should be sent: [facility] [address] [phone] [facility] [phone]      Last office visit with prescribing clinician: 5/1/2024   Last telemedicine visit with prescribing clinician: Visit date not found   Next office visit with prescribing clinician: 8/1/2024     Additional details provided by patient: PATIENT STATES THAT THIS PRESCRIPTION HAS BEEN CANCELED AND SHE WOULD LIKE TO KNOW IF IT CAN BE SENT AGAIN.     Ana Ochoa Rep   05/01/24 10:56 EDT           "

## 2024-05-16 ENCOUNTER — HOSPITAL ENCOUNTER (OUTPATIENT)
Dept: ULTRASOUND IMAGING | Facility: HOSPITAL | Age: 64
Discharge: HOME | End: 2024-05-16
Admitting: FAMILY MEDICINE
Payer: COMMERCIAL

## 2024-05-16 DIAGNOSIS — E04.2 MULTIPLE THYROID NODULES: ICD-10-CM

## 2024-05-16 PROCEDURE — 76536 US EXAM OF HEAD AND NECK: CPT

## 2024-06-13 ENCOUNTER — OFFICE VISIT (OUTPATIENT)
Dept: ONCOLOGY | Facility: CLINIC | Age: 64
End: 2024-06-13
Payer: COMMERCIAL

## 2024-06-13 ENCOUNTER — LAB (OUTPATIENT)
Dept: LAB | Facility: HOSPITAL | Age: 64
End: 2024-06-13
Payer: COMMERCIAL

## 2024-06-13 VITALS
DIASTOLIC BLOOD PRESSURE: 83 MMHG | TEMPERATURE: 98.3 F | HEART RATE: 72 BPM | BODY MASS INDEX: 30.23 KG/M2 | RESPIRATION RATE: 18 BRPM | SYSTOLIC BLOOD PRESSURE: 132 MMHG | OXYGEN SATURATION: 98 % | HEIGHT: 67 IN | WEIGHT: 192.6 LBS

## 2024-06-13 DIAGNOSIS — C50.212 MALIGNANT NEOPLASM OF UPPER-INNER QUADRANT OF LEFT BREAST IN FEMALE, ESTROGEN RECEPTOR POSITIVE: Primary | ICD-10-CM

## 2024-06-13 DIAGNOSIS — Z17.0 MALIGNANT NEOPLASM OF UPPER-INNER QUADRANT OF LEFT BREAST IN FEMALE, ESTROGEN RECEPTOR POSITIVE: Primary | ICD-10-CM

## 2024-06-13 DIAGNOSIS — E83.52 HYPERCALCEMIA: ICD-10-CM

## 2024-06-13 DIAGNOSIS — Z17.0 MALIGNANT NEOPLASM OF UPPER-INNER QUADRANT OF LEFT BREAST IN FEMALE, ESTROGEN RECEPTOR POSITIVE: ICD-10-CM

## 2024-06-13 DIAGNOSIS — C50.212 MALIGNANT NEOPLASM OF UPPER-INNER QUADRANT OF LEFT BREAST IN FEMALE, ESTROGEN RECEPTOR POSITIVE: ICD-10-CM

## 2024-06-13 LAB
ALBUMIN SERPL-MCNC: 4.5 G/DL (ref 3.5–5.2)
ALBUMIN/GLOB SERPL: 1.7 G/DL
ALP SERPL-CCNC: 127 U/L (ref 39–117)
ALT SERPL W P-5'-P-CCNC: 20 U/L (ref 1–33)
ANION GAP SERPL CALCULATED.3IONS-SCNC: 12.7 MMOL/L (ref 5–15)
AST SERPL-CCNC: 19 U/L (ref 1–32)
BASOPHILS # BLD AUTO: 0.02 10*3/MM3 (ref 0–0.2)
BASOPHILS NFR BLD AUTO: 0.3 % (ref 0–1.5)
BILIRUB SERPL-MCNC: 0.5 MG/DL (ref 0–1.2)
BUN SERPL-MCNC: 14 MG/DL (ref 8–23)
BUN/CREAT SERPL: 17.3 (ref 7–25)
CALCIUM SPEC-SCNC: 9.8 MG/DL (ref 8.6–10.5)
CHLORIDE SERPL-SCNC: 102 MMOL/L (ref 98–107)
CO2 SERPL-SCNC: 26.3 MMOL/L (ref 22–29)
CREAT SERPL-MCNC: 0.81 MG/DL (ref 0.57–1)
DEPRECATED RDW RBC AUTO: 44 FL (ref 37–54)
EGFRCR SERPLBLD CKD-EPI 2021: 82.2 ML/MIN/1.73
EOSINOPHIL # BLD AUTO: 0.24 10*3/MM3 (ref 0–0.4)
EOSINOPHIL NFR BLD AUTO: 3.4 % (ref 0.3–6.2)
ERYTHROCYTE [DISTWIDTH] IN BLOOD BY AUTOMATED COUNT: 13 % (ref 12.3–15.4)
GLOBULIN UR ELPH-MCNC: 2.6 GM/DL
GLUCOSE SERPL-MCNC: 132 MG/DL (ref 65–99)
HCT VFR BLD AUTO: 43.5 % (ref 34–46.6)
HGB BLD-MCNC: 14.3 G/DL (ref 12–15.9)
IMM GRANULOCYTES # BLD AUTO: 0.02 10*3/MM3 (ref 0–0.05)
IMM GRANULOCYTES NFR BLD AUTO: 0.3 % (ref 0–0.5)
LYMPHOCYTES # BLD AUTO: 1.39 10*3/MM3 (ref 0.7–3.1)
LYMPHOCYTES NFR BLD AUTO: 19.6 % (ref 19.6–45.3)
MCH RBC QN AUTO: 30.3 PG (ref 26.6–33)
MCHC RBC AUTO-ENTMCNC: 32.9 G/DL (ref 31.5–35.7)
MCV RBC AUTO: 92.2 FL (ref 79–97)
MONOCYTES # BLD AUTO: 0.38 10*3/MM3 (ref 0.1–0.9)
MONOCYTES NFR BLD AUTO: 5.4 % (ref 5–12)
NEUTROPHILS NFR BLD AUTO: 5.04 10*3/MM3 (ref 1.7–7)
NEUTROPHILS NFR BLD AUTO: 71 % (ref 42.7–76)
NRBC BLD AUTO-RTO: 0 /100 WBC (ref 0–0.2)
PLATELET # BLD AUTO: 243 10*3/MM3 (ref 140–450)
PMV BLD AUTO: 10.1 FL (ref 6–12)
POTASSIUM SERPL-SCNC: 4.7 MMOL/L (ref 3.5–5.2)
PROT SERPL-MCNC: 7.1 G/DL (ref 6–8.5)
RBC # BLD AUTO: 4.72 10*6/MM3 (ref 3.77–5.28)
SODIUM SERPL-SCNC: 141 MMOL/L (ref 136–145)
WBC NRBC COR # BLD AUTO: 7.09 10*3/MM3 (ref 3.4–10.8)

## 2024-06-13 PROCEDURE — 36415 COLL VENOUS BLD VENIPUNCTURE: CPT

## 2024-06-13 PROCEDURE — 85025 COMPLETE CBC W/AUTO DIFF WBC: CPT

## 2024-06-13 PROCEDURE — 80053 COMPREHEN METABOLIC PANEL: CPT

## 2024-06-13 RX ORDER — TAMOXIFEN CITRATE 20 MG/1
20 TABLET ORAL DAILY
Qty: 30 TABLET | Refills: 5 | Status: SHIPPED | OUTPATIENT
Start: 2024-06-13 | End: 2024-07-13

## 2024-06-13 NOTE — PROGRESS NOTES
Subjective     REASON FOR follow-up:     1.  PT2N0, anatomic stage IIa, prognostic stage Ia invasive ductal carcinoma of the left breast,  intermediate grade ER positive, WA positive HER-2/juan negative s/p left partial mastectomy with sentinel lymph node biopsy with oncoplastic closure and right reduction for symmetry on 2022.    Oncotype DX testin, no need for chemotherapy   Femara 2022, started Femara will start towards the end of radiation    2.  S/p right nephrectomy for renal cell carcinoma at age 31        History of Present Illness   Patient is a 62-year-old female with recently diagnosed stage IIa left breast cancer ER/WA positive HER-2/juan negative.  She underwent surgery as of 2022.      Patient's Oncotype DX is back. It is 14. She does not require any chemotherapy. She will require to start on endocrine therapy and being postmenopausal will start her on aromatase inhibitor. She also will require a DEXA scan to evaluate for osteopenia. She is healed up from surgery. She is going to start radiation. Patient is going to receive a total dose of 3990 cGy in 15 fractions followed by boost to the tumor bed 1000 cGy in 5 fractions.    Interval history: Patient is tolerating Femara well.  I reviewed the CT scan from  which shows hepatic steatosis.  Encouraged her on proper nutrition and decrease fried foods and fatty foods.  She does not have much arthralgias.  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  Encouraged her to focus on exercise.    She is to take calcium and vitamin D supplements.    2024: Patient currently on Femara.  She has issues with arthralgias and stiffness.    2024: Patient currently is off Femara and continues to have joint pains though improved.  Her left wrist is still hurting.  There is reduction of joint pains by 25%.  Prior to starting Femara she never had this issue of joint pains.   We had discussed initially consideration of switching to Aromasin but given the fact that Aromasin can also cause joint pains and her joint pains are not completely resolved we discussed about trying tamoxifen.    Patient also had palpable mass in the left breast for which she underwent ultrasound back in December 2023.  They thought it was cyst.  Her screening mammogram in October 2023 was negative but because she had a palpable mass Dr. Blake had done a ultrasound of the left breast.Diagnostic mammogram was ordered but not done.  Ultrasound December 11, 2023 had shown at 11 o'clock position 3 cm from the nipple deep to the surgical scarring there is a 1.9 x 1.3 x 1 0.8 cm predominantly cystic mass which in the region of an area of benign appearing fat necrosis.  It was thought to be a palpable benign-appearing fat necrosis at 11:00 in the left breast.    However patient thinks it may have grown slightly and given the fact that it has grown I will have Dr. Delong examine her and see if this is still consistent with a fat necrosis/cyst versus any concern for biopsy.  I can feel the left breast mass which is about 1.5 cm right about the nipple around 11 o'clock position.  It is tender.  No skin changes        Oncologic history:  patient is a 61-year-old female who is a  for [employer]. She recently back in September October 2021 noticed a mass in the left breast upper inner quadrant. She was playing with a cat and noticed some pain in the breast at which time she felt the breast and felt this mass. She has had history of nipple discharge on and off for years in the left breast but that has been very minimal. There is no bloody discharge. She has not had mammogram for years, at least 25 years according to her. So when she felt the mass she went to her primary care physician Dr. Zarina Tineo who ordered a diagnostic mammogram and found in abnormality in the left breast at 10 o'clock  position which was 2.6 x 2.5 x 2.3 cm. Subsequently she underwent a biopsy which was consistent with invasive ductal carcinoma ER/MN positive HER-2/juan negative.    She does have a family history of malignancy with her grandmother having: And breast cancer on her maternal side. Her maternal great-grandmother had breast cancer. Her mother had thyroid cancer. Patient has had renal cell cancer at age 31 and had to undergo left nephrectomy. She also had a thyroid nodule for which she was seen at [place] by physician. He had placed her on Synthroid and she had a very severe allergic reaction to Synthroid with swelling and he discontinued that. Patient was to be followed in a years time and has not gone.. But she has not had any issues with that    More recently she lost her  3 weeks ago as he was dealing with colon cancer. He was 70 years of age. He did not want any treatment. She had to take care of of him for the last 16 months and if no her symptoms. 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    Details are as follows    November 10, 2021: Bilateral diagnostic mammogram and ultrasound  FINDINGS: Bilateral digital CC and MLO mammographic and Tomosynthesis  images were obtained. No prior examination is available for comparison.  Scattered fibroglandular densities are seen throughout both breasts. In  the posterior one-third upper inner quadrant of the left breast at the  site of palpable concern corresponding to the overlying triangular skin  marker, there is an irregular mass that measures on the order of 2.6 x  2.5 x 2.3 cm. Internal coarse calcifications and areas of fat necrosis  are noted. I see no other dominant masses in either breast. No areas of  architectural distortion are appreciated. There is no evidence for skin  thickening, nipple retraction or axillary adenopathy.     ULTRASOUND: Targeted sonographic evaluation of the left  breast was  performed through the area of palpable concern which corresponds to the  10-o'clock position on the order of 5 cm from the nipple. At this  location, there is an irregular hypoechoic mass with internal  calcifications. The mass measures 2.4 x 1.9 x 1.4 cm. No detectable  internal vascularity is appreciated. Posterior acoustic shadowing is  noted.     IMPRESSION:  1. There is an irregular mass in the left breast at the 10-o'clock  position corresponding to the site of palpable concern. Sonographically,  the mass measures on the order of 2.4 cm in greatest dimension.  Correlation with an ultrasound-guided left breast biopsy is recommended.  2. There are no findings suspicious for malignancy in the right breast.     November 9, 2021: Ultrasound-guided left breast biopsy showed    Final Diagnosis   1. Left Breast, 10 O'clock, 5 cm from Nipple, Ultrasound-Guided Biopsies for a Mass: INVASIVE MAMMARY                CARCINOMA, NO SPECIAL TYPE (INVASIVE DUCTAL CARCINOMA).               A. Histologic grade: Eldon histologic score II (tubules = 3, nuclei = 2, mitosis = 1).               B. Largest contiguous invasive focus measures 10 mm in a core.               C. No definitive in situ component identified.                       D. No lymphovascular nor perineural invasion identified.     Estrogen receptor: %  Progesterone receptor: %  HER-2/juan: 1+, negative  Ki-67: 8%    December 28, 2021: MRI of the breast    FINDINGS:Scattered fibroglandular tissue is seen throughout both  breasts. Minimal background parenchymal enhancement of both breasts is  noted.     Within the posterior one-third upper inner quadrant of the left breast  centered at the 10 o'clock position on the order of 8 cm from nipple  there is an irregular enhancing mass that measures on the order of 2.5  cm in anterior to posterior dimension, 2.8 cm in the mediolateral  dimension and 2.1 cm in the superior-inferior dimension. A  signal void  is seen within the mass that corresponds to a bowtie-shaped metallic  clip. This represents the biopsy-proven site of malignancy.     No other areas of abnormal enhancement or morphology are seen within the  left breast. I see no evidence for abnormal left breast skin, nipple or  chest wall enhancement.  There is no evidence for left axillary or  internal mammary chain adenopathy.     There are no areas of abnormal enhancement or morphology in the right  breast. I see no evidence for abnormal right breast skin, nipple or  chest wall enhancement and there is no evidence for right axillary or  internal mammary chain adenopathy.     IMPRESSION:  1. Biopsy-proven malignancy in the left breast at the 10 o'clock  position in the posterior one-third that measures on the order of 2.8 cm  in greatest dimension with a centrally located bowtie shaped metallic  clip. No other suspicious findings are seen within the left breast and  there is no evidence for left axillary or internal mammary chain  adenopathy.  2. There are no findings suspicious for malignancy in the right breast.     BI-RADS category 6: Known malignancy.     This report was finalized on 12/28/2021 7:24 AM by Dr. Sixto Ramos M.D.     Patient has been scheduled for further recommendations. She has appointment with Dr. Beth Delong at 11 AM today.    January 17, 2022: Patient is s/p surgery left partial mastectomy with sentinel lymph node biopsy      Synoptic Checklist  INVASIVE CARCINOMA OF THE BREAST: Resection (INVASIVE CARCINOMA OF THE BREAST: COMPLETE EXCISION - All Specimens)  Specimen Laterality Left  .  TUMOR  Tumor Site Upper outer quadrant  Clock position  10 o'clock  Tumor Site Distance from nipple (Centimeters): 5 cm  Histologic Type Invasive carcinoma of no special type (ductal)  Histologic Grade ([place] Histologic Score)  Glandular (Acinar) / Tubular Differentiation Score 3  Nuclear Pleomorphism Score 2  Mitotic Rate Score  1  Overall Grade Grade 2 (scores of 6 or 7)  Tumor Size Greatest dimension of largest invasive focus (Millimeters): 30 mm  Tumor Focality Single focus of invasive carcinoma  Ductal Carcinoma In Situ (DCIS) Not identified  Lobular Carcinoma In Situ (LCIS) Not identified  Tumor Extent  Lymphovascular Invasion Not identified  Dermal Lymphovascular Invasion Not identified  Microcalcifications Present in invasive carcinoma  Present in non-neoplastic tissue  Treatment Effect in the Breast No known presurgical therapy  .  MARGINS  Margin Status for Invasive Carcinoma All margins negative for invasive carcinoma  Distance from Invasive Carcinoma to Closest Margin 12.8 mm  Closest Margin(s) to Invasive Carcinoma Inferior  Distance from Invasive Carcinoma to Posterior Margin 23 mm  Distance from Invasive Carcinoma to Superior Margin 13 mm  Distance from Invasive Carcinoma to Medial Margin  Comment  The patient's corresponding left breast core biopsy material is pulled and reviewed for comparison ([id number]). The  tumors appear similar; no discordance is noted.  Selected slides from this case are shared in internal consultation with Bam Campbell and  who concur.  [facility]/kds  .  REGIONAL LYMPH NODES  Regional Lymph Node Status All regional lymph nodes negative for tumor  Total Number of Lymph Nodes Examined (sentinel and non-sentinel) 1  Number of [place] Nodes Examined 1  .  PATHOLOGIC STAGE CLASSIFICATION (pTNM, AJCC 8th Edition)    pT Category pT2  Regional Lymph Nodes Modifier (sn): [place] node(s) evaluated.  pN Category pN0    ER %  CA %  HER-2/juan 1±  Ki-67 8%    Oncotype DX 14. No plans for chemo    We will start Femara    Past Medical History:   Diagnosis Date    Anesthesia complication     SLOW TO WAKE UP    Arthritis     Breast cancer 2021    Invasive ductal carcinoma of left breast     GERD (gastroesophageal reflux disease)     history    History of kidney cancer     S/P right nephrectomy in 1991     Hypertension     Tumor, thyroid     Type 2 diabetes mellitus         Past Surgical History:   Procedure Laterality Date    APPENDECTOMY      BACK SURGERY  1990    BREAST BIOPSY Right     BREAST LUMPECTOMY WITH SENTINEL NODE BIOPSY Left 01/17/2022    Procedure: Left ultrasound-guided partial mastectomy and sentinel lymph node biopsy;  Surgeon: Beth Delong MD;  Location: [facility];  Service: General;  Laterality: Left;    BREAST RECONSTRUCTION Bilateral 01/17/2022    Procedure: Right breast reduction/mastopexy, left breast oncoplastic closure, bilateral chest liposuction;  Surgeon: Simon Castro MD;  Location: [facility];  Service: Plastics;  Laterality: Bilateral;    BREAST RECONSTRUCTION Bilateral 10/26/2022    Procedure: BREAST RECONSTRUCTION REVISION with skin tailoring, fat graft and induration resection.;  Surgeon: Simon Castro MD;  Location: [facility];  Service: Plastics;  Laterality: Bilateral;    COLONOSCOPY      COLONOSCOPY W/ POLYPECTOMY N/A 9/19/2023    Procedure: COLONOSCOPY WITH POLYPECTOMY;  Surgeon: Sadia Lopez DO;  Location: [facility];  Service: Gastroenterology;  Laterality: N/A;  diverticulosis  right colon polyp x1 (hot snare)  sigmoid polyp x1 (hot snare)    FAT GRAFTING Bilateral 10/26/2022    Procedure: FAT GRAFTING;  Surgeon: Simon Castro MD;  Location: [facility];  Service: Plastics;  Laterality: Bilateral;    HYSTERECTOMY  1992    KNEE SURGERY Right 2001    RECONSTRUCTION    KNEE SURGERY Left 2003    RECONSTRUCTION    LASIK Bilateral     NEPHRECTOMY Right 1991    REDUCTION MAMMAPLASTY  2008    THYROID BIOPSY Bilateral 2010    TONSILLECTOMY          Current Outpatient Medications on File Prior to Visit   Medication Sig Dispense Refill    amLODIPine (NORVASC) 10 MG tablet Take 1 tablet by mouth Daily. 90 tablet 1    Blood Glucose Monitoring Suppl (OneTouch Verio Reflect) w/Device kit See Admin Instructions.      glucose blood test  strip 1 each by Other route Daily. 25 each 2    Lancets (freestyle) lancets 1 each by Other route Daily. 100 each 4    letrozole (FEMARA) 2.5 MG tablet TAKE 1 TABLET DAILY 90 tablet 1    metFORMIN ER (GLUCOPHAGE-XR) 500 MG 24 hr tablet Take 1 tablet by mouth 2 (Two) Times a Day With Meals. 360 tablet 1    rosuvastatin (Crestor) 5 MG tablet Take 1 tablet by mouth Daily. 90 tablet 1    Semaglutide, 2 MG/DOSE, (Ozempic, 2 MG/DOSE,) 8 MG/3ML solution pen-injector Inject 2 mg under the skin into the appropriate area as directed 1 (One) Time Per Week. 9 mL 0    valsartan (DIOVAN) 160 MG tablet Take 1 tablet by mouth Daily. 90 tablet 1     No current facility-administered medications on file prior to visit.        ALLERGIES:    Allergies   Allergen Reactions    Hydrocodone Anaphylaxis     PER PATIENT    Penicillins Itching        Social History     Socioeconomic History    Marital status:      Spouse name: Lucas    Number of children: 0    Highest education level: Bachelor's degree (e.g., BA, AB, BS)   Tobacco Use    Smoking status: Former     Current packs/day: 0.00     Average packs/day: 0.3 packs/day for 21.0 years (5.3 ttl pk-yrs)     Types: Cigarettes     Start date:      Quit date: 1991     Years since quittin.4     Passive exposure: Never    Smokeless tobacco: Never   Vaping Use    Vaping status: Never Used   Substance and Sexual Activity    Alcohol use: Not Currently    Drug use: Never    Sexual activity: Not Currently     Partners: Male     Birth control/protection: Post-menopausal, None        Family History   Problem Relation Age of Onset    Diabetes type II Mother     Hypertension Mother     Thyroid cancer Mother     Breast cancer Maternal Grandmother     Colon cancer Maternal Grandmother     Diabetes Maternal Grandmother     Stroke Maternal Grandmother     Alzheimer's disease Maternal Grandfather     Malig Hyperthermia Neg Hx         Review of Systems   Constitutional:  Positive for  fatigue. Negative for appetite change, chills, diaphoresis, fever and unexpected weight change.   HENT:  Negative for hearing loss, sore throat and trouble swallowing.    Respiratory:  Negative for cough, chest tightness, shortness of breath and wheezing.    Cardiovascular:  Negative for chest pain, palpitations and leg swelling.   Gastrointestinal:  Positive for nausea. Negative for abdominal distention, abdominal pain, constipation, diarrhea and vomiting.   Genitourinary:  Negative for dysuria, frequency, hematuria and urgency.   Musculoskeletal:  Negative for joint swelling.        No muscle weakness.   Skin:  Negative for rash and wound.   Neurological:  Positive for headaches. Negative for seizures, syncope, speech difficulty, weakness and numbness.   Hematological:  Negative for adenopathy. Does not bruise/bleed easily.   Psychiatric/Behavioral:  Positive for sleep disturbance. Negative for behavioral problems, confusion and suicidal ideas.    All other systems reviewed and are negative.  I have reviewed and confirmed the accuracy of the ROS as documented by the MA/LPN/RN Marisa King MD      Family history:  Mother had thyroid cancer. Grandmother had colon cancer and breast cancer on the maternal side and great grandmother had breast cancer on the maternal side. Patient has had thyroid nodule which was biopsied 10 years ago and benign. She had renal cell cancer s/p left nephrectomy.    OB/GYN history  Age of menstruation: 13 years  Age of menopause: 52+ years   0 para 0, no miscarriages  Patient was exposed to birth control pills only the posterior of her marriage and took it just for 1 year  Patient did not take any postmenopausal hormone replacement therapy  Patient had hysterectomy 30 years ago.    Social history: She lost her  just recently and is willing upset about that. She works as a  in [place]. She smoked just a few cigarettes for short very short  "time when she was in college. She does not drink alcohol. She has no kids.    Past medical history:  Hypertension, followed by her primary care  Diabetes mellitus for which she is on Metformin  Thyroid nodule which on biopsy was benign in the past    Objective     Vitals:    06/13/24 0944   BP: 132/83   Pulse: 72   Resp: 18   Temp: 98.3 °F (36.8 °C)   TempSrc: Oral   SpO2: 98%   Weight: 87.4 kg (192 lb 9.6 oz)   Height: 170.2 cm (67.01\")   PainSc:   6   PainLoc: Generalized  Comment: left wrist and Lower back             6/13/2024     9:41 AM   Current Status   ECOG score 0       Physical Exam  Chest:             June 13, 2024: 1 cm tender mass right above the nipple at 11 o'clock position on the left breast, no evidence of skin changes, no nipple discharge no evidence of any axillary adenopathy bilaterally and bilateral breast exam is negative except this mass which is questionable fat necrosis      CONSTITUTIONAL:  Vital signs reviewed.  No distress, looks comfortable.  EYES:  Conjunctivae and lids unremarkable.  PERRLA  EARS,NOSE,MOUTH,THROAT:  Ears and nose appear unremarkable.  Lips, teeth, gums appear unremarkable.  RESPIRATORY:  Normal respiratory effort.  Lungs clear to auscultation bilaterally.  BREAST: Right breast: No skin changes, no evidence of breast mass, no nipple discharge, no evidence of any right axillary adenopathy or right supraclavicular adenopathy  Left breast: No evidence of any skin changes, no evidence of any left breast mass and no evidence of left nipple discharge as well as no left axillary adenopathy or left supraclavicular adenopathy.  CARDIOVASCULAR:  Normal S1, S2.  No murmurs rubs or gallops.  No significant lower extremity edema.  GASTROINTESTINAL: Abdomen appears unremarkable.  Nontender.  No hepatomegaly.  No splenomegaly.  LYMPHATIC:  No cervical, supraclavicular, axillary lymphadenopathy.  SKIN:  Warm.  No rashes.  PSYCHIATRIC:  Normal judgment and insight.  Normal mood and " affect.    I have reexamined the patient and the results are consistent with the previously documented exam. Marisa King MD   RECENT LABS:  Hematology WBC   Date Value Ref Range Status   06/13/2024 7.09 3.40 - 10.80 10*3/mm3 Final   01/30/2024 7.6 3.4 - 10.8 x10E3/uL Final     RBC   Date Value Ref Range Status   06/13/2024 4.72 3.77 - 5.28 10*6/mm3 Final   01/30/2024 4.95 3.77 - 5.28 x10E6/uL Final     Hemoglobin   Date Value Ref Range Status   06/13/2024 14.3 12.0 - 15.9 g/dL Final     Hematocrit   Date Value Ref Range Status   06/13/2024 43.5 34.0 - 46.6 % Final     Platelets   Date Value Ref Range Status   06/13/2024 243 140 - 450 10*3/mm3 Final          Assessment & Plan     1.  T2 N0 M0, anatomic stage IIa , prognostic stage Ib invasive ductal carcinoma of the left breast at 10 o'clock position, grade 2, ER 91 x 100%, IA 91/100%, HER-2/juan 1+, Ki-67 8%.  No evidence of lymphovascular space invasion or perineural invasion  Patient has not had screening mammogram for 25 years  She felt a mass in September 2021 when she was holding the cat and noticed pain in the left breast upper inner quadrant and then she felt the mass, she was seen by her primary care physician  Diagnostic mammogram, November 10, 2021: Bilateral showed a 2.5 x 2.6 x 2.3 cm mass in the posterior one third upper inner quadrant of the left breast.  Internal coarse calcification and areas of fat necrosis were noted.  No other dominant no masses were seen.  There was no skin thickening, nipple retraction or axillary adenopathy  Ultrasound showed a 2.4 x 1.9 x 1.4 cm mass in the 10 o'clock position 5 cm from the nipple  December 2, 2021 ultrasound-guided left breast biopsy showed invasive ductal carcinoma, grade 2 with a Eldon score of 6, %, %, HER-2/juan 1+, Ki-67 8%  December 23, 2021: Patient underwent MRI of the breast which showed a 2.5 x 2.8 x 2.1 cm irregular enhancing mass at 10 o'clock position, 8 cm from the nipple.   There was no evidence of any left axillary or internal mammary chain lymphadenopathy.  Right breast was negative  I had a lengthy discussion with the patient about the nature of the breast cancer that was present.  We told her that was the most common type of breast cancer and given that it was ER/DE positive HER-2/juan negative without any lymphovascular or perineural invasion and Ki-67 of 8% it does not appear to be a highly aggressive disease.  He states that the lump did not increase in size since she first noticed it.  We then discussed about options of therapy of surgery upfront, followed by evaluation for Oncotype DX testing to see if she will be eligible for any chemotherapy and also will be eligible for endocrine therapy.  Patient is to see Dr. Beth Delong  today and we will await her input, I did discuss that likely the best option is for consideration of surgery upfront and will await to hear from Dr. Delong  However patient has family history of breast cancer in her maternal grandmother and great-grandmother and colon cancer in her maternal grandmother, thyroid cancer in her mother and personal history of renal cell cancer.  We then discussed about obtaining INVITAE genetic test today and has been ordered in our office for the 9 breast cancer gene with reflux to be 47 gene panel.  I told her surgical options will be discussed by Dr. Delong.  January 17, 2022 Left breast lumpectomy, grade 2 invasive ductal carcinoma measuring 30 mm with perineural invasion and microcalcifications identified adjacent to the clip and biopsy site change.  Focal minimal associated atypical ductal hyperplasia but no definite in situ carcinoma is identified all margins are free of tumor closest margin being 2.8 mm.  Incidental fibroadenoma 2.6 mm maximally with focal fat necrosis.  0 of 1 sentinel lymph node negative.  Right breast mammoplasty was benign  February 25, 2022: Patient already seen by radiation oncology with  plans to start radiation.  Oncotype DX testing shows score of 14, her benefit from chemo is less than 1%  February 2022: Started Femara   September 12, 2023: Continue Femara tolerating very well.  March 28, 2024: Currently on Femara but noticing worsening arthralgias and stiffness  June 13, 2024: Patient continues to have joint pains though improved after stopping Femara.  We discussed in length side effects of tamoxifen and patient willing to consider tamoxifen as it has less effect on the joints.  However she does have cystic mass in the left breast at 11 o'clock position 3 cm from the nipple.  She seems to think it is grown slightly and on exam I cannot palpate a tender mass which is 1.5 cm at 11 o'clock position on the left breast.  Ultrasound had shown possible benign fat necrosis but given clinical assessment of slight increase in her breast mass we will refer to Dr. Delong for opinion      2.  Family history of malignancy:  However patient has family history of breast cancer in her maternal grandmother and great-grandmother and colon cancer in her maternal grandmother, thyroid cancer in her mother and personal history of renal cell cancer.  We then discussed about obtaining [facility] genetic test today and has been ordered in our office for the 9 breast cancer gene with reflux to be 47 gene panel.  [facility] genetic test negative for 47 genes    3.  Hypertension currently on lisinopril followed by primary care physician.  Today mildly elevated blood pressure  Stable    4.  History of diabetes mellitus currently on Metformin    5.  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  History of renal cell cancer s/p left nephrectomy without evidence of recurrence and at age 31    7.  Osteopenia: Reviewed the bone density which is within normal  limits    Plan  Femara was discontinued secondary to severe arthralgias  Currently will start tamoxifen educated patient about side effects of tamoxifen in length and she understands to be active given the concern for clots and she is to let us know if there is any vaginal bleeding abnormally as there is rare chance for uterine cancer  Patient is dealing with a cystic mass in the left breast which she thinks has slightly increased and it was thought to be fat necrosis  Will get second opinion from Dr. Delong to see if this cyst needs to be aspirated and clinically it is palpable and tender  Follow-up with me, video visit in 6 weeks to assess her ability to tolerate tamoxifen  Follow-up with Dr. Henry in 3 months with labs    Monitoring high risk medication      I spent 40 total minutes, face-to-face, caring for Daysi today. Greater than 50% of this time involved counseling and/or coordination of care as documented within this note.    MD Dr. Zarina Hernandez Dr., Dr.

## 2024-06-14 ENCOUNTER — TELEPHONE (OUTPATIENT)
Dept: SURGERY | Facility: CLINIC | Age: 64
End: 2024-06-14
Payer: COMMERCIAL

## 2024-06-14 DIAGNOSIS — N63.20 MASS OF LEFT BREAST, UNSPECIFIED QUADRANT: Primary | ICD-10-CM

## 2024-06-14 NOTE — TELEPHONE ENCOUNTER
----- Message from Zane Kramer sent at 6/14/2024 12:56 PM EDT -----  Ok.  Jeanie set this up for me please  ----- Message -----  From: Beth Delong MD  Sent: 6/14/2024  12:45 PM EDT  To: LYNNE Cook; Jeanie Middleton MA    Please order diagnostic Left MMG & Limited Left US and then see her back sooner.  Has a very clear area of left fat necrosis in the upper left breast on mammogram in October 2023 and same area also looks like fat necrosis on ultrasound in 12/2023.  There is no need for aspiration or any intervention.  She has already had multiple surgeries and please explain that additional intervention will just increase the risk of additional fat necrosis.  ----- Message -----  From: Marisa King MD  Sent: 6/13/2024  10:27 AM EDT  To: Beth Delong MD    This patient has a palpable cystic mass in the left breast.  She had a last ultrasound in December 2023 which they thought was likely a cyst/fat necrosis.  However patient seems to think it is growing.  I have not done any imaging studies now but likely she will require an ultrasound in the office just to be sure that there is no worsening of this mass and that is not of concern.  Wanted to see if you could give an opinion on that lesion in the breast which is palpable to see if it needs to be aspirated or is it fat necrosis.  Patient concerned about it.  Thanks  Marisa King MD

## 2024-06-14 NOTE — TELEPHONE ENCOUNTER
Pt is scheduled on 07/18 @ 1130am and then she will see LYNNE Cook on 07/25 @ 120pm.  Spoke to pt and she stated understanding

## 2024-07-02 DIAGNOSIS — E11.9 TYPE 2 DIABETES MELLITUS WITHOUT COMPLICATION, WITHOUT LONG-TERM CURRENT USE OF INSULIN: ICD-10-CM

## 2024-07-03 RX ORDER — ROSUVASTATIN CALCIUM 5 MG/1
5 TABLET, COATED ORAL DAILY
Qty: 90 TABLET | Refills: 1 | Status: SHIPPED | OUTPATIENT
Start: 2024-07-03

## 2024-07-17 ENCOUNTER — TELEPHONE (OUTPATIENT)
Dept: SURGERY | Facility: CLINIC | Age: 64
End: 2024-07-17

## 2024-07-17 NOTE — TELEPHONE ENCOUNTER
"        Hub staff attempted to follow warm transfer process and was unsuccessful     Caller: Daysi Lugo \"CD\"     Relationship to patient: SELF     Best call back number: [phone]     Patient is needing:  PATIENT WOULD LIKE TO RESCHEDULE FOLLOW UP APPOINTMENT WITH LONNIE PUENTE ON  07.25.24  TO THE WEEK OF AUG 26TH           "

## 2024-07-18 ENCOUNTER — HOSPITAL ENCOUNTER (OUTPATIENT)
Dept: ULTRASOUND IMAGING | Facility: HOSPITAL | Age: 64
End: 2024-07-18
Payer: COMMERCIAL

## 2024-07-20 DIAGNOSIS — E11.9 TYPE 2 DIABETES MELLITUS WITHOUT COMPLICATION, WITHOUT LONG-TERM CURRENT USE OF INSULIN: ICD-10-CM

## 2024-07-22 RX ORDER — SEMAGLUTIDE 2.68 MG/ML
INJECTION, SOLUTION SUBCUTANEOUS
Qty: 9 ML | Refills: 3 | Status: SHIPPED | OUTPATIENT
Start: 2024-07-22

## 2024-07-26 ENCOUNTER — TELEMEDICINE (OUTPATIENT)
Dept: ONCOLOGY | Facility: CLINIC | Age: 64
End: 2024-07-26
Payer: COMMERCIAL

## 2024-07-26 DIAGNOSIS — Z17.0 MALIGNANT NEOPLASM OF UPPER-INNER QUADRANT OF LEFT BREAST IN FEMALE, ESTROGEN RECEPTOR POSITIVE: Primary | ICD-10-CM

## 2024-07-26 DIAGNOSIS — C50.212 MALIGNANT NEOPLASM OF UPPER-INNER QUADRANT OF LEFT BREAST IN FEMALE, ESTROGEN RECEPTOR POSITIVE: Primary | ICD-10-CM

## 2024-07-26 PROCEDURE — 99214 OFFICE O/P EST MOD 30 MIN: CPT | Performed by: INTERNAL MEDICINE

## 2024-07-26 RX ORDER — TAMOXIFEN CITRATE 20 MG/1
20 TABLET ORAL DAILY
Qty: 90 TABLET | Refills: 2 | Status: SHIPPED | OUTPATIENT
Start: 2024-07-26 | End: 2024-10-24

## 2024-07-26 NOTE — PROGRESS NOTES
Subjective     REASON FOR follow-up:     1.  PT2N0, anatomic stage IIa, prognostic stage Ia invasive ductal carcinoma of the left breast,  intermediate grade ER positive, OR positive HER-2/juan negative s/p left partial mastectomy with sentinel lymph node biopsy with oncoplastic closure and right reduction for symmetry on 2022.    Oncotype DX testin, no need for chemotherapy   Femara 2022, started Femara will start towards the end of radiation  2024: Patient had arthralgias secondary to Femara and that was discontinued.  Patient was started on tamoxifen on 2024:    2.  S/p right nephrectomy for renal cell carcinoma at age 31        History of Present Illness   Patient is a 62-year-old female with recently diagnosed stage IIa left breast cancer ER/OR positive HER-2/juan negative.  She underwent surgery as of 2022.      Patient's Oncotype DX is back. It is 14. She does not require any chemotherapy. She will require to start on endocrine therapy and being postmenopausal will start her on aromatase inhibitor. She also will require a DEXA scan to evaluate for osteopenia. She is healed up from surgery. She is going to start radiation. Patient is going to receive a total dose of 3990 cGy in 15 fractions followed by boost to the tumor bed 1000 cGy in 5 fractions.    Interval history: Patient is tolerating Femara well.  I reviewed the CT scan from  which shows hepatic steatosis.  Encouraged her on proper nutrition and decrease fried foods and fatty foods.  She does not have much arthralgias.  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  Encouraged her to focus on exercise.    She is to take calcium and vitamin D supplements.    2024: Patient currently on Femara.  She has issues with arthralgias and stiffness.    2024: Patient currently is off Femara and continues to have joint pains though improved.  Her  left wrist is still hurting.  There is reduction of joint pains by 25%.  Prior to starting Femara she never had this issue of joint pains.  We had discussed initially consideration of switching to Aromasin but given the fact that Aromasin can also cause joint pains and her joint pains are not completely resolved we discussed about trying tamoxifen.    Patient also had palpable mass in the left breast for which she underwent ultrasound back in December 2023.  They thought it was cyst.  Her screening mammogram in October 2023 was negative but because she had a palpable mass Dr. Blake had done a ultrasound of the left breast.Diagnostic mammogram was ordered but not done.  Ultrasound December 11, 2023 had shown at 11 o'clock position 3 cm from the nipple deep to the surgical scarring there is a 1.9 x 1.3 x 1 0.8 cm predominantly cystic mass which in the region of an area of benign appearing fat necrosis.  It was thought to be a palpable benign-appearing fat necrosis at 11:00 in the left breast.    However patient thinks it may have grown slightly and given the fact that it has grown I will have Dr. Delong examine her and see if this is still consistent with a fat necrosis/cyst versus any concern for biopsy.  I can feel the left breast mass which is about 1.5 cm right about the nipple around 11 o'clock position.  It is tender.  No skin changes    July 26, 2024: Patient is tolerating tamoxifen very well.  We switched it from March 2024 because of severe arthralgias due to aromatase inhibitor.  She is currently tolerating tamoxifen very well.  She does not have any leg swelling no vaginal bleeding.  No blood clots and she has not had any MRI or strokes.  Currently done well      Oncologic history:  patient is a 61-year-old female who is a  for [employer]. She recently back in September October 2021 noticed a mass in the left breast upper inner quadrant. She was playing with a cat and  noticed some pain in the breast at which time she felt the breast and felt this mass. She has had history of nipple discharge on and off for years in the left breast but that has been very minimal. There is no bloody discharge. She has not had mammogram for years, at least 25 years according to her. So when she felt the mass she went to her primary care physician Dr. Zarina Tineo who ordered a diagnostic mammogram and found in abnormality in the left breast at 10 o'clock position which was 2.6 x 2.5 x 2.3 cm. Subsequently she underwent a biopsy which was consistent with invasive ductal carcinoma ER/PA positive HER-2/juan negative.    She does have a family history of malignancy with her grandmother having: And breast cancer on her maternal side. Her maternal great-grandmother had breast cancer. Her mother had thyroid cancer. Patient has had renal cell cancer at age 31 and had to undergo left nephrectomy. She also had a thyroid nodule for which she was seen at [place] by physician. He had placed her on Synthroid and she had a very severe allergic reaction to Synthroid with swelling and he discontinued that. Patient was to be followed in a years time and has not gone.. But she has not had any issues with that    More recently she lost her  3 weeks ago as he was dealing with colon cancer. He was 70 years of age. He did not want any treatment. She had to take care of of him for the last 16 months and if no her symptoms. 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    Details are as follows    November 10, 2021: Bilateral diagnostic mammogram and ultrasound  FINDINGS: Bilateral digital CC and MLO mammographic and Tomosynthesis  images were obtained. No prior examination is available for comparison.  Scattered fibroglandular densities are seen throughout both breasts. In  the posterior one-third upper inner quadrant of the left breast at the  site  of palpable concern corresponding to the overlying triangular skin  marker, there is an irregular mass that measures on the order of 2.6 x  2.5 x 2.3 cm. Internal coarse calcifications and areas of fat necrosis  are noted. I see no other dominant masses in either breast. No areas of  architectural distortion are appreciated. There is no evidence for skin  thickening, nipple retraction or axillary adenopathy.     ULTRASOUND: Targeted sonographic evaluation of the left breast was  performed through the area of palpable concern which corresponds to the  10-o'clock position on the order of 5 cm from the nipple. At this  location, there is an irregular hypoechoic mass with internal  calcifications. The mass measures 2.4 x 1.9 x 1.4 cm. No detectable  internal vascularity is appreciated. Posterior acoustic shadowing is  noted.     IMPRESSION:  1. There is an irregular mass in the left breast at the 10-o'clock  position corresponding to the site of palpable concern. Sonographically,  the mass measures on the order of 2.4 cm in greatest dimension.  Correlation with an ultrasound-guided left breast biopsy is recommended.  2. There are no findings suspicious for malignancy in the right breast.     November 9, 2021: Ultrasound-guided left breast biopsy showed    Final Diagnosis   1. Left Breast, 10 O'clock, 5 cm from Nipple, Ultrasound-Guided Biopsies for a Mass: INVASIVE MAMMARY                CARCINOMA, NO SPECIAL TYPE (INVASIVE DUCTAL CARCINOMA).               A. Histologic grade: [place] histologic score II (tubules = 3, nuclei = 2, mitosis = 1).               B. Largest contiguous invasive focus measures 10 mm in a core.               C. No definitive in situ component identified.                       D. No lymphovascular nor perineural invasion identified.     Estrogen receptor: %  Progesterone receptor: %  HER-2/juan: 1+, negative  Ki-67: 8%    December 28, 2021: MRI of the breast    FINDINGS:Scattered  fibroglandular tissue is seen throughout both  breasts. Minimal background parenchymal enhancement of both breasts is  noted.     Within the posterior one-third upper inner quadrant of the left breast  centered at the 10 o'clock position on the order of 8 cm from nipple  there is an irregular enhancing mass that measures on the order of 2.5  cm in anterior to posterior dimension, 2.8 cm in the mediolateral  dimension and 2.1 cm in the superior-inferior dimension. A signal void  is seen within the mass that corresponds to a bowtie-shaped metallic  clip. This represents the biopsy-proven site of malignancy.     No other areas of abnormal enhancement or morphology are seen within the  left breast. I see no evidence for abnormal left breast skin, nipple or  chest wall enhancement.  There is no evidence for left axillary or  internal mammary chain adenopathy.     There are no areas of abnormal enhancement or morphology in the right  breast. I see no evidence for abnormal right breast skin, nipple or  chest wall enhancement and there is no evidence for right axillary or  internal mammary chain adenopathy.     IMPRESSION:  1. Biopsy-proven malignancy in the left breast at the 10 o'clock  position in the posterior one-third that measures on the order of 2.8 cm  in greatest dimension with a centrally located bowtie shaped metallic  clip. No other suspicious findings are seen within the left breast and  there is no evidence for left axillary or internal mammary chain  adenopathy.  2. There are no findings suspicious for malignancy in the right breast.     BI-RADS category 6: Known malignancy.     This report was finalized on 12/28/2021 7:24 AM by Dr. Sixto Ramos M.D.     Patient has been scheduled for further recommendations. She has appointment with Dr. Beth Delong at 11 AM today.    January 17, 2022: Patient is s/p surgery left partial mastectomy with sentinel lymph node biopsy      Synoptic Checklist  INVASIVE  CARCINOMA OF THE BREAST: Resection (INVASIVE CARCINOMA OF THE BREAST: COMPLETE EXCISION - All Specimens)  Specimen Laterality Left  .  TUMOR  Tumor Site Upper outer quadrant  Clock position  10 o'clock  Tumor Site Distance from nipple (Centimeters): 5 cm  Histologic Type Invasive carcinoma of no special type (ductal)  Histologic Grade ([place] Histologic Score)  Glandular (Acinar) / Tubular Differentiation Score 3  Nuclear Pleomorphism Score 2  Mitotic Rate Score 1  Overall Grade Grade 2 (scores of 6 or 7)  Tumor Size Greatest dimension of largest invasive focus (Millimeters): 30 mm  Tumor Focality Single focus of invasive carcinoma  Ductal Carcinoma In Situ (DCIS) Not identified  Lobular Carcinoma In Situ (LCIS) Not identified  Tumor Extent  Lymphovascular Invasion Not identified  Dermal Lymphovascular Invasion Not identified  Microcalcifications Present in invasive carcinoma  Present in non-neoplastic tissue  Treatment Effect in the Breast No known presurgical therapy  .  MARGINS  Margin Status for Invasive Carcinoma All margins negative for invasive carcinoma  Distance from Invasive Carcinoma to Closest Margin 12.8 mm  Closest Margin(s) to Invasive Carcinoma Inferior  Distance from Invasive Carcinoma to Posterior Margin 23 mm  Distance from Invasive Carcinoma to Superior Margin 13 mm  Distance from Invasive Carcinoma to Medial Margin  Comment  The patient's corresponding left breast core biopsy material is pulled and reviewed for comparison ([id number]). The  tumors appear similar; no discordance is noted.  Selected slides from this case are shared in internal consultation with Bam Campbell and  who concur.  [facility]/kds  .  REGIONAL LYMPH NODES  Regional Lymph Node Status All regional lymph nodes negative for tumor  Total Number of Lymph Nodes Examined (sentinel and non-sentinel) 1  Number of [place] Nodes Examined 1  .  PATHOLOGIC STAGE CLASSIFICATION (pTNM, AJCC 8th Edition)    pT Category pT2  Regional  Lymph Nodes Modifier (sn): [place] node(s) evaluated.  pN Category pN0    ER %  MO %  HER-2/juan 1±  Ki-67 8%    Oncotype DX 14. No plans for chemo    We will start Femara    Past Medical History:   Diagnosis Date    Anesthesia complication     SLOW TO WAKE UP    Arthritis     Breast cancer 2021    Invasive ductal carcinoma of left breast     GERD (gastroesophageal reflux disease)     history    History of kidney cancer     S/P right nephrectomy in 1991    Hypertension     Tumor, thyroid     Type 2 diabetes mellitus         Past Surgical History:   Procedure Laterality Date    APPENDECTOMY      BACK SURGERY  1990    BREAST BIOPSY Right     BREAST LUMPECTOMY WITH SENTINEL NODE BIOPSY Left 01/17/2022    Procedure: Left ultrasound-guided partial mastectomy and sentinel lymph node biopsy;  Surgeon: Beth Delong MD;  Location: [facility];  Service: General;  Laterality: Left;    BREAST RECONSTRUCTION Bilateral 01/17/2022    Procedure: Right breast reduction/mastopexy, left breast oncoplastic closure, bilateral chest liposuction;  Surgeon: Simon Castro MD;  Location: [facility];  Service: Plastics;  Laterality: Bilateral;    BREAST RECONSTRUCTION Bilateral 10/26/2022    Procedure: BREAST RECONSTRUCTION REVISION with skin tailoring, fat graft and induration resection.;  Surgeon: Simon Castro MD;  Location: [facility];  Service: Plastics;  Laterality: Bilateral;    COLONOSCOPY      COLONOSCOPY W/ POLYPECTOMY N/A 9/19/2023    Procedure: COLONOSCOPY WITH POLYPECTOMY;  Surgeon: Sadia Lopez DO;  Location: [facility];  Service: Gastroenterology;  Laterality: N/A;  diverticulosis  right colon polyp x1 (hot snare)  sigmoid polyp x1 (hot snare)    FAT GRAFTING Bilateral 10/26/2022    Procedure: FAT GRAFTING;  Surgeon: Simon Castro MD;  Location: [facility];  Service: Plastics;  Laterality: Bilateral;    HYSTERECTOMY  1992    KNEE SURGERY Right 2001     RECONSTRUCTION    KNEE SURGERY Left 2003    RECONSTRUCTION    LASIK Bilateral     NEPHRECTOMY Right 1991    REDUCTION MAMMAPLASTY  2008    THYROID BIOPSY Bilateral 2010    TONSILLECTOMY          Current Outpatient Medications on File Prior to Visit   Medication Sig Dispense Refill    amLODIPine (NORVASC) 10 MG tablet Take 1 tablet by mouth Daily. 90 tablet 1    Blood Glucose Monitoring Suppl (OneTouch Verio Reflect) w/Device kit See Admin Instructions.      glucose blood test strip 1 each by Other route Daily. 25 each 2    Lancets (freestyle) lancets 1 each by Other route Daily. 100 each 4    metFORMIN ER (GLUCOPHAGE-XR) 500 MG 24 hr tablet Take 1 tablet by mouth 2 (Two) Times a Day With Meals. 360 tablet 1    Ozempic, 2 MG/DOSE, 8 MG/3ML solution pen-injector INJECT 2 MG UNDER THE SKIN INTO THE APPROPRIATE AREA AS DIRECTED WEEKLY 9 mL 3    rosuvastatin (Crestor) 5 MG tablet Take 1 tablet by mouth Daily. 90 tablet 1    valsartan (DIOVAN) 160 MG tablet Take 1 tablet by mouth Daily. 90 tablet 1    [DISCONTINUED] letrozole (FEMARA) 2.5 MG tablet TAKE 1 TABLET DAILY 90 tablet 1     No current facility-administered medications on file prior to visit.        ALLERGIES:    Allergies   Allergen Reactions    Hydrocodone Anaphylaxis     PER PATIENT    Penicillins Itching        Social History     Socioeconomic History    Marital status:      Spouse name: Lucas    Number of children: 0    Highest education level: Bachelor's degree (e.g., BA, AB, BS)   Tobacco Use    Smoking status: Former     Current packs/day: 0.00     Average packs/day: 0.3 packs/day for 21.0 years (5.3 ttl pk-yrs)     Types: Cigarettes     Start date:      Quit date: 1991     Years since quittin.5     Passive exposure: Never    Smokeless tobacco: Never   Vaping Use    Vaping status: Never Used   Substance and Sexual Activity    Alcohol use: Not Currently    Drug use: Never    Sexual activity: Not Currently     Partners: Male     Birth  control/protection: Post-menopausal, None        Family History   Problem Relation Age of Onset    Diabetes type II Mother     Hypertension Mother     Thyroid cancer Mother     Breast cancer Maternal Grandmother     Colon cancer Maternal Grandmother     Diabetes Maternal Grandmother     Stroke Maternal Grandmother     Alzheimer's disease Maternal Grandfather     Malig Hyperthermia Neg Hx         Review of Systems   Constitutional:  Positive for fatigue. Negative for appetite change, chills, diaphoresis, fever and unexpected weight change.   HENT:  Negative for hearing loss, sore throat and trouble swallowing.    Respiratory:  Negative for cough, chest tightness, shortness of breath and wheezing.    Cardiovascular:  Negative for chest pain, palpitations and leg swelling.   Gastrointestinal:  Positive for nausea. Negative for abdominal distention, abdominal pain, constipation, diarrhea and vomiting.   Genitourinary:  Negative for dysuria, frequency, hematuria and urgency.   Musculoskeletal:  Negative for joint swelling.        No muscle weakness.   Skin:  Negative for rash and wound.   Neurological:  Positive for headaches. Negative for seizures, syncope, speech difficulty, weakness and numbness.   Hematological:  Negative for adenopathy. Does not bruise/bleed easily.   Psychiatric/Behavioral:  Positive for sleep disturbance. Negative for behavioral problems, confusion and suicidal ideas.    All other systems reviewed and are negative.  I have reviewed and confirmed the accuracy of the ROS as documented by the MA/LPN/RN Marisa King MD      Family history:  Mother had thyroid cancer. Grandmother had colon cancer and breast cancer on the maternal side and great grandmother had breast cancer on the maternal side. Patient has had thyroid nodule which was biopsied 10 years ago and benign. She had renal cell cancer s/p left nephrectomy.    OB/GYN history  Age of menstruation: 13 years  Age of menopause: 50+  years   0 para 0, no miscarriages  Patient was exposed to birth control pills only the posterior of her marriage and took it just for 1 year  Patient did not take any postmenopausal hormone replacement therapy  Patient had hysterectomy 30 years ago.    Social history: She lost her  just recently and is willing upset about that. She works as a  in [employer]. She smoked just a few cigarettes for short very short time when she was in college. She does not drink alcohol. She has no kids.    Past medical history:  Hypertension, followed by her primary care  Diabetes mellitus for which she is on Metformin  Thyroid nodule which on biopsy was benign in the past    Objective     Vitals:    24 1131   PainSc: 0-No pain               2024    11:27 AM   Current Status   ECOG score 0       Physical Exam  Chest:             2024: 1 cm tender mass right above the nipple at 11 o'clock position on the left breast, no evidence of skin changes, no nipple discharge no evidence of any axillary adenopathy bilaterally and bilateral breast exam is negative except this mass which is questionable fat necrosis      CONSTITUTIONAL:  Vital signs reviewed.  No distress, looks comfortable.  EYES:  Conjunctivae and lids unremarkable.  PERRLA  EARS,NOSE,MOUTH,THROAT:  Ears and nose appear unremarkable.  Lips, teeth, gums appear unremarkable.  RESPIRATORY:  Normal respiratory effort.  Lungs clear to auscultation bilaterally.  BREAST: Right breast: No skin changes, no evidence of breast mass, no nipple discharge, no evidence of any right axillary adenopathy or right supraclavicular adenopathy  Left breast: No evidence of any skin changes, no evidence of any left breast mass and no evidence of left nipple discharge as well as no left axillary adenopathy or left supraclavicular adenopathy.  CARDIOVASCULAR:  Normal S1, S2.  No murmurs rubs or gallops.  No significant lower extremity  edema.  GASTROINTESTINAL: Abdomen appears unremarkable.  Nontender.  No hepatomegaly.  No splenomegaly.  LYMPHATIC:  No cervical, supraclavicular, axillary lymphadenopathy.  SKIN:  Warm.  No rashes.  PSYCHIATRIC:  Normal judgment and insight.  Normal mood and affect.    I have reexamined the patient and the results are consistent with the previously documented exam. Marisa King MD   RECENT LABS:  Hematology WBC   Date Value Ref Range Status   06/13/2024 7.09 3.40 - 10.80 10*3/mm3 Final   01/30/2024 7.6 3.4 - 10.8 x10E3/uL Final     RBC   Date Value Ref Range Status   06/13/2024 4.72 3.77 - 5.28 10*6/mm3 Final   01/30/2024 4.95 3.77 - 5.28 x10E6/uL Final     Hemoglobin   Date Value Ref Range Status   06/13/2024 14.3 12.0 - 15.9 g/dL Final     Hematocrit   Date Value Ref Range Status   06/13/2024 43.5 34.0 - 46.6 % Final     Platelets   Date Value Ref Range Status   06/13/2024 243 140 - 450 10*3/mm3 Final          Assessment & Plan     1.  T2 N0 M0, anatomic stage IIa , prognostic stage Ib invasive ductal carcinoma of the left breast at 10 o'clock position, grade 2, ER 91 x 100%, OR 91/100%, HER-2/juan 1+, Ki-67 8%.  No evidence of lymphovascular space invasion or perineural invasion  Patient has not had screening mammogram for 25 years  She felt a mass in September 2021 when she was holding the cat and noticed pain in the left breast upper inner quadrant and then she felt the mass, she was seen by her primary care physician  Diagnostic mammogram, November 10, 2021: Bilateral showed a 2.5 x 2.6 x 2.3 cm mass in the posterior one third upper inner quadrant of the left breast.  Internal coarse calcification and areas of fat necrosis were noted.  No other dominant no masses were seen.  There was no skin thickening, nipple retraction or axillary adenopathy  Ultrasound showed a 2.4 x 1.9 x 1.4 cm mass in the 10 o'clock position 5 cm from the nipple  December 2, 2021 ultrasound-guided left breast biopsy showed invasive  ductal carcinoma, grade 2 with a [place] score of 6, %, %, HER-2/juan 1+, Ki-67 8%  December 23, 2021: Patient underwent MRI of the breast which showed a 2.5 x 2.8 x 2.1 cm irregular enhancing mass at 10 o'clock position, 8 cm from the nipple.  There was no evidence of any left axillary or internal mammary chain lymphadenopathy.  Right breast was negative  I had a lengthy discussion with the patient about the nature of the breast cancer that was present.  We told her that was the most common type of breast cancer and given that it was ER/OR positive HER-2/juan negative without any lymphovascular or perineural invasion and Ki-67 of 8% it does not appear to be a highly aggressive disease.  He states that the lump did not increase in size since she first noticed it.  We then discussed about options of therapy of surgery upfront, followed by evaluation for Oncotype DX testing to see if she will be eligible for any chemotherapy and also will be eligible for endocrine therapy.  Patient is to see Dr. Beth Delong  today and we will await her input, I did discuss that likely the best option is for consideration of surgery upfront and will await to hear from Dr. Delong  However patient has family history of breast cancer in her maternal grandmother and great-grandmother and colon cancer in her maternal grandmother, thyroid cancer in her mother and personal history of renal cell cancer.  We then discussed about obtaining INVITAE genetic test today and has been ordered in our office for the 9 breast cancer gene with reflux to be 47 gene panel.  I told her surgical options will be discussed by Dr. Delong.  January 17, 2022 Left breast lumpectomy, grade 2 invasive ductal carcinoma measuring 30 mm with perineural invasion and microcalcifications identified adjacent to the clip and biopsy site change.  Focal minimal associated atypical ductal hyperplasia but no definite in situ carcinoma is identified all margins  are free of tumor closest margin being 2.8 mm.  Incidental fibroadenoma 2.6 mm maximally with focal fat necrosis.  0 of 1 sentinel lymph node negative.  Right breast mammoplasty was benign  February 25, 2022: Patient already seen by radiation oncology with plans to start radiation.  Oncotype DX testing shows score of 14, her benefit from chemo is less than 1%  February 2022: Started Femara   September 12, 2023: Continue Femara tolerating very well.  March 28, 2024: Currently on Femara but noticing worsening arthralgias and stiffness  June 13, 2024: Patient continues to have joint pains though improved after stopping Femara.  We discussed in length side effects of tamoxifen and patient willing to consider tamoxifen as it has less effect on the joints.  However she does have cystic mass in the left breast at 11 o'clock position 3 cm from the nipple.  She seems to think it is grown slightly and on exam I cannot palpate a tender mass which is 1.5 cm at 11 o'clock position on the left breast.  Ultrasound had shown possible benign fat necrosis but given clinical assessment of slight increase in her breast mass we will refer to Dr. Delong for opinion  Patient started tamoxifen since March 2024 after we discontinued aromatase inhibitor secondary to arthralgias  July 26, 2024: Tolerating tamoxifen      2.  Family history of malignancy:  However patient has family history of breast cancer in her maternal grandmother and great-grandmother and colon cancer in her maternal grandmother, thyroid cancer in her mother and personal history of renal cell cancer.  We then discussed about obtaining [facility] genetic test today and has been ordered in our office for the 9 breast cancer gene with reflux to be 47 gene panel.  IN[facility] genetic test negative for 47 genes    3.  Hypertension currently on lisinopril followed by primary care physician.  Today mildly elevated blood pressure  Stable    4.  History of diabetes mellitus currently on  Metformin    5.  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  History of renal cell cancer s/p left nephrectomy without evidence of recurrence and at age 31    7.  Osteopenia: Reviewed the bone density which is within normal limits    Plan  Femara was discontinued secondary to severe arthralgias  Currently on tamoxifen and tolerating very well  Continue calcium and vitamin D supplements  Continue exercise and proper nutrition follow-up  Follow-up with Dr. Henry in 3 months with labs    Monitoring high risk medication      I spent 40 total minutes, face-to-face, caring for Daysi today. Greater than 50% of this time involved counseling and/or coordination of care as documented within this note.    MD Dr. Zarina Hernandez Dr., Dr.

## 2024-08-01 ENCOUNTER — OFFICE VISIT (OUTPATIENT)
Dept: FAMILY MEDICINE CLINIC | Facility: CLINIC | Age: 64
End: 2024-08-01
Payer: COMMERCIAL

## 2024-08-01 VITALS
HEART RATE: 74 BPM | BODY MASS INDEX: 29.73 KG/M2 | DIASTOLIC BLOOD PRESSURE: 79 MMHG | SYSTOLIC BLOOD PRESSURE: 124 MMHG | WEIGHT: 189.4 LBS | OXYGEN SATURATION: 95 % | HEIGHT: 67 IN

## 2024-08-01 DIAGNOSIS — E11.9 TYPE 2 DIABETES MELLITUS WITHOUT COMPLICATION, WITHOUT LONG-TERM CURRENT USE OF INSULIN: Primary | ICD-10-CM

## 2024-08-01 DIAGNOSIS — Z79.899 ENCOUNTER FOR LONG-TERM (CURRENT) USE OF MEDICATIONS: ICD-10-CM

## 2024-08-01 DIAGNOSIS — E04.2 MULTIPLE THYROID NODULES: ICD-10-CM

## 2024-08-01 DIAGNOSIS — R80.9 PROTEINURIA, UNSPECIFIED TYPE: ICD-10-CM

## 2024-08-01 DIAGNOSIS — E78.2 MIXED HYPERLIPIDEMIA: ICD-10-CM

## 2024-08-01 DIAGNOSIS — I10 PRIMARY HYPERTENSION: ICD-10-CM

## 2024-08-01 LAB
EXPIRATION DATE: ABNORMAL
HBA1C MFR BLD: 6.2 % (ref 4.5–5.7)
Lab: ABNORMAL

## 2024-08-01 PROCEDURE — 99214 OFFICE O/P EST MOD 30 MIN: CPT | Performed by: FAMILY MEDICINE

## 2024-08-01 PROCEDURE — 83036 HEMOGLOBIN GLYCOSYLATED A1C: CPT | Performed by: FAMILY MEDICINE

## 2024-08-01 RX ORDER — METFORMIN HYDROCHLORIDE 500 MG/1
500 TABLET, EXTENDED RELEASE ORAL 2 TIMES DAILY WITH MEALS
Qty: 360 TABLET | Refills: 1 | Status: SHIPPED | OUTPATIENT
Start: 2024-08-01

## 2024-08-01 RX ORDER — AMLODIPINE BESYLATE 10 MG/1
10 TABLET ORAL DAILY
Qty: 90 TABLET | Refills: 1 | Status: SHIPPED | OUTPATIENT
Start: 2024-08-01

## 2024-08-01 RX ORDER — SEMAGLUTIDE 2.68 MG/ML
2 INJECTION, SOLUTION SUBCUTANEOUS WEEKLY
Qty: 9 ML | Refills: 3 | Status: SHIPPED | OUTPATIENT
Start: 2024-08-01

## 2024-08-01 RX ORDER — VALSARTAN 160 MG/1
160 TABLET ORAL DAILY
Qty: 90 TABLET | Refills: 1 | Status: SHIPPED | OUTPATIENT
Start: 2024-08-01

## 2024-08-01 RX ORDER — ROSUVASTATIN CALCIUM 5 MG/1
5 TABLET, COATED ORAL DAILY
Qty: 90 TABLET | Refills: 1 | Status: SHIPPED | OUTPATIENT
Start: 2024-08-01

## 2024-08-01 NOTE — PROGRESS NOTES
"Chief Complaint  Diabetes and Hypertension    Subjective        Daysi Lugo presents to [facility]  History of Present Illness  Patient is here today to follow-up on her chronic health conditions:    Hypertension.  She is currently taking valsartan 160 mg daily and amlodipine 10 mg daily.  Patient states that she has no side effects. BP are well controlled.    Type 2 diabetes.  She was diagnosed by a A1c of 8.0 on September 23, 2021.  She is taking metformin 1000mg BID and Ozempic 2 mg weekly. She is tolerating the medication well and denies any side effects.      Thyromegaly.  The patient has had a history of prior thyroid nodules.  She was also started on Synthroid in the past and had an allergic reaction to it so she never went back to the doctor.  Her mother has a history of thyroid cancer, unsure of what type.  Ultrasound performed March 2023 showed an enlarged gland with multiple thyroid nodules (triads 3).  No new symptoms.  Yearly surveillance ultrasound is due again in May 2025  Diabetes  She has type 2 diabetes mellitus. No MedicAlert identification noted. The initial diagnosis of diabetes was made 2 years ago. Pertinent negatives for hypoglycemia include no confusion, headaches, speech difficulty, sweats or tremors. Associated symptoms include foot paresthesias and polyuria. Pertinent negatives for diabetes include no blurred vision, no foot ulcerations, no polydipsia and no weight loss. Pertinent negatives for hypoglycemia complications include no blackouts, no hospitalization, no nocturnal hypoglycemia, no required assistance and no required glucagon injection. Symptoms are stable. She is compliant with treatment all of the time. She monitors blood glucose at home 1-2 x per day. Her highest blood glucose is 130-140 mg/dl. She does not see a podiatrist. Eye exam is not current.       Objective   Vital Signs:  /79   Pulse 74   Ht 170.2 cm (67.01\")   Wt 85.9 kg " "(189 lb 6.4 oz)   SpO2 95%   BMI 29.66 kg/m²   Estimated body mass index is 29.66 kg/m² as calculated from the following:    Height as of this encounter: 170.2 cm (67.01\").    Weight as of this encounter: 85.9 kg (189 lb 6.4 oz).            Physical Exam  Vitals and nursing note reviewed.   Constitutional:       Appearance: Normal appearance. She is well-developed.   HENT:      Head: Normocephalic and atraumatic.   Eyes:      General: No scleral icterus.     Conjunctiva/sclera: Conjunctivae normal.   Cardiovascular:      Rate and Rhythm: Normal rate and regular rhythm.      Heart sounds: Normal heart sounds.   Pulmonary:      Effort: Pulmonary effort is normal.      Breath sounds: Normal breath sounds.   Musculoskeletal:      Cervical back: Normal range of motion and neck supple.      Right lower leg: No edema.      Left lower leg: No edema.   Skin:     General: Skin is warm and dry.      Findings: No rash.   Neurological:      Mental Status: She is alert and oriented to person, place, and time.   Psychiatric:         Mood and Affect: Mood normal.         Behavior: Behavior normal.         Thought Content: Thought content normal.         Judgment: Judgment normal.        Result Review :    The following data was reviewed by: Zarina Tineo DO on 08/01/2024:  Common labs          5/1/2024    09:42 5/1/2024    10:33 6/13/2024    09:25 8/1/2024    09:54   Common Labs   Glucose   132     BUN   14     Creatinine   0.81     Sodium   141     Potassium   4.7     Chloride   102     Calcium   9.8     Albumin   4.5     Total Bilirubin   0.5     Alkaline Phosphatase   127     AST (SGOT)   19     ALT (SGPT)   20     WBC   7.09     Hemoglobin   14.3     Hematocrit   43.5     Platelets   243     Hemoglobin A1C 7.3    6.2    Uric Acid  6.2        TSH          10/18/2023    10:38 1/30/2024    10:37 5/1/2024    10:33   TSH   TSH 3.270  2.930  3.150                   Assessment and Plan     Diagnoses and all orders for this " visit:    1. Type 2 diabetes mellitus without complication, without long-term current use of insulin (Primary)  -     POC Glycosylated Hemoglobin (Hb A1C)  -     rosuvastatin (Crestor) 5 MG tablet; Take 1 tablet by mouth Daily.  Dispense: 90 tablet; Refill: 1  -     Semaglutide, 2 MG/DOSE, (Ozempic, 2 MG/DOSE,) 8 MG/3ML solution pen-injector; Inject 2 mg under the skin into the appropriate area as directed 1 (One) Time Per Week.  Dispense: 9 mL; Refill: 3  -     metFORMIN ER (GLUCOPHAGE-XR) 500 MG 24 hr tablet; Take 1 tablet by mouth 2 (Two) Times a Day With Meals.  Dispense: 360 tablet; Refill: 1    2. Primary hypertension  -     valsartan (DIOVAN) 160 MG tablet; Take 1 tablet by mouth Daily.  Dispense: 90 tablet; Refill: 1  -     amLODIPine (NORVASC) 10 MG tablet; Take 1 tablet by mouth Daily.  Dispense: 90 tablet; Refill: 1    3. Mixed hyperlipidemia    4. Multiple thyroid nodules    5. Encounter for long-term (current) use of medications    6. Proteinuria, unspecified type  -     valsartan (DIOVAN) 160 MG tablet; Take 1 tablet by mouth Daily.  Dispense: 90 tablet; Refill: 1    Patient is here today to follow-up on chronic stable type 2 diabetes, hypertension, hyperlipidemia, and thyroid nodules.  No medication changes will be made.  Refills provided.  Follow-up in 6 months with a physical         Follow Up     Return in 29 weeks (on 2/20/2025) for Annual physical, HTN, Diabetes.  Patient was given instructions and counseling regarding her condition or for health maintenance advice. Please see specific information pulled into the AVS if appropriate.         Answers submitted by the patient for this visit:  Primary Reason for Visit (Submitted on 7/30/2024)  What is the primary reason for your visit?: Diabetes  Diabetes Questionnaire (Submitted on 7/30/2024)  Chief Complaint: Diabetes problem  Below 70: never

## 2024-08-01 NOTE — Clinical Note
Patient had a colonoscopy in September 2023 by Dr. Lopez that is not satisfying the metric.  Can you correct that please?

## 2024-08-09 ENCOUNTER — TELEPHONE (OUTPATIENT)
Dept: PLASTIC SURGERY | Facility: CLINIC | Age: 64
End: 2024-08-09
Payer: COMMERCIAL

## 2024-08-09 NOTE — TELEPHONE ENCOUNTER
Pt called to cancel her surgery in November and will call back at the first of 2025 to r/s.  
Inez has a heart murmur on exam, which sounds like an "innocent", or harmless, heart murmur.     She should see pediatric cardiology in clinic to assess the murmur more thoroughly.   [phone]      Anaphylaxis in Children    Your child was seen today in the Emergency Department for an anaphylaxis episode.  Anaphylaxis is a life-threatening allergic reaction that must be treated immediately with an injection of epinephrine.    Your child's allergic reaction is called “anaphylaxis” if two (2) body systems are involved. These symptoms can include:  -Tight, swollen throat or difficulty swallowing or speaking  -Swollen lips or tongue  -Difficulty breathing, shortness of breath, cough, or wheeze  -Abdominal cramps, nausea, vomiting, or diarrhea  -Skin rash, hives, swelling, or itching  -Feeling dizzy, lightheaded, confused, or faint    General tips for taking care of a child who had anaphylaxis:  -Continue to take medications prescribed to you from the Emergency Department.  -There is a chance your child's anaphylaxis will occur again, even after they leave the ER.  If this is the case, give epinephrine at home and return to the Emergency Department immediately.      If the trigger for your child's anaphylaxis was identified at this visit, avoid it completely. If the trigger was not identified, avoid all potential options for now. You and your child's pediatrician can consider allergy testing in the future (once this reaction is out of their system) to help identify it.  Contact your child's healthcare provider if you have questions or concerns about your child's condition or care.    Follow up with your pediatrician in 1-2 days to make sure that your child is doing better.    If your child has another episode of anaphylaxis, follow these instructions:  1.	Immediately give 1 shot of epinephrine into the outer thigh muscle of either leg.  This is ideally given right into the exposed skin, but it is okay to inject epinephrine through clothing. Just be careful to avoid seams, zippers, or other parts that can prevent the needle from entering the skin.  2.	Leave the shot in place for 10 seconds before you remove it. This helps make sure all of the epinephrine is delivered.  3.	Call 9-1-1 to go to the Emergency Department, even if the shot improved symptoms.   4.	If symptoms do not improve within 20 minutes, give the 2nd shot of epinephrine.

## 2024-08-21 ENCOUNTER — HOSPITAL ENCOUNTER (OUTPATIENT)
Dept: MAMMOGRAPHY | Facility: HOSPITAL | Age: 64
Discharge: HOME | End: 2024-08-21
Payer: COMMERCIAL

## 2024-08-21 ENCOUNTER — HOSPITAL ENCOUNTER (OUTPATIENT)
Dept: ULTRASOUND IMAGING | Facility: HOSPITAL | Age: 64
Discharge: HOME | End: 2024-08-21
Payer: COMMERCIAL

## 2024-08-21 DIAGNOSIS — N63.20 MASS OF LEFT BREAST, UNSPECIFIED QUADRANT: ICD-10-CM

## 2024-08-21 PROCEDURE — 76642 ULTRASOUND BREAST LIMITED: CPT

## 2024-08-21 PROCEDURE — 77065 DX MAMMO INCL CAD UNI: CPT

## 2024-08-21 PROCEDURE — G0279 TOMOSYNTHESIS, MAMMO: HCPCS

## 2024-09-12 NOTE — TELEPHONE ENCOUNTER
LOV 3/29/2023   NEXT APPT 7/17/2023    [Time Spent: ___ minutes] : I have spent [unfilled] minutes of time on the encounter which excludes teaching and separately reported services.

## 2024-10-17 NOTE — PROGRESS NOTES
Subjective     10/21/2024, interval history:  Patient new to me.  Previous patient of Dr. King.  She is doing well.  States compliance with tamoxifen.  Denies any hot flashes night sweats swings irritability or vaginal dryness.  She had left diagnostic mammogram and ultrasound in August 2024 for a palpable cystic mass in left breast.  It was benign.      Oncologic history:  patient is a 61-year-old female who is a  for [employer]. She recently back in September October 2021 noticed a mass in the left breast upper inner quadrant. She was playing with a cat and noticed some pain in the breast at which time she felt the breast and felt this mass. She has had history of nipple discharge on and off for years in the left breast but that has been very minimal. There is no bloody discharge. She has not had mammogram for years, at least 25 years according to her. So when she felt the mass she went to her primary care physician Dr. Zarina Tineo who ordered a diagnostic mammogram and found in abnormality in the left breast at 10 o'clock position which was 2.6 x 2.5 x 2.3 cm. Subsequently she underwent a biopsy which was consistent with invasive ductal carcinoma ER/NV positive HER-2/juan negative.    She does have a family history of malignancy with her grandmother having: And breast cancer on her maternal side. Her maternal great-grandmother had breast cancer. Her mother had thyroid cancer. Patient has had renal cell cancer at age 31 and had to undergo left nephrectomy. She also had a thyroid nodule for which she was seen at [place] by physician. He had placed her on Synthroid and she had a very severe allergic reaction to Synthroid with swelling and he discontinued that. Patient was to be followed in a years time and has not gone.. But she has not had any issues with that    More recently she lost her  3 weeks ago as he was dealing with colon cancer. He was 70 years of age. He did not  want any treatment. She had to take care of of him for the last 16 months and if no her symptoms. 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    Details are as follows    November 10, 2021: Bilateral diagnostic mammogram and ultrasound  FINDINGS: Bilateral digital CC and MLO mammographic and Tomosynthesis  images were obtained. No prior examination is available for comparison.  Scattered fibroglandular densities are seen throughout both breasts. In  the posterior one-third upper inner quadrant of the left breast at the  site of palpable concern corresponding to the overlying triangular skin  marker, there is an irregular mass that measures on the order of 2.6 x  2.5 x 2.3 cm. Internal coarse calcifications and areas of fat necrosis  are noted. I see no other dominant masses in either breast. No areas of  architectural distortion are appreciated. There is no evidence for skin  thickening, nipple retraction or axillary adenopathy.     ULTRASOUND: Targeted sonographic evaluation of the left breast was  performed through the area of palpable concern which corresponds to the  10-o'clock position on the order of 5 cm from the nipple. At this  location, there is an irregular hypoechoic mass with internal  calcifications. The mass measures 2.4 x 1.9 x 1.4 cm. No detectable  internal vascularity is appreciated. Posterior acoustic shadowing is  noted.     IMPRESSION:  1. There is an irregular mass in the left breast at the 10-o'clock  position corresponding to the site of palpable concern. Sonographically,  the mass measures on the order of 2.4 cm in greatest dimension.  Correlation with an ultrasound-guided left breast biopsy is recommended.  2. There are no findings suspicious for malignancy in the right breast.     November 9, 2021: Ultrasound-guided left breast biopsy showed    Final Diagnosis   1. Left Breast, 10 O'clock, 5 cm from Nipple,  Ultrasound-Guided Biopsies for a Mass: INVASIVE MAMMARY                CARCINOMA, NO SPECIAL TYPE (INVASIVE DUCTAL CARCINOMA).               A. Histologic grade: Eldon histologic score II (tubules = 3, nuclei = 2, mitosis = 1).               B. Largest contiguous invasive focus measures 10 mm in a core.               C. No definitive in situ component identified.                       D. No lymphovascular nor perineural invasion identified.     Estrogen receptor: %  Progesterone receptor: %  HER-2/juan: 1+, negative  Ki-67: 8%    December 28, 2021: MRI of the breast    FINDINGS:Scattered fibroglandular tissue is seen throughout both  breasts. Minimal background parenchymal enhancement of both breasts is  noted.     Within the posterior one-third upper inner quadrant of the left breast  centered at the 10 o'clock position on the order of 8 cm from nipple  there is an irregular enhancing mass that measures on the order of 2.5  cm in anterior to posterior dimension, 2.8 cm in the mediolateral  dimension and 2.1 cm in the superior-inferior dimension. A signal void  is seen within the mass that corresponds to a bowtie-shaped metallic  clip. This represents the biopsy-proven site of malignancy.     No other areas of abnormal enhancement or morphology are seen within the  left breast. I see no evidence for abnormal left breast skin, nipple or  chest wall enhancement.  There is no evidence for left axillary or  internal mammary chain adenopathy.     There are no areas of abnormal enhancement or morphology in the right  breast. I see no evidence for abnormal right breast skin, nipple or  chest wall enhancement and there is no evidence for right axillary or  internal mammary chain adenopathy.     IMPRESSION:  1. Biopsy-proven malignancy in the left breast at the 10 o'clock  position in the posterior one-third that measures on the order of 2.8 cm  in greatest dimension with a centrally located bowtie shaped  metallic  clip. No other suspicious findings are seen within the left breast and  there is no evidence for left axillary or internal mammary chain  adenopathy.  2. There are no findings suspicious for malignancy in the right breast.     BI-RADS category 6: Known malignancy.     This report was finalized on 12/28/2021 7:24 AM by Dr. Sixto Ramos M.D.     Patient has been scheduled for further recommendations. She has appointment with Dr. Beth Delong at 11 AM today.    January 17, 2022: Patient is s/p surgery left partial mastectomy with sentinel lymph node biopsy      Synoptic Checklist  INVASIVE CARCINOMA OF THE BREAST: Resection (INVASIVE CARCINOMA OF THE BREAST: COMPLETE EXCISION - All Specimens)  Specimen Laterality Left  .  TUMOR  Tumor Site Upper outer quadrant  Clock position  10 o'clock  Tumor Site Distance from nipple (Centimeters): 5 cm  Histologic Type Invasive carcinoma of no special type (ductal)  Histologic Grade ([place] Histologic Score)  Glandular (Acinar) / Tubular Differentiation Score 3  Nuclear Pleomorphism Score 2  Mitotic Rate Score 1  Overall Grade Grade 2 (scores of 6 or 7)  Tumor Size Greatest dimension of largest invasive focus (Millimeters): 30 mm  Tumor Focality Single focus of invasive carcinoma  Ductal Carcinoma In Situ (DCIS) Not identified  Lobular Carcinoma In Situ (LCIS) Not identified  Tumor Extent  Lymphovascular Invasion Not identified  Dermal Lymphovascular Invasion Not identified  Microcalcifications Present in invasive carcinoma  Present in non-neoplastic tissue  Treatment Effect in the Breast No known presurgical therapy  .  MARGINS  Margin Status for Invasive Carcinoma All margins negative for invasive carcinoma  Distance from Invasive Carcinoma to Closest Margin 12.8 mm  Closest Margin(s) to Invasive Carcinoma Inferior  Distance from Invasive Carcinoma to Posterior Margin 23 mm  Distance from Invasive Carcinoma to Superior Margin 13 mm  Distance from Invasive  Carcinoma to Medial Margin  Comment  The patient's corresponding left breast core biopsy material is pulled and reviewed for comparison ([id number]). The  tumors appear similar; no discordance is noted.  Selected slides from this case are shared in internal consultation with Bam Campbell and  who concur.  [facility]/kds  .  REGIONAL LYMPH NODES  Regional Lymph Node Status All regional lymph nodes negative for tumor  Total Number of Lymph Nodes Examined (sentinel and non-sentinel) 1  Number of [place] Nodes Examined 1  .  PATHOLOGIC STAGE CLASSIFICATION (pTNM, AJCC 8th Edition)    pT Category pT2  Regional Lymph Nodes Modifier (sn): [place] node(s) evaluated.  pN Category pN0    ER %  [place] %  HER-2/juan 1±  Ki-67 8%    Oncotype DX 14. No plans for chemo    We will start Femara         Current Outpatient Medications on File Prior to Visit   Medication Sig Dispense Refill    amLODIPine (NORVASC) 10 MG tablet Take 1 tablet by mouth Daily. 90 tablet 1    Blood Glucose Monitoring Suppl (OneTouch Verio Reflect) w/Device kit See Admin Instructions.      glucose blood test strip 1 each by Other route Daily. 25 each 2    Lancets (freestyle) lancets 1 each by Other route Daily. 100 each 4    metFORMIN ER (GLUCOPHAGE-XR) 500 MG 24 hr tablet Take 1 tablet by mouth 2 (Two) Times a Day With Meals. 360 tablet 1    rosuvastatin (Crestor) 5 MG tablet Take 1 tablet by mouth Daily. 90 tablet 1    Semaglutide, 2 MG/DOSE, (Ozempic, 2 MG/DOSE,) 8 MG/3ML solution pen-injector Inject 2 mg under the skin into the appropriate area as directed 1 (One) Time Per Week. 9 mL 3    tamoxifen (NOLVADEX) 20 MG chemo tablet Take 1 tablet by mouth Daily for 90 days. 90 tablet 2    valsartan (DIOVAN) 160 MG tablet Take 1 tablet by mouth Daily. 90 tablet 1     No current facility-administered medications on file prior to visit.        ALLERGIES:    Allergies   Allergen Reactions    Hydrocodone Anaphylaxis     PER PATIENT    Penicillins Itching         I have reviewed Past Medical, Surgical History, Social History, Meds and Allergies.     REVIEW OF SYSTEMS:  See interval History          Objective     There were no vitals filed for this visit.              7/26/2024    11:27 AM   Current Status   ECOG score 0       Physical Exam  Constitutional:       Appearance: Normal appearance.   HENT:      Head: Normocephalic and atraumatic.      Mouth/Throat:      Mouth: Mucous membranes are moist.      Pharynx: Oropharynx is clear.   Eyes:      Extraocular Movements: Extraocular movements intact.      Pupils: Pupils are equal, round, and reactive to light.   Cardiovascular:      Rate and Rhythm: Normal rate and regular rhythm.   Pulmonary:      Effort: Pulmonary effort is normal.      Breath sounds: Normal breath sounds.   Chest:       Abdominal:      General: Bowel sounds are normal.      Palpations: Abdomen is soft.   Musculoskeletal:      Cervical back: Normal range of motion and neck supple.   Neurological:      General: No focal deficit present.      Mental Status: She is alert and oriented to person, place, and time.   Psychiatric:         Mood and Affect: Mood normal.         Behavior: Behavior normal.         RECENT LABS:  Hematology WBC   Date Value Ref Range Status   06/13/2024 7.09 3.40 - 10.80 10*3/mm3 Final   01/30/2024 7.6 3.4 - 10.8 x10E3/uL Final     RBC   Date Value Ref Range Status   06/13/2024 4.72 3.77 - 5.28 10*6/mm3 Final   01/30/2024 4.95 3.77 - 5.28 x10E6/uL Final     Hemoglobin   Date Value Ref Range Status   06/13/2024 14.3 12.0 - 15.9 g/dL Final     Hematocrit   Date Value Ref Range Status   06/13/2024 43.5 34.0 - 46.6 % Final     Platelets   Date Value Ref Range Status   06/13/2024 243 140 - 450 10*3/mm3 Final          Assessment & Plan     *Stage IB (pT2 N0 M0) Invasive ductal carcinoma of the left breast , ER/FL positive  She felt a mass in September 2021 when she was holding the cat and noticed pain in the left breast upper inner quadrant  and then she felt the mass, she was seen by her primary care physician  November 10, 2021 Diagnostic mammogram : Bilateral showed a 2.5 x 2.6 x 2.3 cm mass in the posterior one third upper inner quadrant of the left breast.  Internal coarse calcification and areas of fat necrosis were noted.  No other dominant no masses were seen.  There was no skin thickening, nipple retraction or axillary adenopathy  Ultrasound showed a 2.4 x 1.9 x 1.4 cm mass in the 10 o'clock position 5 cm from the nipple  December 2, 2021 ultrasound-guided left breast biopsy showed invasive ductal carcinoma, grade 2 with a Eldon score of 6, %, %, HER-2/juan 1+, Ki-67 8%  December 23, 2021: Patient underwent MRI of the breast which showed a 2.5 x 2.8 x 2.1 cm irregular enhancing mass at 10 o'clock position, 8 cm from the nipple.  There was no evidence of any left axillary or internal mammary chain lymphadenopathy.  Right breast was negative  January 17, 2022 s/p  Left breast lumpectomy, grade 2 invasive ductal carcinoma measuring 30 mm with perineural invasion and microcalcifications identified adjacent to the clip and biopsy site change.  Focal minimal associated atypical ductal hyperplasia but no definite in situ carcinoma is identified all margins are free of tumor closest margin being 2.8 mm.  Incidental fibroadenoma 2.6 mm maximally with focal fat necrosis.  0 of 1 sentinel lymph node negative.  Right breast mammoplasty was benign  Oncotype DX testing shows score of 14, her benefit from chemo is less than 1%  February 25, 2022: Patient already seen by radiation oncology with plans to start radiation.  3/9/2022 - 4/5/2022: Status post adjuvant radiation  February 2022: Started endocrine treatment x 5 years (feb 2027).  Initially femara, switched to tamoxifen in 2024 March due to arthralgias  August 2024 left diagnostic mammo ultrasound-BI-RADS 2.  It was ordered for evaluation of left breast palpable cystic mass.  October  2024: Patient establish care with me.  States compliance with tamoxifen.  No issues.      *Family history of malignancy:  INVITAE genetic test negative for 47 genes    * History of renal cell cancer s/p left nephrectomy without evidence of recurrence and at age 31    *Bone health  5/5/2022 DEXA scan-within normal limits    Plan  Currently on tamoxifen and tolerating very well  Follow-up in 6 months with labs  Mammogram/imaging being ordered by Zane Kramer

## 2024-10-21 ENCOUNTER — LAB (OUTPATIENT)
Dept: LAB | Facility: HOSPITAL | Age: 64
End: 2024-10-21
Payer: COMMERCIAL

## 2024-10-21 ENCOUNTER — OFFICE VISIT (OUTPATIENT)
Dept: ONCOLOGY | Facility: CLINIC | Age: 64
End: 2024-10-21
Payer: COMMERCIAL

## 2024-10-21 VITALS
OXYGEN SATURATION: 96 % | SYSTOLIC BLOOD PRESSURE: 150 MMHG | DIASTOLIC BLOOD PRESSURE: 79 MMHG | RESPIRATION RATE: 16 BRPM | WEIGHT: 190.7 LBS | HEIGHT: 67 IN | BODY MASS INDEX: 29.93 KG/M2 | HEART RATE: 73 BPM | TEMPERATURE: 98.2 F

## 2024-10-21 DIAGNOSIS — C50.212 MALIGNANT NEOPLASM OF UPPER-INNER QUADRANT OF LEFT BREAST IN FEMALE, ESTROGEN RECEPTOR POSITIVE: Primary | ICD-10-CM

## 2024-10-21 DIAGNOSIS — C50.212 MALIGNANT NEOPLASM OF UPPER-INNER QUADRANT OF LEFT BREAST IN FEMALE, ESTROGEN RECEPTOR POSITIVE: ICD-10-CM

## 2024-10-21 DIAGNOSIS — Z17.0 MALIGNANT NEOPLASM OF UPPER-INNER QUADRANT OF LEFT BREAST IN FEMALE, ESTROGEN RECEPTOR POSITIVE: ICD-10-CM

## 2024-10-21 DIAGNOSIS — Z79.810 USE OF TAMOXIFEN (NOLVADEX): ICD-10-CM

## 2024-10-21 DIAGNOSIS — Z17.0 MALIGNANT NEOPLASM OF UPPER-INNER QUADRANT OF LEFT BREAST IN FEMALE, ESTROGEN RECEPTOR POSITIVE: Primary | ICD-10-CM

## 2024-10-21 LAB
ALBUMIN SERPL-MCNC: 4.3 G/DL (ref 3.5–5.2)
ALBUMIN/GLOB SERPL: 2 G/DL
ALP SERPL-CCNC: 94 U/L (ref 39–117)
ALT SERPL W P-5'-P-CCNC: 15 U/L (ref 1–33)
ANION GAP SERPL CALCULATED.3IONS-SCNC: 10.1 MMOL/L (ref 5–15)
AST SERPL-CCNC: 16 U/L (ref 1–32)
BASOPHILS # BLD AUTO: 0.01 10*3/MM3 (ref 0–0.2)
BASOPHILS NFR BLD AUTO: 0.2 % (ref 0–1.5)
BILIRUB SERPL-MCNC: 0.3 MG/DL (ref 0–1.2)
BUN SERPL-MCNC: 13 MG/DL (ref 8–23)
BUN/CREAT SERPL: 15.9 (ref 7–25)
CALCIUM SPEC-SCNC: 9.5 MG/DL (ref 8.6–10.5)
CHLORIDE SERPL-SCNC: 107 MMOL/L (ref 98–107)
CO2 SERPL-SCNC: 25.9 MMOL/L (ref 22–29)
CREAT SERPL-MCNC: 0.82 MG/DL (ref 0.57–1)
DEPRECATED RDW RBC AUTO: 42.8 FL (ref 37–54)
EGFRCR SERPLBLD CKD-EPI 2021: 81 ML/MIN/1.73
EOSINOPHIL # BLD AUTO: 0.28 10*3/MM3 (ref 0–0.4)
EOSINOPHIL NFR BLD AUTO: 4.4 % (ref 0.3–6.2)
ERYTHROCYTE [DISTWIDTH] IN BLOOD BY AUTOMATED COUNT: 12.8 % (ref 12.3–15.4)
GLOBULIN UR ELPH-MCNC: 2.2 GM/DL
GLUCOSE SERPL-MCNC: 121 MG/DL (ref 65–99)
HCT VFR BLD AUTO: 41.6 % (ref 34–46.6)
HGB BLD-MCNC: 13.7 G/DL (ref 12–15.9)
IMM GRANULOCYTES # BLD AUTO: 0.03 10*3/MM3 (ref 0–0.05)
IMM GRANULOCYTES NFR BLD AUTO: 0.5 % (ref 0–0.5)
LYMPHOCYTES # BLD AUTO: 1.68 10*3/MM3 (ref 0.7–3.1)
LYMPHOCYTES NFR BLD AUTO: 26.5 % (ref 19.6–45.3)
MCH RBC QN AUTO: 30.2 PG (ref 26.6–33)
MCHC RBC AUTO-ENTMCNC: 32.9 G/DL (ref 31.5–35.7)
MCV RBC AUTO: 91.8 FL (ref 79–97)
MONOCYTES # BLD AUTO: 0.34 10*3/MM3 (ref 0.1–0.9)
MONOCYTES NFR BLD AUTO: 5.4 % (ref 5–12)
NEUTROPHILS NFR BLD AUTO: 4.01 10*3/MM3 (ref 1.7–7)
NEUTROPHILS NFR BLD AUTO: 63 % (ref 42.7–76)
NRBC BLD AUTO-RTO: 0 /100 WBC (ref 0–0.2)
PLATELET # BLD AUTO: 221 10*3/MM3 (ref 140–450)
PMV BLD AUTO: 10.6 FL (ref 6–12)
POTASSIUM SERPL-SCNC: 4.6 MMOL/L (ref 3.5–5.2)
PROT SERPL-MCNC: 6.5 G/DL (ref 6–8.5)
RBC # BLD AUTO: 4.53 10*6/MM3 (ref 3.77–5.28)
SODIUM SERPL-SCNC: 143 MMOL/L (ref 136–145)
WBC NRBC COR # BLD AUTO: 6.35 10*3/MM3 (ref 3.4–10.8)

## 2024-10-21 PROCEDURE — 36415 COLL VENOUS BLD VENIPUNCTURE: CPT

## 2024-10-21 PROCEDURE — 85025 COMPLETE CBC W/AUTO DIFF WBC: CPT

## 2024-10-21 PROCEDURE — 80053 COMPREHEN METABOLIC PANEL: CPT

## 2024-10-29 ENCOUNTER — TELEPHONE (OUTPATIENT)
Dept: SURGERY | Facility: CLINIC | Age: 64
End: 2024-10-29
Payer: COMMERCIAL

## 2024-10-29 DIAGNOSIS — Z12.31 SCREENING MAMMOGRAM FOR BREAST CANCER: Primary | ICD-10-CM

## 2024-10-29 DIAGNOSIS — Z85.3 PERSONAL HISTORY OF BREAST CANCER: ICD-10-CM

## 2024-10-29 NOTE — TELEPHONE ENCOUNTER
Trina for pt to let her know I have cx her appt with us on 10/31 and rsch her scr mammo on 12/30 @ 2pm and then she will see mary kay guerin on 01/9     I told her to call me back with any questions

## 2024-12-24 DIAGNOSIS — I10 PRIMARY HYPERTENSION: ICD-10-CM

## 2024-12-24 DIAGNOSIS — R80.9 PROTEINURIA, UNSPECIFIED TYPE: ICD-10-CM

## 2024-12-26 RX ORDER — VALSARTAN 160 MG/1
160 TABLET ORAL DAILY
Qty: 90 TABLET | Refills: 0 | Status: SHIPPED | OUTPATIENT
Start: 2024-12-26

## 2024-12-30 ENCOUNTER — HOSPITAL ENCOUNTER (OUTPATIENT)
Dept: MAMMOGRAPHY | Facility: HOSPITAL | Age: 64
Discharge: HOME | End: 2024-12-30
Admitting: NURSE PRACTITIONER
Payer: COMMERCIAL

## 2024-12-30 DIAGNOSIS — Z12.31 SCREENING MAMMOGRAM FOR BREAST CANCER: ICD-10-CM

## 2024-12-30 DIAGNOSIS — Z85.3 PERSONAL HISTORY OF BREAST CANCER: ICD-10-CM

## 2024-12-30 PROCEDURE — 77063 BREAST TOMOSYNTHESIS BI: CPT

## 2024-12-30 PROCEDURE — 77067 SCR MAMMO BI INCL CAD: CPT

## 2025-01-06 ENCOUNTER — TELEPHONE (OUTPATIENT)
Dept: SURGERY | Facility: CLINIC | Age: 65
End: 2025-01-06

## 2025-01-06 NOTE — TELEPHONE ENCOUNTER
"  Hub staff attempted to follow warm transfer process and was unsuccessful     Caller: Daysi Lugo \"CD\"    Relationship to patient: Self    Best call back number: [phone]    Patient is needing: PATIENT WOULD LIKE TO RESCHEDULE APPT ON 1/9 WITH LONNIE      "

## 2025-01-07 DIAGNOSIS — E11.9 TYPE 2 DIABETES MELLITUS WITHOUT COMPLICATION, WITHOUT LONG-TERM CURRENT USE OF INSULIN: ICD-10-CM

## 2025-01-07 NOTE — TELEPHONE ENCOUNTER
Rx Refill Note  Requested Prescriptions     Pending Prescriptions Disp Refills    glucose blood test strip 25 each 2     Si each by Other route Daily.      Last office visit with prescribing clinician: 2024   Last telemedicine visit with prescribing clinician: Visit date not found   Next office visit with prescribing clinician: 2025                         Would you like a call back once the refill request has been completed: [] Yes [] No    If the office needs to give you a call back, can they leave a voicemail: [] Yes [] No    Smiley Mitchell MA  25, 14:37 EST

## 2025-02-21 ENCOUNTER — TELEPHONE (OUTPATIENT)
Dept: FAMILY MEDICINE CLINIC | Facility: CLINIC | Age: 65
End: 2025-02-21

## 2025-02-21 NOTE — TELEPHONE ENCOUNTER
HUB TO RELAY    LMVM - DR. ADAN IS NOT GOING TO BE IN THE OFFICE TODAY AND WE ARE NEEDING TO RESCHEDULE HER APPOINTMENT AND ITS TYPE NEEDS TO BE CHANGED TO A PHYSICAL APPOINTMENT ALSO.

## 2025-02-21 NOTE — TELEPHONE ENCOUNTER
"Name: Daysi Lugo \"CD\"      Relationship: Self      Best Callback Number: [phone]       HUB PROVIDED THE RELAY MESSAGE FROM THE OFFICE      PATIENT: SCHEDULED PER NOTE      "

## 2025-03-11 NOTE — PROGRESS NOTES
[facility]     Referring Provider: Zarina Tineo DO     Chief complaint: Routine follow up breast cancer     HPI:   12/29/21:  Seen by Dr Delong  Ms. Daysi Lugo is a 62 yo woman, seen at the request of Dr. Zarina Tineo, for a new diagnosis of left breast cancer. The patient first noticed a lump in her upper inner left breast in October. It has not changed in size since she first noticed it and she denies any associated pain. Her work-up is detailed in the oncologic history below. Prior to this year, it had been a least 25 years since she had a mammogram. She has a past history of a bilateral breast reduction in 2008.     She was recently diagnosed with diabetes and her PCP is trying to get her blood sugar under control. Her most recent hemoglobin A1c on 12/16/21 was 8.5. She also has a personal history of kidney cancer, sp right nephrectomy. Her maternal grandmother had breast cancer and colon cancer. She denies any family history of ovarian cancer. She saw Dr. King earlier today prior to this appointment and Dr. King sent genetic testing.      2/2/22:  Seen by Dr Delong  She underwent left partial mastectomy and SLNB with oncoplastic closure and right reduction for symmetry on 1/17/22. See surgery & pathology details below in oncologic history. She has been recovering well and has no complaints.     5/4/22:  seen by Dr Delong  She returns today for follow up with expected left breast discomfort s/p radiation treatments were completed in 4/2022.  She also right breast discomfort with central thickening, revision by Dr Castro planned in 10/22.  She will start femara today as instructed by Dr Ann.   Her  passed away in 2/22 from colon cancer, she continues to grieve.  He was sick with terminal cancer for 16 months and she was his caregiver.  She has cut back on her work demands, continues to work on her pHD.   On 4/25/22 she was seen by Dr Castro:  Center of the right breast has  a hard area. At time can be painful I explained to patient we will proceed with breast shape improvement with bilateral lateral breast skin tailoring, upper breast fat graft and possible breast induration resection   Her last clinic visit with Dr King was 2/25/22:  Oncotype DX testing shows score of 14, her benefit from chemo is less than 1%.  We will start aromatase enema to Femara. Explained in length side effects of Femara.    Seen in PT/LE clinic in 2/22 and again today:  Currently, negative s/s of lymphedema, infection, seroma, or axillary cording. We did complete a baseline post operative bioimpedance assessment, with current L-Dex score of -5.8 , which is WNL    10/20/22:  Seen by Dr Delong  She returns today for scheduled follow-up. She underwent mammogram prior to her appointment which was benign (see report details below). She remains on Femara and is still tolerating this well. She is scheduled to undergo revision with Dr. Castro next week. She has lumpy areas on both sides that she wants removed. She also complains of occasional pain on both sides.    1/23/23:  She returns today for follow up with no breast complaints, she is happy with her revision done in 11/22.  Occasional sharp random pains in bilateral breasts c/w surgery.  Bilateral breast revision was completed 11/22 by Dr Castro    Her last clinic visit with Dr King was in 9/22:  She started Femara February 2022 and is currently tolerating without any hot flashes or joint pains.  Patient has not not lost weight and she is tolerating Femara well without arthralgias    10/24/23  She returns today for follow up with a left breast lump she has noticed for about 3-4- weeks.    Her last clinic visit with Dr King was in 9/23:  Patient is tolerating Femara well.  I reviewed the CT scan from 2022 which shows hepatic steatosis.  Encouraged her on proper nutrition and decrease fried foods and fatty foods.  She does not have much  arthralgias.  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  Encouraged her to focus on exercise.     Screening mammogram with tomosynthesis 10/16/23 was stable, BiRads 2.  (see full report below)    3/13/2025 interval history  Presents today for routine follow-up and exam.  Left limited ultrasound 12/11/2023 resulted in BI-RADS 2 for a fat necrosis.  Left diagnostic mammogram and left limited ultrasound on 8/21/2024 resulted in BI-RADS 2 and fat necrosis.  Completed bilateral screening mammogram on 12/30/2024 resulting in BI-RADS 2.  Last appointment with Dr. Henry on 10/21/2024, she will continue with tamoxifen.  Complaining of right breast pain for the past 2 months off and on.  Wears a bra most of the time. Also having left clear discharge from the nipple for the past 2 months coming out on its own. Not expressing.         Oncology/Hematology History   Malignant neoplasm of upper-inner quadrant of left breast in female, estrogen receptor positive   11/8/2021 Initial Diagnosis    Malignant neoplasm of upper-inner quadrant of left breast in female, estrogen receptor positive (HCC)     11/9/2021 Imaging    Bilateral Diagnostic MMG with Adolph & Left Breast US ([facility]):  MMG:  Scattered fibroglandular densities. In the posterior one-third upper inner quadrant of the left breast at the site of palpable concern corresponding to the overlying triangular skin marker, there is an irregular mass that measures on the order of 2.6 x 2.5 x 2.3 cm. Internal coarse calcifications and area of fat necrosis are noted. I see no other dominant masses in either breast. No areas of architectural distortion are appreciated. There is no evidence for skin thickening, nipple retraction or axillary adenopathy.  US:  Targeted sonographic evaluation of the left breast was performed through the area of palpable concern which corresponds to the 10 o'clock position on the order of 5 cm from the nipple. At this  location, there is an irregular hypoechoic mass with internal calcifications. The mass measures 2.4 x 1.9 x 1.4 cm. No detectable internal vascularity is appreciated. Posterior acoustic shadowing is noted.  BI-RADS 4: Suspicious.     12/2/2021 Biopsy    Left Breast, US-Guided Biopsy ( [place]):    1. Left Breast, 10 O'clock, 5 cm from Nipple, Ultrasound-Guided Biopsies for a Mass: INVASIVE MAMMARY                CARCINOMA, NO SPECIAL TYPE (INVASIVE DUCTAL CARCINOMA).               A. Histologic grade: [place] histologic score II (tubules = 3, nuclei = 2, mitosis = 1).               B. Largest contiguous invasive focus measures 10 mm in a core.               C. No definitive in situ component identified.                       D. No lymphovascular nor perineural invasion identified.    ER+ (%, strong)  OR+ (%, strong)  HER2 negative (IHC 1+)  Ki-67 8%     12/23/2021 Imaging    Bilateral Breast MRI ( [place]):  Within the posterior one-third upper inner quadrant of the left breast centered at the 10 o'clock position on the order of 8 cm from nipple there is an irregular enhancing mass that measures on the order of 2.5 cm in anterior to posterior dimension, 2.8 cm in the mediolateral dimension and 2.1 cm in the superior-inferior dimension. A signal void is seen within the mass that corresponds to a bowtie-shaped metallic clip. This represents the biopsy-proven site of malignancy.   No other areas of abnormal enhancement or morphology are seen within the left breast. I see no evidence for abnormal left breast skin, nipple or chest wall enhancement.  There is no evidence for left axillary or internal mammary chain adenopathy.    There are no areas of abnormal enhancement or morphology in the right breast. I see no evidence for abnormal right breast skin, nipple or chest wall enhancement and there is no evidence for right axillary or internal mammary chain adenopathy.  BI-RADS 6: Known malignancy.     12/29/2021  Genetic Testing    [employer] Common Hereditary Cancers Panel (47 genes):    Negative     1/17/2022 Surgery    Left ultrasound-guided partial mastectomy and sentinel lymph node biopsy with oncoplastic closure and right reduction for symmetry    1.  Breast, Left, Lumpectomy (88 grams):                A.  Invasive mammary carcinoma, no special type (ductal), [place] histologic grade II        (tubules = 3, nuclei = 2, mitoses = 1), measuring 30 mm maximally with associated         perineural invasion and microcalcifications identified adjacent to bow tie clip and biopsy site change.   B.  Focal  minimal associated atypical ductal hyperplasia; no definitive in situ carcinoma is identified.   C.  All margins are free of tumor (closest margin inferior at 2.8 mm).  D.  Incidental fibroadenoma 2.6 mm maximally.    E.  Focal fat necrosis.  F.  See synoptic template for complete details and final margin measurements.      2.  Breast, Left, Additional, Medial, Inferior and Lateral Margins, Inked and Oriented Excision:   A.  Benign breast parenchyma with focal organizing fat necrosis; the final medial inferior and lateral margins are free of tumor by an additional 10 mm.      3.  Breast, Left Additional Posterior Margin, Inked and Oriented Excision:                A.  Benign fibroadipose tissue and skeletal muscle; the final posterior margin is free of tumor and         by an additional 3 mm.      4.  Lymph Node, Left Axillary, Anton #1, Excision:                 A.  Benign lymph node (0/1).     5.  Breast, Right, Reduction Mammoplasty (369 grams):                 A.  Benign skin, subcutaneous tissue and breast parenchyma.      6.  Breast, Left, Additional Anterior Margin, Excision:                A.  Benign skin and subcutaneous tissue (tumor measures an estimated 20 mm from the overlying skin surface).     7.  Breast, Left, Reduction Mammoplasty (225 grams):                A.  Benign skin, subcutaneous and breast  parenchyma.       Oncotype DX: 14     3/9/2022 - 4/5/2022 Radiation    Radiation OncologyTreatment Course:  Daysi Lugo received 4990 cGy in 20 fractions to LEFT breast.     4/6/2022 -  Hormonal Therapy    Femara     10/14/2022 Imaging    Screening MMG with Adolph ([facility]):  The breasts are heterogeneously dense, which may obscure small masses.   New benign-appearing postsurgical changes are identified in both breasts, including areas of benign-appearing fat necrosis. There are benign-appearing calcifications.  There are no suspicious masses, calcifications, or areas of architectural distortion.  BI-RADS 2: Benign.     10/16/2023 Imaging    Bilateral screening mammogram with tomosynthesis at [facility]  FINDINGS: Bilateral digital CC and MLO mammographic and digital Tomosynthesis images were obtained. Comparison is made to prior studies dated 10/14/2022 and 12/2/2021. Scattered fibroglandular densities are seen throughout both breasts in a pattern which is unchanged. I see no new or dominant masses, areas of architectural distortion or skin  thickening. Areas of fat necrosis and benign calcifications are noted in both breasts. There is no evidence for axillary lymphadenopathy or nipple retraction.   IMPRESSION:  1. There is no evidence for malignancy or significant change in either breast. Routine followup mammography is recommended.   BI-RADS category 2: Benign.       12/11/2023 Imaging    Left limited breast ultrasound at [facility]  Targeted sonographic evaluation of the left breast was performed in the  area of concern indicated by the patient. At 11:00, 3 cm from the  nipple, deep to surgical scarring, there is a 1.9 x 1.3 x 1.8 cm  predominantly cystic mass, which is in the region of an area of  benign-appearing fat necrosis on recent mammogram 10/16/2023. The  mammographic appearance of the fat necrosis has evolved from prior  mammogram from 2022, consistent with expected benign evolution. This is  overall benign in  appearance.   IMPRESSION:  Palpable benign-appearing fat necrosis at 11:00 in the left breast.  Further management should be based on clinical assessment. Recommend  annual screening mammogram in October 2024.  BI-RADS Category 2     8/21/2024 Imaging    Left diagnostic mammogram and left limited breast ultrasound at [facility]  Standard images and R2 computerized assisted detection were obtained  followed by digital tomosynthesis. The left breast demonstrates  scattered fibroglandular density and there is moderate postsurgical  scarring associated with fat necrosis in the posterior third of the  upper left breast near 12 o'clock. The skin marker is placed in the same  region where there is a large focus of fat necrosis and oil cyst  formation measuring 2.3 cm on the mammogram. This appearance is  unchanged from 10/16/2023 and the correlating ultrasound exam confirms  the presence of a large complex lesion consistent with fat necrosis.  This is located at 11 o'clock 5 cm from the nipple and measures 1.9 x  1.2 x 2.3 cm. No other abnormality is seen.     CONCLUSION: Benign left-sided mammogram and directed left breast  ultrasound showing findings consistent with fat necrosis. The mammogram  shows no change from 10/16/2023. BI-RADS 2. Benign.     8/21/2024 Imaging    Left diagnostic mammogram and left limited breast ultrasound at [facility]  Standard images and R2 computerized assisted detection were obtained  followed by digital tomosynthesis. The left breast demonstrates  scattered fibroglandular density and there is moderate postsurgical  scarring associated with fat necrosis in the posterior third of the  upper left breast near 12 o'clock. The skin marker is placed in the same  region where there is a large focus of fat necrosis and oil cyst  formation measuring 2.3 cm on the mammogram. This appearance is  unchanged from 10/16/2023 and the correlating ultrasound exam confirms  the presence of a large complex lesion consistent  with fat necrosis.  This is located at 11 o'clock 5 cm from the nipple and measures 1.9 x  1.2 x 2.3 cm. No other abnormality is seen.  CONCLUSION: Benign left-sided mammogram and directed left breast  ultrasound showing findings consistent with fat necrosis. The mammogram  shows no change from 10/16/2023. BI-RADS 2     12/30/2024 Imaging    Bilateral screening mammogram at [facility]  There are scattered areas of fibroglandular density.  Benign appearing reduction mammaplasty changes are identified. There are  benign-appearing calcifications. There are no suspicious masses,  calcifications, or areas of architectural distortion.  IMPRESSION/RECOMMENDATION(S):  No mammographic evidence of malignancy. Recommend annual screening  mammogram in one year.  Note: In patients with heterogeneously dense or extremely dense tissue,  supplemental screening breast MRI or whole breast ultrasound should be  considered. Please see the findings section above for this patient's  personalized breast density category.  BI-RADS Category 2         Review of Systems:  See interval history.       Medications:    Current Outpatient Medications:     amLODIPine (NORVASC) 10 MG tablet, Take 1 tablet by mouth Daily., Disp: 90 tablet, Rfl: 1    Blood Glucose Monitoring Suppl (OneTouch Verio Reflect) w/Device kit, See Admin Instructions., Disp: , Rfl:     glucose blood test strip, 1 each by Other route Daily., Disp: 25 each, Rfl: 2    Lancets (freestyle) lancets, 1 each by Other route Daily., Disp: 100 each, Rfl: 4    metFORMIN ER (GLUCOPHAGE-XR) 500 MG 24 hr tablet, Take 1 tablet by mouth 2 (Two) Times a Day With Meals., Disp: 360 tablet, Rfl: 1    rosuvastatin (Crestor) 5 MG tablet, Take 1 tablet by mouth Daily., Disp: 90 tablet, Rfl: 1    Semaglutide, 2 MG/DOSE, (Ozempic, 2 MG/DOSE,) 8 MG/3ML solution pen-injector, Inject 2 mg under the skin into the appropriate area as directed 1 (One) Time Per Week., Disp: 9 mL, Rfl: 3    valsartan (DIOVAN) 160 MG  tablet, Take 1 tablet by mouth Daily., Disp: 90 tablet, Rfl: 0      Allergies   Allergen Reactions    Hydrocodone Anaphylaxis     PER PATIENT    Penicillins Itching       Family History   Problem Relation Age of Onset    Diabetes type II Mother     Hypertension Mother     Thyroid cancer Mother     Breast cancer Maternal Grandmother     Colon cancer Maternal Grandmother     Diabetes Maternal Grandmother     Stroke Maternal Grandmother     Alzheimer's disease Maternal Grandfather     Malig Hyperthermia Neg Hx        PHYSICAL EXAMINATION:   There were no vitals filed for this visit.       LMP  (LMP Unknown)     I reviewed physical exam, no changes noted    ECOG 0 - Asymptomatic  General: NAD, well appearing  Psych: a&o x3, normal mood and affect  Eyes: EOMI, no scleral icterus  ENMT: neck supple without masses or thyromegaly, mucous membranes moist  MSK: normal gait, normal ROM in bilateral shoulders  Lymph nodes: Left axillary scar; no cervical, supraclavicular or axillary lymphadenopathy  Breast: Good symmetry  Right: On inspection there are well-healed mastopexy scars. There is a firm area which surrounds the NAC medially and feels like fat necrosis.  No nipple abnormalities.  Left: On inspection there are well-healed mastopexy scars.  No nipple abnormalities. At the area of concern, 1200, 2 CFN a mass, 2 cm is noted. Clear discharge from one duct      Assessment:   1)   65 y.o. F with a diagnosis of left breast cancer 12/2021: Intermediate grade, invasive ductal carcinoma, ER/GA+, Her2 negative. She underwent left partial mastectomy and SLNB with oncoplastic closure and right reduction for symmetry on 1/17/22, pT2N0, anatomic stage IIA, prognostic stage IA. She completed radiation on 4/5/22. She is currently on Femara.    2)  left breast mass  3) right breast pain  4) left nipple discharge        Plan:  -Continue follow-up with Dr. King.  -Continue follow-up with Dr. Castro.  -Left diagnostic mammogram and  left limited ultrasound in near future we will call with results if this is normal we will proceed with an MRI because of clear nipple discharge    LYNNE Cook      CC:  Zarina Tineo DO

## 2025-03-13 ENCOUNTER — OFFICE VISIT (OUTPATIENT)
Dept: SURGERY | Facility: CLINIC | Age: 65
End: 2025-03-13
Payer: COMMERCIAL

## 2025-03-13 VITALS
DIASTOLIC BLOOD PRESSURE: 82 MMHG | BODY MASS INDEX: 28.56 KG/M2 | WEIGHT: 182 LBS | SYSTOLIC BLOOD PRESSURE: 132 MMHG | HEIGHT: 67 IN | OXYGEN SATURATION: 96 % | HEART RATE: 88 BPM

## 2025-03-13 DIAGNOSIS — C50.212 MALIGNANT NEOPLASM OF UPPER-INNER QUADRANT OF LEFT BREAST IN FEMALE, ESTROGEN RECEPTOR POSITIVE: Primary | ICD-10-CM

## 2025-03-13 DIAGNOSIS — Z17.0 MALIGNANT NEOPLASM OF UPPER-INNER QUADRANT OF LEFT BREAST IN FEMALE, ESTROGEN RECEPTOR POSITIVE: Primary | ICD-10-CM

## 2025-03-13 DIAGNOSIS — N64.52 NIPPLE DISCHARGE: ICD-10-CM

## 2025-03-13 DIAGNOSIS — Z12.31 ENCOUNTER FOR SCREENING MAMMOGRAM FOR MALIGNANT NEOPLASM OF BREAST: ICD-10-CM

## 2025-03-13 RX ORDER — TAMOXIFEN CITRATE 20 MG/1
TABLET ORAL
COMMUNITY
Start: 2024-12-30

## 2025-03-20 DIAGNOSIS — R80.9 PROTEINURIA, UNSPECIFIED TYPE: ICD-10-CM

## 2025-03-20 DIAGNOSIS — I10 PRIMARY HYPERTENSION: ICD-10-CM

## 2025-03-20 RX ORDER — AMLODIPINE BESYLATE 10 MG/1
10 TABLET ORAL DAILY
Qty: 90 TABLET | Refills: 1 | Status: SHIPPED | OUTPATIENT
Start: 2025-03-20

## 2025-03-20 RX ORDER — VALSARTAN 160 MG/1
160 TABLET ORAL DAILY
Qty: 90 TABLET | Refills: 1 | Status: SHIPPED | OUTPATIENT
Start: 2025-03-20

## 2025-03-20 NOTE — TELEPHONE ENCOUNTER
Rx Refill Note  Requested Prescriptions     Pending Prescriptions Disp Refills    amLODIPine (NORVASC) 10 MG tablet 90 tablet 1     Sig: Take 1 tablet by mouth Daily.    valsartan (DIOVAN) 160 MG tablet 90 tablet 1     Sig: Take 1 tablet by mouth Daily.      Last office visit with prescribing clinician: 8/1/2024   Last telemedicine visit with prescribing clinician: Visit date not found   Next office visit with prescribing clinician: 5/28/2025                         Would you like a call back once the refill request has been completed: [] Yes [] No    If the office needs to give you a call back, can they leave a voicemail: [] Yes [] No    Smiley Chandler MA  03/20/25, 08:18 EDT

## 2025-03-28 ENCOUNTER — RESULTS FOLLOW-UP (OUTPATIENT)
Dept: SURGERY | Facility: CLINIC | Age: 65
End: 2025-03-28
Payer: COMMERCIAL

## 2025-03-28 ENCOUNTER — HOSPITAL ENCOUNTER (OUTPATIENT)
Dept: ULTRASOUND IMAGING | Facility: HOSPITAL | Age: 65
Discharge: HOME | End: 2025-03-28
Payer: COMMERCIAL

## 2025-03-28 ENCOUNTER — HOSPITAL ENCOUNTER (OUTPATIENT)
Dept: MAMMOGRAPHY | Facility: HOSPITAL | Age: 65
Discharge: HOME | End: 2025-03-28
Payer: COMMERCIAL

## 2025-03-28 DIAGNOSIS — N64.52 NIPPLE DISCHARGE: Primary | ICD-10-CM

## 2025-03-28 PROCEDURE — G0279 TOMOSYNTHESIS, MAMMO: HCPCS

## 2025-03-28 PROCEDURE — 77065 DX MAMMO INCL CAD UNI: CPT

## 2025-03-28 PROCEDURE — 76642 ULTRASOUND BREAST LIMITED: CPT

## 2025-03-28 NOTE — TELEPHONE ENCOUNTER
Trina for pt and let her know the following  Please let her know that her diagnostic imaging came back normal but we now need to do an MRI. Please set her up for an MRI and I will call her with results   I isiah her a MRI here at the hospital on 05/1 @ 099   I told her to call back w any questions or concerns

## 2025-03-28 NOTE — TELEPHONE ENCOUNTER
----- Message from Zane Kramer sent at 3/28/2025 11:21 AM EDT -----  Please let her know that her diagnostic imaging came back normal but we now need to do an MRI.  Please set her up for an MRI and I will call her with results  ----- Message -----  From: Interface, Rad Results [place] In  Sent: 3/28/2025  11:11 AM EDT  To: LYNNE Cook

## 2025-03-31 ENCOUNTER — TELEPHONE (OUTPATIENT)
Dept: ONCOLOGY | Facility: CLINIC | Age: 65
End: 2025-03-31

## 2025-03-31 NOTE — TELEPHONE ENCOUNTER
"  Caller: Daysi Lugo \"CD\"    Relationship to patient: Self    Best call back number: [phone]    Type of visit: LAB, FU1    Requested date: PT REQUESTING A CALLBACK TO R/S    If rescheduling, when is the original appointment: 4/10               "

## 2025-04-14 NOTE — PROGRESS NOTES
Chief Complaint   Patient presents with    Follow-up       4/15/2025, interval history:  Patient is here for follow-up.  Tolerating tamoxifen quite well.  Occasional hot flashes.  Do not interfere with life.      Oncologic history:  patient is a 61-year-old female who is a  for [employer]. She recently back in September October 2021 noticed a mass in the left breast upper inner quadrant. She was playing with a cat and noticed some pain in the breast at which time she felt the breast and felt this mass. She has had history of nipple discharge on and off for years in the left breast but that has been very minimal. There is no bloody discharge. She has not had mammogram for years, at least 25 years according to her. So when she felt the mass she went to her primary care physician Dr. Zarina Tineo who ordered a diagnostic mammogram and found in abnormality in the left breast at 10 o'clock position which was 2.6 x 2.5 x 2.3 cm. Subsequently she underwent a biopsy which was consistent with invasive ductal carcinoma ER/RI positive HER-2/juan negative.    She does have a family history of malignancy with her grandmother having: And breast cancer on her maternal side. Her maternal great-grandmother had breast cancer. Her mother had thyroid cancer. Patient has had renal cell cancer at age 31 and had to undergo left nephrectomy. She also had a thyroid nodule for which she was seen at [facility] by physician. He had placed her on Synthroid and she had a very severe allergic reaction to Synthroid with swelling and he discontinued that. Patient was to be followed in a years time and has not gone.. But she has not had any issues with that    More recently she lost her  3 weeks ago as he was dealing with colon cancer. He was 70 years of age. He did not want any treatment. She had to take care of of him for the last 16 months and if no her symptoms. 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    Details are as follows    November 10, 2021: Bilateral diagnostic mammogram and ultrasound  FINDINGS: Bilateral digital CC and MLO mammographic and Tomosynthesis  images were obtained. No prior examination is available for comparison.  Scattered fibroglandular densities are seen throughout both breasts. In  the posterior one-third upper inner quadrant of the left breast at the  site of palpable concern corresponding to the overlying triangular skin  marker, there is an irregular mass that measures on the order of 2.6 x  2.5 x 2.3 cm. Internal coarse calcifications and areas of fat necrosis  are noted. I see no other dominant masses in either breast. No areas of  architectural distortion are appreciated. There is no evidence for skin  thickening, nipple retraction or axillary adenopathy.     ULTRASOUND: Targeted sonographic evaluation of the left breast was  performed through the area of palpable concern which corresponds to the  10-o'clock position on the order of 5 cm from the nipple. At this  location, there is an irregular hypoechoic mass with internal  calcifications. The mass measures 2.4 x 1.9 x 1.4 cm. No detectable  internal vascularity is appreciated. Posterior acoustic shadowing is  noted.     IMPRESSION:  1. There is an irregular mass in the left breast at the 10-o'clock  position corresponding to the site of palpable concern. Sonographically,  the mass measures on the order of 2.4 cm in greatest dimension.  Correlation with an ultrasound-guided left breast biopsy is recommended.  2. There are no findings suspicious for malignancy in the right breast.     November 9, 2021: Ultrasound-guided left breast biopsy showed    Final Diagnosis   1. Left Breast, 10 O'clock, 5 cm from Nipple, Ultrasound-Guided Biopsies for a Mass: INVASIVE MAMMARY                CARCINOMA, NO SPECIAL TYPE (INVASIVE DUCTAL CARCINOMA).               A. Histologic  grade: Eldon histologic score II (tubules = 3, nuclei = 2, mitosis = 1).               B. Largest contiguous invasive focus measures 10 mm in a core.               C. No definitive in situ component identified.                       D. No lymphovascular nor perineural invasion identified.     Estrogen receptor: %  Progesterone receptor: %  HER-2/juan: 1+, negative  Ki-67: 8%    December 28, 2021: MRI of the breast    FINDINGS:Scattered fibroglandular tissue is seen throughout both  breasts. Minimal background parenchymal enhancement of both breasts is  noted.     Within the posterior one-third upper inner quadrant of the left breast  centered at the 10 o'clock position on the order of 8 cm from nipple  there is an irregular enhancing mass that measures on the order of 2.5  cm in anterior to posterior dimension, 2.8 cm in the mediolateral  dimension and 2.1 cm in the superior-inferior dimension. A signal void  is seen within the mass that corresponds to a bowtie-shaped metallic  clip. This represents the biopsy-proven site of malignancy.     No other areas of abnormal enhancement or morphology are seen within the  left breast. I see no evidence for abnormal left breast skin, nipple or  chest wall enhancement.  There is no evidence for left axillary or  internal mammary chain adenopathy.     There are no areas of abnormal enhancement or morphology in the right  breast. I see no evidence for abnormal right breast skin, nipple or  chest wall enhancement and there is no evidence for right axillary or  internal mammary chain adenopathy.     IMPRESSION:  1. Biopsy-proven malignancy in the left breast at the 10 o'clock  position in the posterior one-third that measures on the order of 2.8 cm  in greatest dimension with a centrally located bowtie shaped metallic  clip. No other suspicious findings are seen within the left breast and  there is no evidence for left axillary or internal mammary  chain  adenopathy.  2. There are no findings suspicious for malignancy in the right breast.     BI-RADS category 6: Known malignancy.     This report was finalized on 12/28/2021 7:24 AM by Dr. Sixto Ramos M.D.     Patient has been scheduled for further recommendations. She has appointment with Dr. Beth Delong at 11 AM today.    January 17, 2022: Patient is s/p surgery left partial mastectomy with sentinel lymph node biopsy      Synoptic Checklist  INVASIVE CARCINOMA OF THE BREAST: Resection (INVASIVE CARCINOMA OF THE BREAST: COMPLETE EXCISION - All Specimens)  Specimen Laterality Left  .  TUMOR  Tumor Site Upper outer quadrant  Clock position  10 o'clock  Tumor Site Distance from nipple (Centimeters): 5 cm  Histologic Type Invasive carcinoma of no special type (ductal)  Histologic Grade (Eldon Histologic Score)  Glandular (Acinar) / Tubular Differentiation Score 3  Nuclear Pleomorphism Score 2  Mitotic Rate Score 1  Overall Grade Grade 2 (scores of 6 or 7)  Tumor Size Greatest dimension of largest invasive focus (Millimeters): 30 mm  Tumor Focality Single focus of invasive carcinoma  Ductal Carcinoma In Situ (DCIS) Not identified  Lobular Carcinoma In Situ (LCIS) Not identified  Tumor Extent  Lymphovascular Invasion Not identified  Dermal Lymphovascular Invasion Not identified  Microcalcifications Present in invasive carcinoma  Present in non-neoplastic tissue  Treatment Effect in the Breast No known presurgical therapy  .  MARGINS  Margin Status for Invasive Carcinoma All margins negative for invasive carcinoma  Distance from Invasive Carcinoma to Closest Margin 12.8 mm  Closest Margin(s) to Invasive Carcinoma Inferior  Distance from Invasive Carcinoma to Posterior Margin 23 mm  Distance from Invasive Carcinoma to Superior Margin 13 mm  Distance from Invasive Carcinoma to Medial Margin  Comment  The patient's corresponding left breast core biopsy material is pulled and reviewed for comparison  ([id number]). The  tumors appear similar; no discordance is noted.  Selected slides from this case are shared in internal consultation with Bam Campbell and  who concur.  [facility]/kds  .  REGIONAL LYMPH NODES  Regional Lymph Node Status All regional lymph nodes negative for tumor  Total Number of Lymph Nodes Examined (sentinel and non-sentinel) 1  Number of [place] Nodes Examined 1  .  PATHOLOGIC STAGE CLASSIFICATION (pTNM, AJCC 8th Edition)    pT Category pT2  Regional Lymph Nodes Modifier (sn): [place] node(s) evaluated.  pN Category pN0    ER %  NV %  HER-2/juan 1±  Ki-67 8%    Oncotype DX 14. No plans for chemo    We will start Femara         Current Outpatient Medications on File Prior to Visit   Medication Sig Dispense Refill    amLODIPine (NORVASC) 10 MG tablet Take 1 tablet by mouth Daily. 90 tablet 1    Blood Glucose Monitoring Suppl (OneTouch Verio Reflect) w/Device kit See Admin Instructions.      glucose blood test strip 1 each by Other route Daily. 25 each 2    Lancets (freestyle) lancets 1 each by Other route Daily. 100 each 4    metFORMIN ER (GLUCOPHAGE-XR) 500 MG 24 hr tablet Take 1 tablet by mouth 2 (Two) Times a Day With Meals. 360 tablet 1    rosuvastatin (Crestor) 5 MG tablet Take 1 tablet by mouth Daily. 90 tablet 1    Semaglutide, 2 MG/DOSE, (Ozempic, 2 MG/DOSE,) 8 MG/3ML solution pen-injector Inject 2 mg under the skin into the appropriate area as directed 1 (One) Time Per Week. 9 mL 3    valsartan (DIOVAN) 160 MG tablet Take 1 tablet by mouth Daily. 90 tablet 1    [DISCONTINUED] tamoxifen (NOLVADEX) 20 MG chemo tablet        No current facility-administered medications on file prior to visit.        ALLERGIES:    Allergies   Allergen Reactions    Hydrocodone Anaphylaxis     PER PATIENT    Penicillins Itching        I have reviewed Past Medical, Surgical History, Social History, Meds and Allergies.     REVIEW OF SYSTEMS:  See interval History          Objective     Vitals:     "04/15/25 1051   BP: 151/95   Pulse: 72   Temp: 97.9 °F (36.6 °C)   TempSrc: Oral   SpO2: 98%   Weight: 86.5 kg (190 lb 9.6 oz)   Height: 170.2 cm (67.01\")   PainSc: 8    PainLoc: Back                 4/15/2025    10:53 AM   Current Status   ECOG score 0       Physical Exam  Constitutional:       Appearance: Normal appearance.   HENT:      Head: Normocephalic and atraumatic.      Mouth/Throat:      Mouth: Mucous membranes are moist.      Pharynx: Oropharynx is clear.   Eyes:      Extraocular Movements: Extraocular movements intact.      Pupils: Pupils are equal, round, and reactive to light.   Cardiovascular:      Rate and Rhythm: Normal rate and regular rhythm.   Pulmonary:      Effort: Pulmonary effort is normal.      Breath sounds: Normal breath sounds.   Chest:   Breasts:     Left: Nipple discharge (clear discharge can be expressed) present.       Abdominal:      General: Bowel sounds are normal.      Palpations: Abdomen is soft.   Musculoskeletal:      Cervical back: Normal range of motion and neck supple.   Neurological:      General: No focal deficit present.      Mental Status: She is alert and oriented to person, place, and time.   Psychiatric:         Mood and Affect: Mood normal.         Behavior: Behavior normal.     I have reexamined the patient and the results are consistent with the previously documented exam. Hilary Henry MD       RECENT LABS:  Hematology WBC   Date Value Ref Range Status   04/15/2025 5.92 3.40 - 10.80 10*3/mm3 Final   01/30/2024 7.6 3.4 - 10.8 x10E3/uL Final     RBC   Date Value Ref Range Status   04/15/2025 4.77 3.77 - 5.28 10*6/mm3 Final   01/30/2024 4.95 3.77 - 5.28 x10E6/uL Final     Hemoglobin   Date Value Ref Range Status   04/15/2025 14.5 12.0 - 15.9 g/dL Final     Hematocrit   Date Value Ref Range Status   04/15/2025 44.4 34.0 - 46.6 % Final     Platelets   Date Value Ref Range Status   04/15/2025 228 140 - 450 10*3/mm3 Final          Assessment & Plan     *Stage IB (pT2 " N0 M0) Invasive ductal carcinoma of the left breast , ER/CO positive  *Left nipple discharge, ongoing since diagnosis  She felt a mass in September 2021 when she was holding the cat and noticed pain in the left breast upper inner quadrant and then she felt the mass, she was seen by her primary care physician  November 10, 2021 Diagnostic mammogram : Bilateral showed a 2.5 x 2.6 x 2.3 cm mass in the posterior one third upper inner quadrant of the left breast.  Internal coarse calcification and areas of fat necrosis were noted.  No other dominant no masses were seen.  There was no skin thickening, nipple retraction or axillary adenopathy  Ultrasound showed a 2.4 x 1.9 x 1.4 cm mass in the 10 o'clock position 5 cm from the nipple  December 2, 2021 ultrasound-guided left breast biopsy showed invasive ductal carcinoma, grade 2 with a Eldon score of 6, %, %, HER-2/juan 1+, Ki-67 8%  December 23, 2021: Patient underwent MRI of the breast which showed a 2.5 x 2.8 x 2.1 cm irregular enhancing mass at 10 o'clock position, 8 cm from the nipple.  There was no evidence of any left axillary or internal mammary chain lymphadenopathy.  Right breast was negative  January 17, 2022 s/p  Left breast lumpectomy, grade 2 invasive ductal carcinoma measuring 30 mm with perineural invasion and microcalcifications identified adjacent to the clip and biopsy site change.  Focal minimal associated atypical ductal hyperplasia but no definite in situ carcinoma is identified all margins are free of tumor closest margin being 2.8 mm.  Incidental fibroadenoma 2.6 mm maximally with focal fat necrosis.  0 of 1 sentinel lymph node negative.  Right breast mammoplasty was benign  Oncotype DX testing shows score of 14, her benefit from chemo is less than 1%  February 25, 2022: Patient already seen by radiation oncology with plans to start radiation.  3/9/2022 - 4/5/2022: Status post adjuvant radiation  February 2022: Started endocrine  treatment x 5 years (Feb 2027).  Initially femara, switched to tamoxifen in 2024 March due to arthralgias  August 2024 left diagnostic mammo ultrasound-BI-RADS 2.  It was ordered for evaluation of left breast palpable cystic mass.  October 2024: Patient establish care with me.  States compliance with tamoxifen.  No issues.  April 2025: Tolerating tamoxifen well.  Reviewed results of screening mammogram from December 2024, BI-RADS 2 as well as left breast diagnostic mammogram ultrasound for evaluation of nipple [id number].  She is scheduled for MRI breast on 5/01/2025      *Family history of malignancy:  INVITAE genetic test negative for 47 genes    * History of renal cell cancer s/p left nephrectomy without evidence of recurrence and at age 31    *Bone health  5/5/2022 DEXA scan-within normal limits    Plan  Currently on tamoxifen and tolerating very well  Follow-up in 6 months with labs  Mammogram/MRI breast being ordered by high risk breast clinic

## 2025-04-15 ENCOUNTER — OFFICE VISIT (OUTPATIENT)
Dept: ONCOLOGY | Facility: CLINIC | Age: 65
End: 2025-04-15
Payer: COMMERCIAL

## 2025-04-15 ENCOUNTER — LAB (OUTPATIENT)
Dept: LAB | Facility: HOSPITAL | Age: 65
End: 2025-04-15
Payer: COMMERCIAL

## 2025-04-15 VITALS
HEART RATE: 72 BPM | WEIGHT: 190.6 LBS | BODY MASS INDEX: 29.91 KG/M2 | DIASTOLIC BLOOD PRESSURE: 95 MMHG | TEMPERATURE: 97.9 F | SYSTOLIC BLOOD PRESSURE: 151 MMHG | OXYGEN SATURATION: 98 % | HEIGHT: 67 IN

## 2025-04-15 DIAGNOSIS — Z17.0 MALIGNANT NEOPLASM OF UPPER-INNER QUADRANT OF LEFT BREAST IN FEMALE, ESTROGEN RECEPTOR POSITIVE: Primary | ICD-10-CM

## 2025-04-15 DIAGNOSIS — C50.212 MALIGNANT NEOPLASM OF UPPER-INNER QUADRANT OF LEFT BREAST IN FEMALE, ESTROGEN RECEPTOR POSITIVE: Primary | ICD-10-CM

## 2025-04-15 DIAGNOSIS — C50.212 MALIGNANT NEOPLASM OF UPPER-INNER QUADRANT OF LEFT BREAST IN FEMALE, ESTROGEN RECEPTOR POSITIVE: ICD-10-CM

## 2025-04-15 DIAGNOSIS — Z17.0 MALIGNANT NEOPLASM OF UPPER-INNER QUADRANT OF LEFT BREAST IN FEMALE, ESTROGEN RECEPTOR POSITIVE: ICD-10-CM

## 2025-04-15 DIAGNOSIS — Z79.899 HIGH RISK MEDICATION USE: ICD-10-CM

## 2025-04-15 LAB
ALBUMIN SERPL-MCNC: 4.2 G/DL (ref 3.5–5.2)
ALBUMIN/GLOB SERPL: 1.7 G/DL
ALP SERPL-CCNC: 87 U/L (ref 39–117)
ALT SERPL W P-5'-P-CCNC: 24 U/L (ref 1–33)
ANION GAP SERPL CALCULATED.3IONS-SCNC: 10.9 MMOL/L (ref 5–15)
AST SERPL-CCNC: 20 U/L (ref 1–32)
BASOPHILS # BLD AUTO: 0.02 10*3/MM3 (ref 0–0.2)
BASOPHILS NFR BLD AUTO: 0.3 % (ref 0–1.5)
BILIRUB SERPL-MCNC: 0.4 MG/DL (ref 0–1.2)
BUN SERPL-MCNC: 12 MG/DL (ref 8–23)
BUN/CREAT SERPL: 14.8 (ref 7–25)
CALCIUM SPEC-SCNC: 9.7 MG/DL (ref 8.6–10.5)
CHLORIDE SERPL-SCNC: 104 MMOL/L (ref 98–107)
CO2 SERPL-SCNC: 25.1 MMOL/L (ref 22–29)
CREAT SERPL-MCNC: 0.81 MG/DL (ref 0.57–1)
DEPRECATED RDW RBC AUTO: 43.3 FL (ref 37–54)
EGFRCR SERPLBLD CKD-EPI 2021: 81.7 ML/MIN/1.73
EOSINOPHIL # BLD AUTO: 0.28 10*3/MM3 (ref 0–0.4)
EOSINOPHIL NFR BLD AUTO: 4.7 % (ref 0.3–6.2)
ERYTHROCYTE [DISTWIDTH] IN BLOOD BY AUTOMATED COUNT: 12.6 % (ref 12.3–15.4)
GLOBULIN UR ELPH-MCNC: 2.5 GM/DL
GLUCOSE SERPL-MCNC: 116 MG/DL (ref 65–99)
HCT VFR BLD AUTO: 44.4 % (ref 34–46.6)
HGB BLD-MCNC: 14.5 G/DL (ref 12–15.9)
IMM GRANULOCYTES # BLD AUTO: 0.01 10*3/MM3 (ref 0–0.05)
IMM GRANULOCYTES NFR BLD AUTO: 0.2 % (ref 0–0.5)
LYMPHOCYTES # BLD AUTO: 1.5 10*3/MM3 (ref 0.7–3.1)
LYMPHOCYTES NFR BLD AUTO: 25.3 % (ref 19.6–45.3)
MCH RBC QN AUTO: 30.4 PG (ref 26.6–33)
MCHC RBC AUTO-ENTMCNC: 32.7 G/DL (ref 31.5–35.7)
MCV RBC AUTO: 93.1 FL (ref 79–97)
MONOCYTES # BLD AUTO: 0.32 10*3/MM3 (ref 0.1–0.9)
MONOCYTES NFR BLD AUTO: 5.4 % (ref 5–12)
NEUTROPHILS NFR BLD AUTO: 3.79 10*3/MM3 (ref 1.7–7)
NEUTROPHILS NFR BLD AUTO: 64.1 % (ref 42.7–76)
NRBC BLD AUTO-RTO: 0 /100 WBC (ref 0–0.2)
PLATELET # BLD AUTO: 228 10*3/MM3 (ref 140–450)
PMV BLD AUTO: 10.3 FL (ref 6–12)
POTASSIUM SERPL-SCNC: 4.3 MMOL/L (ref 3.5–5.2)
PROT SERPL-MCNC: 6.7 G/DL (ref 6–8.5)
RBC # BLD AUTO: 4.77 10*6/MM3 (ref 3.77–5.28)
SODIUM SERPL-SCNC: 140 MMOL/L (ref 136–145)
WBC NRBC COR # BLD AUTO: 5.92 10*3/MM3 (ref 3.4–10.8)

## 2025-04-15 PROCEDURE — 99214 OFFICE O/P EST MOD 30 MIN: CPT | Performed by: STUDENT IN AN ORGANIZED HEALTH CARE EDUCATION/TRAINING PROGRAM

## 2025-04-15 PROCEDURE — 36415 COLL VENOUS BLD VENIPUNCTURE: CPT

## 2025-04-15 PROCEDURE — 85025 COMPLETE CBC W/AUTO DIFF WBC: CPT

## 2025-04-15 PROCEDURE — 80053 COMPREHEN METABOLIC PANEL: CPT

## 2025-04-15 RX ORDER — TAMOXIFEN CITRATE 20 MG/1
20 TABLET ORAL DAILY
Qty: 90 TABLET | Refills: 11 | Status: SHIPPED | OUTPATIENT
Start: 2025-04-15

## 2025-05-01 ENCOUNTER — HOSPITAL ENCOUNTER (OUTPATIENT)
Dept: MRI IMAGING | Facility: HOSPITAL | Age: 65
Discharge: HOME | End: 2025-05-01
Admitting: NURSE PRACTITIONER
Payer: COMMERCIAL

## 2025-05-01 DIAGNOSIS — N64.52 NIPPLE DISCHARGE: ICD-10-CM

## 2025-05-01 PROCEDURE — 77049 MRI BREAST C-+ W/CAD BI: CPT

## 2025-05-01 PROCEDURE — A9577 INJ MULTIHANCE: HCPCS | Performed by: NURSE PRACTITIONER

## 2025-05-01 PROCEDURE — 25510000002 GADOBENATE DIMEGLUMINE 529 MG/ML SOLUTION: Performed by: NURSE PRACTITIONER

## 2025-05-01 RX ADMIN — GADOBENATE DIMEGLUMINE 20 ML: 529 INJECTION, SOLUTION INTRAVENOUS at 18:39

## 2025-05-05 ENCOUNTER — RESULTS FOLLOW-UP (OUTPATIENT)
Dept: SURGERY | Facility: CLINIC | Age: 65
End: 2025-05-05
Payer: COMMERCIAL

## 2025-05-05 DIAGNOSIS — R92.8 ABNORMAL FINDINGS ON DIAGNOSTIC IMAGING OF BREAST: Primary | ICD-10-CM

## 2025-05-05 NOTE — TELEPHONE ENCOUNTER
Spoke to pt and let her know we reached out to radiology and they still want to do a left dx mammo and left limited us I have isiah this here at the hospital on 06/10 @ 130   Pt stated understanding

## 2025-05-06 DIAGNOSIS — E11.9 TYPE 2 DIABETES MELLITUS WITHOUT COMPLICATION, WITHOUT LONG-TERM CURRENT USE OF INSULIN: ICD-10-CM

## 2025-05-06 RX ORDER — ROSUVASTATIN CALCIUM 5 MG/1
5 TABLET, COATED ORAL DAILY
Qty: 90 TABLET | Refills: 1 | Status: SHIPPED | OUTPATIENT
Start: 2025-05-06

## 2025-05-06 NOTE — TELEPHONE ENCOUNTER
Rx Refill Note  Requested Prescriptions     Pending Prescriptions Disp Refills    rosuvastatin (Crestor) 5 MG tablet 90 tablet 1     Sig: Take 1 tablet by mouth Daily.      Last office visit with prescribing clinician: 8/1/2024   Last telemedicine visit with prescribing clinician: Visit date not found   Next office visit with prescribing clinician: 5/28/2025                         Would you like a call back once the refill request has been completed: [] Yes [] No    If the office needs to give you a call back, can they leave a voicemail: [] Yes [] No    Smiley Mitchell MA  05/06/25, 15:51 EDT

## 2025-05-30 ENCOUNTER — OFFICE VISIT (OUTPATIENT)
Dept: FAMILY MEDICINE CLINIC | Facility: CLINIC | Age: 65
End: 2025-05-30
Payer: COMMERCIAL

## 2025-05-30 VITALS
SYSTOLIC BLOOD PRESSURE: 131 MMHG | DIASTOLIC BLOOD PRESSURE: 84 MMHG | HEIGHT: 67 IN | WEIGHT: 191 LBS | OXYGEN SATURATION: 97 % | BODY MASS INDEX: 29.98 KG/M2 | HEART RATE: 72 BPM

## 2025-05-30 DIAGNOSIS — E11.9 TYPE 2 DIABETES MELLITUS WITHOUT COMPLICATION, WITHOUT LONG-TERM CURRENT USE OF INSULIN: ICD-10-CM

## 2025-05-30 DIAGNOSIS — I10 PRIMARY HYPERTENSION: ICD-10-CM

## 2025-05-30 DIAGNOSIS — Z79.899 ENCOUNTER FOR LONG-TERM (CURRENT) USE OF MEDICATIONS: ICD-10-CM

## 2025-05-30 DIAGNOSIS — Z00.00 ENCOUNTER FOR WELLNESS EXAMINATION IN ADULT: Primary | ICD-10-CM

## 2025-05-30 DIAGNOSIS — E78.2 MIXED HYPERLIPIDEMIA: ICD-10-CM

## 2025-05-30 DIAGNOSIS — Z13.820 ENCOUNTER FOR OSTEOPOROSIS SCREENING IN ASYMPTOMATIC POSTMENOPAUSAL PATIENT: ICD-10-CM

## 2025-05-30 DIAGNOSIS — E04.2 MULTIPLE THYROID NODULES: ICD-10-CM

## 2025-05-30 DIAGNOSIS — Z78.0 ENCOUNTER FOR OSTEOPOROSIS SCREENING IN ASYMPTOMATIC POSTMENOPAUSAL PATIENT: ICD-10-CM

## 2025-05-30 LAB
EXPIRATION DATE: ABNORMAL
EXPIRATION DATE: NORMAL
HBA1C MFR BLD: 6.2 % (ref 4.5–5.7)
Lab: ABNORMAL
Lab: NORMAL
POC ALBUMIN, URINE: 30 MG/L
POC CREATININE, URINE: 200 MG/DL
POC URINE ALB/CREA RATIO: <30

## 2025-05-30 NOTE — PROGRESS NOTES
Subjective   Daysi Lugo is a 65 y.o. female who presents for annual female wellness exam.  Chief Complaint   Patient presents with    Annual Exam    Diabetes   Patient is also here today to follow-up on her chronic health conditions:    Hypertension.  She is currently taking valsartan 160 mg daily and amlodipine 10 mg daily.  Patient states that she has no side effects. BP are well controlled.      Hyperlipidemia.  The patient is currently on rosuvastatin 5 mg daily.  She is tolerating it well.    Type 2 diabetes.  She was diagnosed by a A1c of 8.0 on 2021.  She is taking metformin 1000mg BID and Ozempic 2 mg weekly. She is tolerating the medication well and denies any side effects.      Thyromegaly.  The patient has had a history of prior thyroid nodules.  She was also started on Synthroid in the past and had an allergic reaction to it so she never went back to the doctor.  Her mother has a history of thyroid cancer, unsure of what type.  Ultrasound performed 2023 showed an enlarged gland with multiple thyroid nodules (triads 3).  No new symptoms.  Yearly surveillance ultrasound is due again in May 2025     Menstrual History:  hysterectomy, DUB  Pregnancy History: G0  Sexual History: Monogamous male partner for the past 3 years  Contraception: Hysterectomy  Hormone Replacement Therapy: NA  Diet: as healthy as possible  Exercise: no routine  Do you feel safe? Yes  Have you ever been abused? No    Mammogram: history of breast Ca, monitored per Onc  Pap Smear: Hysterectomy for DUB  Bone Density: 2022, Normal   Colon Cancer Screenin2023 +polyps.  Recommended repeat in 3 years.  (Maternal GM with Colon CA)    Immunization History   Administered Date(s) Administered    COVID-19 (PFIZER) Purple Cap Monovalent 2021, 2021    Covid-19 (Pfizer) Gray Cap Monovalent 2022    Flu Vaccine Split Quad 10/09/2019, 10/09/2019, 10/07/2020, 10/07/2020    Fluzone (or Fluarix &  Flulaval for VFC) >6mos 10/09/2019, 12/06/2021, 10/18/2023    Influenza MDCK Quadrivalent with Preserative 10/07/2020       The following portions of the patient's history were reviewed and updated as appropriate: allergies, current medications, past family history, past medical history, past social history, past surgical history and problem list.    Past Medical History:   Diagnosis Date    Anesthesia complication     SLOW TO WAKE UP    Arthritis     Breast cancer 2021    Invasive ductal carcinoma of left breast     GERD (gastroesophageal reflux disease)     history    History of kidney cancer     S/P right nephrectomy in 1991    Hypertension     Tumor, thyroid     Type 2 diabetes mellitus        Past Surgical History:   Procedure Laterality Date    APPENDECTOMY      BACK SURGERY  1990    BREAST BIOPSY Right     BREAST LUMPECTOMY WITH SENTINEL NODE BIOPSY Left 01/17/2022    Procedure: Left ultrasound-guided partial mastectomy and sentinel lymph node biopsy;  Surgeon: Beth Delong MD;  Location: [facility];  Service: General;  Laterality: Left;    BREAST RECONSTRUCTION Bilateral 01/17/2022    Procedure: Right breast reduction/mastopexy, left breast oncoplastic closure, bilateral chest liposuction;  Surgeon: Simon Castro MD;  Location: [facility];  Service: Plastics;  Laterality: Bilateral;    BREAST RECONSTRUCTION Bilateral 10/26/2022    Procedure: BREAST RECONSTRUCTION REVISION with skin tailoring, fat graft and induration resection.;  Surgeon: Simon Castro MD;  Location: [facility];  Service: Plastics;  Laterality: Bilateral;    COLONOSCOPY      COLONOSCOPY W/ POLYPECTOMY N/A 9/19/2023    Procedure: COLONOSCOPY WITH POLYPECTOMY;  Surgeon: Sadia Lopez DO;  Location: [facility];  Service: Gastroenterology;  Laterality: N/A;  diverticulosis  right colon polyp x1 (hot snare)  sigmoid polyp x1 (hot snare)    FAT GRAFTING Bilateral 10/26/2022    Procedure: FAT GRAFTING;   Surgeon: Simon Castro MD;  Location: [facility] OR;  Service: Plastics;  Laterality: Bilateral;    HYSTERECTOMY  1992    KNEE SURGERY Right 2001    RECONSTRUCTION    KNEE SURGERY Left 2003    RECONSTRUCTION    LASIK Bilateral     NEPHRECTOMY Right 1991    REDUCTION MAMMAPLASTY  2008    THYROID BIOPSY Bilateral 2010    TONSILLECTOMY         Family History   Problem Relation Age of Onset    Diabetes type II Mother     Hypertension Mother     Thyroid cancer Mother     Breast cancer Maternal Grandmother     Colon cancer Maternal Grandmother     Diabetes Maternal Grandmother     Stroke Maternal Grandmother     Alzheimer's disease Maternal Grandfather     Malig Hyperthermia Neg Hx        Social History     Socioeconomic History    Marital status:      Spouse name: Lucas    Number of children: 0    Highest education level: Bachelor's degree (e.g., BA, AB, BS)   Tobacco Use    Smoking status: Former     Current packs/day: 0.00     Average packs/day: 0.3 packs/day for 21.0 years (5.3 ttl pk-yrs)     Types: Cigarettes     Start date:      Quit date: 1991     Years since quittin.4     Passive exposure: Never    Smokeless tobacco: Never   Vaping Use    Vaping status: Never Used   Substance and Sexual Activity    Alcohol use: Not Currently    Drug use: Never    Sexual activity: Yes     Partners: Male     Birth control/protection: Post-menopausal, None       Review of Systems   Constitutional:  Negative for activity change, appetite change, fatigue and unexpected weight change.   HENT:  Negative for congestion, ear pain, nosebleeds, sore throat and tinnitus.    Eyes:  Negative for pain, redness and visual disturbance.   Respiratory:  Negative for cough, shortness of breath and wheezing.    Cardiovascular:  Negative for chest pain, palpitations and leg swelling.   Gastrointestinal:  Negative for abdominal pain, blood in stool and nausea.   Endocrine: Negative for polydipsia and polyuria.    Genitourinary:  Negative for dysuria, frequency, menstrual problem and vaginal discharge.   Musculoskeletal:  Negative for arthralgias, joint swelling and myalgias.   Skin:  Negative for rash.   Allergic/Immunologic: Negative for environmental allergies, food allergies and immunocompromised state.   Neurological:  Negative for dizziness, speech difficulty, weakness and headaches.   Hematological:  Negative for adenopathy. Does not bruise/bleed easily.   Psychiatric/Behavioral:  Negative for decreased concentration and dysphoric mood. The patient is not nervous/anxious.        Objective   Vitals:    05/30/25 1410   BP: 131/84   Pulse: 72   SpO2: 97%     Body mass index is 29.91 kg/m².  Physical Exam  Vitals and nursing note reviewed.   Constitutional:       Appearance: Normal appearance. She is well-developed. She is obese.   HENT:      Head: Normocephalic and atraumatic.   Eyes:      General: No scleral icterus.     Conjunctiva/sclera: Conjunctivae normal.   Cardiovascular:      Rate and Rhythm: Normal rate and regular rhythm.      Heart sounds: Normal heart sounds.   Pulmonary:      Effort: Pulmonary effort is normal.      Breath sounds: Normal breath sounds.   Musculoskeletal:         General: Normal range of motion.      Cervical back: Normal range of motion and neck supple.      Right lower leg: No edema.      Left lower leg: No edema.   Skin:     General: Skin is warm and dry.      Capillary Refill: Capillary refill takes less than 2 seconds.      Findings: No rash.   Neurological:      Mental Status: She is alert and oriented to person, place, and time.   Psychiatric:         Mood and Affect: Mood normal.         Behavior: Behavior normal.         Thought Content: Thought content normal.         Judgment: Judgment normal.           Assessment & Plan   Diagnoses and all orders for this visit:    1. Encounter for wellness examination in adult (Primary)    2. Type 2 diabetes mellitus without complication,  without long-term current use of insulin  -     POC Glycosylated Hemoglobin (Hb A1C)  -     POC Albumin/Creatinine Ratio Urine    3. Primary hypertension    4. Mixed hyperlipidemia  -     Lipid Panel    5. Multiple thyroid nodules  -     US Thyroid; Future  -     TSH    6. Encounter for long-term (current) use of medications  -     TSH  -     Lipid Panel    7. Encounter for osteoporosis screening in asymptomatic postmenopausal patient  -     DEXA Bone Density Axial; Future      RHM.  DEXA scan order entered.  Patient is up-to-date on all other screenings.  Vaccine recommendations discussed.    Patient is also here to follow-up on chronic stable type 2 diabetes, hypertension, hyperlipidemia, and thyroid nodules.  He is due for a thyroid ultrasound and an order was entered.  Surveillance labs were obtained today and any medication changes will be made based on lab results and will be called to the patient later this week.     Discussed the importance of maintaining a healthy weight and getting regular exercise.  Educated patient on the benefits of healthy diet.  Advise follow-up annually for wellness exams.      There are no Patient Instructions on file for this visit.

## 2025-05-31 LAB
CHOLEST SERPL-MCNC: 104 MG/DL (ref 100–199)
HDLC SERPL-MCNC: 37 MG/DL
LDLC SERPL CALC-MCNC: 37 MG/DL (ref 0–99)
TRIGL SERPL-MCNC: 186 MG/DL (ref 0–149)
TSH SERPL DL<=0.005 MIU/L-ACNC: 2.62 UIU/ML (ref 0.45–4.5)
VLDLC SERPL CALC-MCNC: 30 MG/DL (ref 5–40)

## 2025-06-10 ENCOUNTER — HOSPITAL ENCOUNTER (OUTPATIENT)
Dept: ULTRASOUND IMAGING | Facility: HOSPITAL | Age: 65
Discharge: HOME | End: 2025-06-10
Payer: COMMERCIAL

## 2025-06-10 ENCOUNTER — HOSPITAL ENCOUNTER (OUTPATIENT)
Dept: MAMMOGRAPHY | Facility: HOSPITAL | Age: 65
Discharge: HOME | End: 2025-06-10
Payer: COMMERCIAL

## 2025-06-10 PROCEDURE — 76642 ULTRASOUND BREAST LIMITED: CPT

## 2025-06-12 ENCOUNTER — PREP FOR SURGERY (OUTPATIENT)
Dept: OTHER | Facility: HOSPITAL | Age: 65
End: 2025-06-12
Payer: COMMERCIAL

## 2025-06-12 ENCOUNTER — HOSPITAL ENCOUNTER (OUTPATIENT)
Dept: BONE DENSITY | Facility: HOSPITAL | Age: 65
Discharge: HOME | End: 2025-06-12
Payer: COMMERCIAL

## 2025-06-12 ENCOUNTER — HOSPITAL ENCOUNTER (OUTPATIENT)
Dept: ULTRASOUND IMAGING | Facility: HOSPITAL | Age: 65
Discharge: HOME | End: 2025-06-12
Payer: COMMERCIAL

## 2025-06-12 ENCOUNTER — RESULTS FOLLOW-UP (OUTPATIENT)
Dept: SURGERY | Facility: CLINIC | Age: 65
End: 2025-06-12
Payer: COMMERCIAL

## 2025-06-12 DIAGNOSIS — E04.2 MULTIPLE THYROID NODULES: ICD-10-CM

## 2025-06-12 DIAGNOSIS — R92.8 ABNORMAL FINDINGS ON DIAGNOSTIC IMAGING OF BREAST: Primary | ICD-10-CM

## 2025-06-12 DIAGNOSIS — Z78.0 ENCOUNTER FOR OSTEOPOROSIS SCREENING IN ASYMPTOMATIC POSTMENOPAUSAL PATIENT: ICD-10-CM

## 2025-06-12 DIAGNOSIS — Z13.820 ENCOUNTER FOR OSTEOPOROSIS SCREENING IN ASYMPTOMATIC POSTMENOPAUSAL PATIENT: ICD-10-CM

## 2025-06-12 PROCEDURE — 76536 US EXAM OF HEAD AND NECK: CPT

## 2025-06-12 PROCEDURE — 77080 DXA BONE DENSITY AXIAL: CPT

## 2025-06-12 NOTE — TELEPHONE ENCOUNTER
----- Message from Zane Kramer sent at 6/12/2025  1:42 PM EDT -----  Please let her know that they were able to see the area of concern with ultrasound so we need to set her up for an ultrasound-guided breast biopsy.  Order has been placed  ----- Message -----  From: Interface, Rad Results [employer] In  Sent: 6/10/2025  10:36 PM EDT  To: LYNNE Cook

## 2025-06-12 NOTE — TELEPHONE ENCOUNTER
Spoke to pt and let her know that her bx is isiah here at the hospital on 06/18 @ 230 be here at 130  Pt stated understanding

## 2025-06-18 ENCOUNTER — OFFICE VISIT (OUTPATIENT)
Dept: SPORTS MEDICINE | Facility: CLINIC | Age: 65
End: 2025-06-18
Payer: COMMERCIAL

## 2025-06-18 ENCOUNTER — HOSPITAL ENCOUNTER (OUTPATIENT)
Dept: MAMMOGRAPHY | Facility: HOSPITAL | Age: 65
Discharge: HOME | End: 2025-06-18
Payer: COMMERCIAL

## 2025-06-18 ENCOUNTER — TELEPHONE (OUTPATIENT)
Dept: SPORTS MEDICINE | Facility: CLINIC | Age: 65
End: 2025-06-18

## 2025-06-18 ENCOUNTER — HOSPITAL ENCOUNTER (OUTPATIENT)
Dept: ULTRASOUND IMAGING | Facility: HOSPITAL | Age: 65
Discharge: HOME | End: 2025-06-18
Payer: COMMERCIAL

## 2025-06-18 VITALS
DIASTOLIC BLOOD PRESSURE: 76 MMHG | OXYGEN SATURATION: 97 % | BODY MASS INDEX: 28.72 KG/M2 | SYSTOLIC BLOOD PRESSURE: 124 MMHG | TEMPERATURE: 97.3 F | WEIGHT: 183 LBS | HEIGHT: 67 IN | RESPIRATION RATE: 16 BRPM | HEART RATE: 76 BPM

## 2025-06-18 VITALS
HEART RATE: 78 BPM | WEIGHT: 191 LBS | OXYGEN SATURATION: 99 % | TEMPERATURE: 98.2 F | SYSTOLIC BLOOD PRESSURE: 126 MMHG | HEIGHT: 67 IN | DIASTOLIC BLOOD PRESSURE: 82 MMHG | RESPIRATION RATE: 16 BRPM | BODY MASS INDEX: 29.98 KG/M2

## 2025-06-18 DIAGNOSIS — M51.362 DEGENERATION OF INTERVERTEBRAL DISC OF LUMBAR REGION WITH DISCOGENIC BACK PAIN AND LOWER EXTREMITY PAIN: ICD-10-CM

## 2025-06-18 DIAGNOSIS — M54.50 LUMBAR PAIN: Primary | ICD-10-CM

## 2025-06-18 DIAGNOSIS — M54.50 LUMBAR PAIN: ICD-10-CM

## 2025-06-18 DIAGNOSIS — R92.8 ABNORMAL FINDINGS ON DIAGNOSTIC IMAGING OF BREAST: ICD-10-CM

## 2025-06-18 DIAGNOSIS — M43.16 SPONDYLOLISTHESIS, LUMBAR REGION: ICD-10-CM

## 2025-06-18 PROCEDURE — 88305 TISSUE EXAM BY PATHOLOGIST: CPT | Performed by: NURSE PRACTITIONER

## 2025-06-18 PROCEDURE — 88342 IMHCHEM/IMCYTCHM 1ST ANTB: CPT | Performed by: NURSE PRACTITIONER

## 2025-06-18 PROCEDURE — 88341 IMHCHEM/IMCYTCHM EA ADD ANTB: CPT | Performed by: NURSE PRACTITIONER

## 2025-06-18 PROCEDURE — 25010000002 LIDOCAINE 1% - EPINEPHRINE 1:100000 1 %-1:100000 SOLUTION: Performed by: RADIOLOGY

## 2025-06-18 PROCEDURE — 77065 DX MAMMO INCL CAD UNI: CPT

## 2025-06-18 PROCEDURE — A4648 IMPLANTABLE TISSUE MARKER: HCPCS

## 2025-06-18 PROCEDURE — 25010000002 LIDOCAINE 1 % SOLUTION: Performed by: RADIOLOGY

## 2025-06-18 RX ORDER — CELECOXIB 200 MG/1
200 CAPSULE ORAL DAILY
Qty: 30 CAPSULE | Refills: 0 | Status: SHIPPED | OUTPATIENT
Start: 2025-06-18 | End: 2025-06-18 | Stop reason: SDUPTHER

## 2025-06-18 RX ORDER — LIDOCAINE HYDROCHLORIDE 10 MG/ML
3 INJECTION, SOLUTION INFILTRATION; PERINEURAL ONCE
Status: COMPLETED | OUTPATIENT
Start: 2025-06-18 | End: 2025-06-18

## 2025-06-18 RX ORDER — CELECOXIB 200 MG/1
200 CAPSULE ORAL DAILY
Qty: 30 CAPSULE | Refills: 0 | Status: SHIPPED | OUTPATIENT
Start: 2025-06-18

## 2025-06-18 RX ORDER — LIDOCAINE HYDROCHLORIDE AND EPINEPHRINE 10; 10 MG/ML; UG/ML
10 INJECTION, SOLUTION INFILTRATION; PERINEURAL ONCE
Status: COMPLETED | OUTPATIENT
Start: 2025-06-18 | End: 2025-06-18

## 2025-06-18 RX ADMIN — LIDOCAINE HYDROCHLORIDE,EPINEPHRINE BITARTRATE 6.5 ML: 10; .01 INJECTION, SOLUTION INFILTRATION; PERINEURAL at 14:41

## 2025-06-18 RX ADMIN — LIDOCAINE HYDROCHLORIDE 3 ML: 10 INJECTION, SOLUTION INFILTRATION; PERINEURAL at 14:41

## 2025-06-18 NOTE — TELEPHONE ENCOUNTER
"Provider: DR SALAZAR     Caller: Daysi Lugo \"CD\"    Relationship to Patient: Self    Pharmacy: Pharmacy: [id number] - [address] - [phone]  - [phone] FX     Phone Number: [phone]    Reason for Call: PATIENT STATER HER CELECOXIB 200 MGS WAS SENT TO [facility] WHO WILL ONLY FILL FOR A 90 SUPPLY. PLEASE SEND TO [facility] LISTED ABOVE.     When was the patient last seen: 6-18-25    "

## 2025-06-18 NOTE — NURSING NOTE
Biopsy site to    left  breast clear with exofin dry and intact. No firmness or swelling noted at or around biopsy site. Denies pain. Ice pack with protective covering applied to biopsy site. Discharge instructions discussed with understanding voiced by patient. Copies provided to patient. No distress noted. To home via personal vehicle.

## 2025-06-19 ENCOUNTER — TELEPHONE (OUTPATIENT)
Dept: MAMMOGRAPHY | Facility: HOSPITAL | Age: 65
End: 2025-06-19
Payer: COMMERCIAL

## 2025-06-19 NOTE — TELEPHONE ENCOUNTER
Left VM checking on patient after procedure yesterday. Requested call back with any questions or concerns.

## 2025-06-20 ENCOUNTER — PATIENT ROUNDING (BHMG ONLY) (OUTPATIENT)
Dept: SPORTS MEDICINE | Facility: CLINIC | Age: 65
End: 2025-06-20
Payer: COMMERCIAL

## 2025-06-20 LAB
CYTO UR: NORMAL
LAB AP CASE REPORT: NORMAL
LAB AP CLINICAL INFORMATION: NORMAL
LAB AP SPECIAL STAINS: NORMAL
PATH REPORT.FINAL DX SPEC: NORMAL
PATH REPORT.GROSS SPEC: NORMAL

## 2025-06-20 NOTE — PROGRESS NOTES
June 20, 2025      A [place] Message has been sent to the patient [place] [facility] [place]

## 2025-06-23 ENCOUNTER — TELEPHONE (OUTPATIENT)
Dept: SURGERY | Facility: CLINIC | Age: 65
End: 2025-06-23
Payer: COMMERCIAL

## 2025-06-23 DIAGNOSIS — Z12.31 ENCOUNTER FOR SCREENING MAMMOGRAM FOR MALIGNANT NEOPLASM OF BREAST: Primary | ICD-10-CM

## 2025-06-23 NOTE — TELEPHONE ENCOUNTER
Per mary kay   Spoke to pt and let her know that her bx came back fat necrosis which is completely benign and I isiah her mammo on 01/12/2026 @ 130 and fu with mary kay 01/19/2026 @ 140     She said she is still having nipple leakage I told her we will follow up in clinic over that   I told her if she feels like it is getting worse to give us a call and that mary kay gets back from her trip Monday and if she would like to talk to her then that is fine to   Pt stated understanding

## 2025-07-03 DIAGNOSIS — E11.9 TYPE 2 DIABETES MELLITUS WITHOUT COMPLICATION, WITHOUT LONG-TERM CURRENT USE OF INSULIN: ICD-10-CM

## 2025-07-03 RX ORDER — SEMAGLUTIDE 2.68 MG/ML
2 INJECTION, SOLUTION SUBCUTANEOUS WEEKLY
Qty: 9 ML | Refills: 3 | Status: SHIPPED | OUTPATIENT
Start: 2025-07-03

## 2025-07-03 NOTE — TELEPHONE ENCOUNTER
Rx Refill Note  Requested Prescriptions     Pending Prescriptions Disp Refills    Semaglutide, 2 MG/DOSE, (Ozempic, 2 MG/DOSE,) 8 MG/3ML solution pen-injector 9 mL 3     Sig: Inject 2 mg under the skin into the appropriate area as directed 1 (One) Time Per Week.      Last office visit with prescribing clinician: 5/30/2025   Last telemedicine visit with prescribing clinician: Visit date not found   Next office visit with prescribing clinician: 12/1/2025                         Would you like a call back once the refill request has been completed: [] Yes [] No    If the office needs to give you a call back, can they leave a voicemail: [] Yes [] No    Smiley Chandler MA  07/03/25, 11:13 EDT

## 2025-08-18 ENCOUNTER — OFFICE VISIT (OUTPATIENT)
Dept: SPORTS MEDICINE | Facility: CLINIC | Age: 65
End: 2025-08-18
Payer: COMMERCIAL

## 2025-08-18 VITALS
DIASTOLIC BLOOD PRESSURE: 80 MMHG | WEIGHT: 180 LBS | HEIGHT: 67 IN | SYSTOLIC BLOOD PRESSURE: 120 MMHG | OXYGEN SATURATION: 97 % | HEART RATE: 71 BPM | BODY MASS INDEX: 28.25 KG/M2 | RESPIRATION RATE: 16 BRPM

## 2025-08-18 DIAGNOSIS — M51.362 DEGENERATION OF INTERVERTEBRAL DISC OF LUMBAR REGION WITH DISCOGENIC BACK PAIN AND LOWER EXTREMITY PAIN: ICD-10-CM

## 2025-08-18 DIAGNOSIS — M54.50 LUMBAR PAIN: Primary | ICD-10-CM

## 2025-08-18 DIAGNOSIS — Z98.890 HISTORY OF BACK SURGERY: ICD-10-CM

## 2025-08-18 DIAGNOSIS — M43.16 SPONDYLOLISTHESIS, LUMBAR REGION: ICD-10-CM

## 2025-08-18 PROCEDURE — 99214 OFFICE O/P EST MOD 30 MIN: CPT | Performed by: FAMILY MEDICINE

## 2025-08-18 RX ORDER — PREDNISONE 20 MG/1
TABLET ORAL
Qty: 20 TABLET | Refills: 0 | Status: SHIPPED | OUTPATIENT
Start: 2025-08-18

## (undated) DEVICE — PATIENT RETURN ELECTRODE, SINGLE-USE, CONTACT QUALITY MONITORING, ADULT, WITH 9FT CORD, FOR PATIENTS WEIGING OVER 33LBS. (15KG): Brand: MEGADYNE

## (undated) DEVICE — GOWN,SIRUS,NON REINFRCD,LARGE,SET IN SL: Brand: MEDLINE

## (undated) DEVICE — GLV SURG BIOGEL LTX PF 5 1/2

## (undated) DEVICE — VIAL FORMALIN CAP 10P 40ML

## (undated) DEVICE — CONTAINER,SPECIMEN,OR STERILE,4OZ: Brand: MEDLINE

## (undated) DEVICE — SUT PDS 2/0 SH 27IN Z317H

## (undated) DEVICE — TRAP FLD MINIVAC MEGADYNE 100ML

## (undated) DEVICE — INTENDED FOR TISSUE SEPARATION, AND OTHER PROCEDURES THAT REQUIRE A SHARP SURGICAL BLADE TO PUNCTURE OR CUT.: Brand: BARD-PARKER ® CARBON RIB-BACK BLADES

## (undated) DEVICE — EXTRCT STPL SKIN STRL BX/12

## (undated) DEVICE — STPLR SKIN VISISTAT WD 35CT

## (undated) DEVICE — PK UNIV COMPL 40

## (undated) DEVICE — RUBBERBAND LF STRL PK/2

## (undated) DEVICE — LOU MINOR PROCEDURE: Brand: MEDLINE INDUSTRIES, INC.

## (undated) DEVICE — KT FILL ASEPTIC/TRANSFR TISS/EXP STRL 1P/U

## (undated) DEVICE — KT INK TISS MARGINMARKER STD 6COLOR

## (undated) DEVICE — JACKSON-PRATT 100CC BULB RESERVOIR: Brand: CARDINAL HEALTH

## (undated) DEVICE — ANTIBACTERIAL UNDYED BRAIDED (POLYGLACTIN 910), SYNTHETIC ABSORBABLE SUTURE: Brand: COATED VICRYL

## (undated) DEVICE — SYR LL 3CC

## (undated) DEVICE — APPL CHLORAPREP HI/LITE 26ML ORNG

## (undated) DEVICE — SNAR POLYP CAPTIFLX MICRO OVL 13MM 240CM

## (undated) DEVICE — Device

## (undated) DEVICE — BANDAGE,GAUZE,BULKEE II,4.5"X4.1YD,STRL: Brand: MEDLINE

## (undated) DEVICE — 3M™ STERI-STRIP™ COMPOUND BENZOIN TINCTURE 40 BAGS/CARTON 4 CARTONS/CASE C1544: Brand: 3M™ STERI-STRIP™

## (undated) DEVICE — PROXIMATE RH ROTATING HEAD SKIN STAPLERS (35 WIDE) CONTAINS 35 STAINLESS STEEL STAPLES: Brand: PROXIMATE

## (undated) DEVICE — SUT MNCRYL PLS ANTIB UD 4/0 PS2 18IN

## (undated) DEVICE — KT ORCA ORCAPOD DISP STRL

## (undated) DEVICE — TBG SXN LIPO 3/8IDX5/8IN ODX10FT DISP

## (undated) DEVICE — PENCL E/S ULTRAVAC TELESCP NOSE HOLSTR 10FT

## (undated) DEVICE — SYS FAT GRAFTING REVOLVE SGL

## (undated) DEVICE — ADAPT CLN BIOGUARD AIR/H2O DISP

## (undated) DEVICE — SILICONE GEL BREAST IMPLANT, SMOOTH ROUND, MODERATE PROJECTION, 190 CC
Type: IMPLANTABLE DEVICE | Site: BREAST | Status: NON-FUNCTIONAL
Brand: OPUS SILICONE GEL BREAST IMPLANT
Removed: 2022-01-17

## (undated) DEVICE — GLV SURG SENSICARE PI MIC PF SZ6.5 LF STRL

## (undated) DEVICE — STCKNT IMPERV 9X36IN STRL

## (undated) DEVICE — SYR LL TP 10ML STRL

## (undated) DEVICE — TBG INFILTRATION CB 156IN

## (undated) DEVICE — LINER SURG CANSTR SXN S/RIGD 1500CC

## (undated) DEVICE — TBG PENCL TELESCP MEGADYNE SMOKE EVAC 10FT

## (undated) DEVICE — LEGGINGS, PAIR, 31X48, STERILE: Brand: MEDLINE

## (undated) DEVICE — GLV SURG SENSICARE POLYISPRN W/ALOE PF LF 6.5 GRN STRL

## (undated) DEVICE — SUT GUT CHRM 5/0 P3 18IN 687G

## (undated) DEVICE — 3M™ STERI-STRIP™ REINFORCED ADHESIVE SKIN CLOSURES, R1547, 1/2 IN X 4 IN (12 MM X 100 MM), 6 STRIPS/ENVELOPE: Brand: 3M™ STERI-STRIP™

## (undated) DEVICE — BW-412T DISP COMBO CLEANING BRUSH: Brand: SINGLE USE COMBINATION CLEANING BRUSH

## (undated) DEVICE — LINER CANSTR SXN QUICKFIT 3000CC

## (undated) DEVICE — CVR TRANSD CIV FLX TPR 11.9 TO 3.8X61CM

## (undated) DEVICE — SYRINGE, LUER LOCK, 60ML: Brand: MEDLINE

## (undated) DEVICE — THE SINGLE USE ETRAP – POLYP TRAP IS USED FOR SUCTION RETRIEVAL OF ENDOSCOPICALLY REMOVED POLYPS.: Brand: ETRAP

## (undated) DEVICE — NDL HYPO PRECISIONGLIDE REG 25G 1 1/2

## (undated) DEVICE — SPNG LAP 18X18IN LF STRL PK/5

## (undated) DEVICE — SOL IRR H2O BTL 1000ML STRL

## (undated) DEVICE — ELECTRD BLD EZ CLN MOD XLNG 2.75IN

## (undated) DEVICE — SYR CATH/TIP 50ML 2OZ STRL 1P/U

## (undated) DEVICE — SUT ETHLN 3/0 PS1 18IN 1663H

## (undated) DEVICE — TOTAL TRAY, 16FR 10ML SIL FOLEY, URN: Brand: MEDLINE

## (undated) DEVICE — GLV SURG SENSICARE PI MIC PF SZ5.5 LF STRL

## (undated) DEVICE — SPNG GZ WOVN 4X4IN 12PLY 10/BX STRL

## (undated) DEVICE — SYR CONTRL PRESS/LO FIX/M/LL W/THMB/RNG 10ML

## (undated) DEVICE — SYRINGE,TOOMEY,IRRIGATION,70CC,STERILE: Brand: MEDLINE

## (undated) DEVICE — STRIP,CLOSURE,WOUND,MEDI-STRIP,1/2X4: Brand: MEDLINE